# Patient Record
Sex: FEMALE | Race: BLACK OR AFRICAN AMERICAN | Employment: FULL TIME | URBAN - NONMETROPOLITAN AREA
[De-identification: names, ages, dates, MRNs, and addresses within clinical notes are randomized per-mention and may not be internally consistent; named-entity substitution may affect disease eponyms.]

---

## 2017-06-23 ENCOUNTER — OFFICE VISIT (OUTPATIENT)
Dept: OBGYN | Age: 22
End: 2017-06-23
Payer: MEDICAID

## 2017-06-23 VITALS
TEMPERATURE: 98.6 F | WEIGHT: 246 LBS | DIASTOLIC BLOOD PRESSURE: 84 MMHG | HEART RATE: 98 BPM | SYSTOLIC BLOOD PRESSURE: 132 MMHG | HEIGHT: 65 IN | BODY MASS INDEX: 40.98 KG/M2

## 2017-06-23 DIAGNOSIS — Z12.4 SCREENING FOR CERVICAL CANCER: ICD-10-CM

## 2017-06-23 DIAGNOSIS — Z01.419 WOMEN'S ANNUAL ROUTINE GYNECOLOGICAL EXAMINATION: Primary | ICD-10-CM

## 2017-06-23 DIAGNOSIS — Z76.89 ESTABLISHING CARE WITH NEW DOCTOR, ENCOUNTER FOR: ICD-10-CM

## 2017-06-23 DIAGNOSIS — E03.8 OTHER SPECIFIED HYPOTHYROIDISM: ICD-10-CM

## 2017-06-23 DIAGNOSIS — D35.2 PITUITARY MICROADENOMA (HCC): ICD-10-CM

## 2017-06-23 PROCEDURE — 99395 PREV VISIT EST AGE 18-39: CPT | Performed by: OBSTETRICS & GYNECOLOGY

## 2017-06-23 ASSESSMENT — ENCOUNTER SYMPTOMS
ALLERGIC/IMMUNOLOGIC NEGATIVE: 1
RESPIRATORY NEGATIVE: 1
EYES NEGATIVE: 1
GASTROINTESTINAL NEGATIVE: 1

## 2017-08-26 ENCOUNTER — HOSPITAL ENCOUNTER (EMERGENCY)
Age: 22
Discharge: HOME | End: 2017-08-27
Payer: MEDICAID

## 2017-08-26 DIAGNOSIS — M79.605 LEG PAIN, ANTERIOR, LEFT: ICD-10-CM

## 2017-08-26 DIAGNOSIS — N39.0 URINARY TRACT INFECTION WITHOUT HEMATURIA, SITE UNSPECIFIED: ICD-10-CM

## 2017-08-26 DIAGNOSIS — Z20.2 EXPOSURE TO STD: Primary | ICD-10-CM

## 2017-08-26 LAB
BACTERIA: ABNORMAL /HPF
BILIRUBIN URINE: NEGATIVE
BLOOD, URINE: ABNORMAL
CASTS: ABNORMAL /LPF
CLARITY: ABNORMAL
COLOR: YELLOW
EPITHELIAL CELLS, UA: ABNORMAL /HPF
GLUCOSE URINE: NEGATIVE MG/DL
HCG(URINE) PREGNANCY TEST: NEGATIVE
KETONES, URINE: ABNORMAL MG/DL
LEUKOCYTE ESTERASE, URINE: ABNORMAL
NITRITE, URINE: NEGATIVE
PH UA: 6
PROTEIN UA: ABNORMAL MG/DL
RBC UA: ABNORMAL /HPF (ref 0–2)
SPECIFIC GRAVITY UA: 1.03
UROBILINOGEN, URINE: 1 E.U./DL
WBC UA: ABNORMAL /HPF (ref 0–5)

## 2017-08-26 PROCEDURE — 99283 EMERGENCY DEPT VISIT LOW MDM: CPT

## 2017-08-26 PROCEDURE — 6360000002 HC RX W HCPCS: Performed by: NURSE PRACTITIONER

## 2017-08-26 PROCEDURE — 96372 THER/PROPH/DIAG INJ SC/IM: CPT

## 2017-08-26 PROCEDURE — 86403 PARTICLE AGGLUT ANTBDY SCRN: CPT

## 2017-08-26 PROCEDURE — 6370000000 HC RX 637 (ALT 250 FOR IP): Performed by: NURSE PRACTITIONER

## 2017-08-26 PROCEDURE — 87591 N.GONORRHOEAE DNA AMP PROB: CPT

## 2017-08-26 PROCEDURE — 87491 CHLMYD TRACH DNA AMP PROBE: CPT

## 2017-08-26 PROCEDURE — 81025 URINE PREGNANCY TEST: CPT

## 2017-08-26 PROCEDURE — 81001 URINALYSIS AUTO W/SCOPE: CPT

## 2017-08-26 PROCEDURE — 87086 URINE CULTURE/COLONY COUNT: CPT

## 2017-08-26 PROCEDURE — 87185 SC STD ENZYME DETCJ PER NZM: CPT

## 2017-08-26 RX ORDER — CEFTRIAXONE 1 G/1
500 INJECTION, POWDER, FOR SOLUTION INTRAMUSCULAR; INTRAVENOUS ONCE
Status: COMPLETED | OUTPATIENT
Start: 2017-08-26 | End: 2017-08-26

## 2017-08-26 RX ORDER — NAPROXEN 500 MG/1
500 TABLET ORAL 2 TIMES DAILY
Qty: 60 TABLET | Refills: 0 | Status: SHIPPED | OUTPATIENT
Start: 2017-08-26 | End: 2018-02-05

## 2017-08-26 RX ORDER — METRONIDAZOLE 500 MG/1
2000 TABLET ORAL ONCE
Status: COMPLETED | OUTPATIENT
Start: 2017-08-26 | End: 2017-08-26

## 2017-08-26 RX ORDER — AZITHROMYCIN 250 MG/1
1000 TABLET, FILM COATED ORAL ONCE
Status: COMPLETED | OUTPATIENT
Start: 2017-08-26 | End: 2017-08-26

## 2017-08-26 RX ADMIN — METRONIDAZOLE 2000 MG: 500 TABLET ORAL at 23:31

## 2017-08-26 RX ADMIN — AZITHROMYCIN 1000 MG: 250 TABLET, FILM COATED ORAL at 23:31

## 2017-08-26 RX ADMIN — CEFTRIAXONE 500 MG: 1 INJECTION, POWDER, FOR SOLUTION INTRAMUSCULAR; INTRAVENOUS at 23:31

## 2017-08-27 VITALS
TEMPERATURE: 98.5 F | SYSTOLIC BLOOD PRESSURE: 125 MMHG | HEIGHT: 64 IN | HEART RATE: 84 BPM | DIASTOLIC BLOOD PRESSURE: 77 MMHG | WEIGHT: 250 LBS | RESPIRATION RATE: 17 BRPM | OXYGEN SATURATION: 99 % | BODY MASS INDEX: 42.68 KG/M2

## 2017-08-27 PROCEDURE — 99283 EMERGENCY DEPT VISIT LOW MDM: CPT | Performed by: NURSE PRACTITIONER

## 2017-08-27 RX ORDER — SULFAMETHOXAZOLE AND TRIMETHOPRIM 800; 160 MG/1; MG/1
1 TABLET ORAL 2 TIMES DAILY
Qty: 10 TABLET | Refills: 0 | Status: SHIPPED | OUTPATIENT
Start: 2017-08-27 | End: 2017-09-01

## 2017-08-27 ASSESSMENT — ENCOUNTER SYMPTOMS
NAUSEA: 0
ABDOMINAL PAIN: 0
VOMITING: 0

## 2017-08-29 LAB
APTIMA MEDIA TYPE: NORMAL
CHLAMYDIA TRACHOMATIS AMPLIFIED DET: NEGATIVE
N GONORRHOEAE AMPLIFIED DET: NEGATIVE
ORGANISM: ABNORMAL
SPECIMEN SOURCE: NORMAL
URINE CULTURE, ROUTINE: ABNORMAL
URINE CULTURE, ROUTINE: ABNORMAL

## 2017-09-06 ENCOUNTER — TELEPHONE (OUTPATIENT)
Dept: OBGYN | Age: 22
End: 2017-09-06

## 2018-02-05 ENCOUNTER — OFFICE VISIT (OUTPATIENT)
Dept: OBGYN | Age: 23
End: 2018-02-05
Payer: MEDICAID

## 2018-02-05 VITALS
WEIGHT: 206 LBS | HEIGHT: 63 IN | BODY MASS INDEX: 36.5 KG/M2 | DIASTOLIC BLOOD PRESSURE: 84 MMHG | SYSTOLIC BLOOD PRESSURE: 130 MMHG

## 2018-02-05 DIAGNOSIS — N91.2 AMENORRHEA: ICD-10-CM

## 2018-02-05 DIAGNOSIS — Z32.01 POSITIVE URINE PREGNANCY TEST: Primary | ICD-10-CM

## 2018-02-05 LAB
CONTROL: ABNORMAL
PREGNANCY TEST URINE, POC: ABNORMAL

## 2018-02-05 PROCEDURE — 81025 URINE PREGNANCY TEST: CPT | Performed by: OBSTETRICS & GYNECOLOGY

## 2018-02-05 PROCEDURE — 99213 OFFICE O/P EST LOW 20 MIN: CPT | Performed by: OBSTETRICS & GYNECOLOGY

## 2018-02-05 ASSESSMENT — ENCOUNTER SYMPTOMS
ALLERGIC/IMMUNOLOGIC NEGATIVE: 1
GASTROINTESTINAL NEGATIVE: 1
EYES NEGATIVE: 1
RESPIRATORY NEGATIVE: 1

## 2018-02-05 NOTE — PROGRESS NOTES
Subjective:      Patient ID: Kimo Thorne is a 23 y.o. female. Kimo Thorne is here today for confirmation of pregnancy. She had a positive home pregnancy test.  She is A0. Her LMP was 18. According to her LMP, she is 5 W 0 D. HPI   Pt here for pregnancy confirmation. She has no other complaints. Previously on Bromocriptine for pituitary microadenoma. She self d/c'd after having menstrual regulation. Review of Systems   Constitutional: Negative. HENT: Negative. Eyes: Negative. Respiratory: Negative. Cardiovascular: Negative. Gastrointestinal: Negative. Endocrine: Negative. Genitourinary: Positive for menstrual problem (amenorrhea due to pregnancy). Musculoskeletal: Negative. Skin: Negative. Allergic/Immunologic: Negative. Neurological: Negative. Hematological: Negative. Psychiatric/Behavioral: Negative. Kimo Thorne is a 23 y.o. female with the following history as recorded in [facility]: There are no active problems to display for this patient. No current outpatient prescriptions on file. No current facility-administered medications for this visit. Allergies: Review of patient's allergies indicates no known allergies. Past Medical History:   Diagnosis Date    Pituitary microadenoma [facility])     Pituitary tumor      History reviewed. No pertinent surgical history. Family History   Problem Relation Age of Onset    Hypertension Mother     Cancer Maternal Grandmother      cervical     Social History   Substance Use Topics    Smoking status: Former Smoker    Smokeless tobacco: Never Used    Alcohol use No         /84   Ht 5' 3\" (1.6 m)   Wt 206 lb (93.4 kg)   LMP 2018 (Exact Date)   BMI 36.49 kg/m²   Objective:   Physical Exam   Constitutional: She is oriented to person, place, and time. She appears well-developed and well-nourished. No distress.    HENT:   Head: Normocephalic and

## 2018-02-10 ENCOUNTER — HOSPITAL ENCOUNTER (EMERGENCY)
Facility: HOSPITAL | Age: 23
Discharge: HOME | End: 2018-02-10
Attending: EMERGENCY MEDICINE | Admitting: EMERGENCY MEDICINE

## 2018-02-10 ENCOUNTER — APPOINTMENT (OUTPATIENT)
Dept: ULTRASOUND IMAGING | Facility: HOSPITAL | Age: 23
End: 2018-02-10

## 2018-02-10 VITALS
HEART RATE: 79 BPM | DIASTOLIC BLOOD PRESSURE: 74 MMHG | WEIGHT: 179 LBS | BODY MASS INDEX: 31.71 KG/M2 | OXYGEN SATURATION: 98 % | SYSTOLIC BLOOD PRESSURE: 122 MMHG | HEIGHT: 63 IN | RESPIRATION RATE: 14 BRPM | TEMPERATURE: 98.8 F

## 2018-02-10 DIAGNOSIS — R10.9 ABDOMINAL PAIN DURING PREGNANCY IN FIRST TRIMESTER: Primary | ICD-10-CM

## 2018-02-10 DIAGNOSIS — O26.891 ABDOMINAL PAIN DURING PREGNANCY IN FIRST TRIMESTER: Primary | ICD-10-CM

## 2018-02-10 LAB
ALBUMIN SERPL-MCNC: 4.1 G/DL (ref 3.5–5)
ALBUMIN/GLOB SERPL: 1.3 G/DL (ref 1.1–2.5)
ALP SERPL-CCNC: 69 U/L (ref 24–120)
ALT SERPL W P-5'-P-CCNC: 39 U/L (ref 0–54)
ANION GAP SERPL CALCULATED.3IONS-SCNC: 12 MMOL/L (ref 4–13)
AST SERPL-CCNC: 30 U/L (ref 7–45)
B-HCG UR QL: POSITIVE
BASOPHILS # BLD AUTO: 0.06 10*3/MM3 (ref 0–0.2)
BASOPHILS NFR BLD AUTO: 0.5 % (ref 0–2)
BILIRUB SERPL-MCNC: 0.4 MG/DL (ref 0.1–1)
BILIRUB UR QL STRIP: NEGATIVE
BUN BLD-MCNC: 15 MG/DL (ref 5–21)
BUN/CREAT SERPL: 16.5 (ref 7–25)
CALCIUM SPEC-SCNC: 9.1 MG/DL (ref 8.4–10.4)
CHLORIDE SERPL-SCNC: 105 MMOL/L (ref 98–110)
CLARITY UR: CLEAR
CO2 SERPL-SCNC: 24 MMOL/L (ref 24–31)
COLOR UR: YELLOW
CREAT BLD-MCNC: 0.91 MG/DL (ref 0.5–1.4)
DEPRECATED RDW RBC AUTO: 41.8 FL (ref 40–54)
EOSINOPHIL # BLD AUTO: 0.09 10*3/MM3 (ref 0–0.7)
EOSINOPHIL NFR BLD AUTO: 0.8 % (ref 0–4)
ERYTHROCYTE [DISTWIDTH] IN BLOOD BY AUTOMATED COUNT: 12.5 % (ref 12–15)
GFR SERPL CREATININE-BSD FRML MDRD: 92 ML/MIN/1.73
GLOBULIN UR ELPH-MCNC: 3.2 GM/DL
GLUCOSE BLD-MCNC: 74 MG/DL (ref 70–100)
GLUCOSE UR STRIP-MCNC: NEGATIVE MG/DL
HCG INTACT+B SERPL-ACNC: 8854.6 MIU/ML (ref 0–5)
HCT VFR BLD AUTO: 36.1 % (ref 37–47)
HGB BLD-MCNC: 12.3 G/DL (ref 12–16)
HGB UR QL STRIP.AUTO: NEGATIVE
HOLD SPECIMEN: NORMAL
HOLD SPECIMEN: NORMAL
IMM GRANULOCYTES # BLD: 0.09 10*3/MM3 (ref 0–0.03)
IMM GRANULOCYTES NFR BLD: 0.8 % (ref 0–5)
KETONES UR QL STRIP: NEGATIVE
LEUKOCYTE ESTERASE UR QL STRIP.AUTO: NEGATIVE
LIPASE SERPL-CCNC: 70 U/L (ref 23–203)
LYMPHOCYTES # BLD AUTO: 3.73 10*3/MM3 (ref 0.72–4.86)
LYMPHOCYTES NFR BLD AUTO: 31.7 % (ref 15–45)
MCH RBC QN AUTO: 30.7 PG (ref 28–32)
MCHC RBC AUTO-ENTMCNC: 34.1 G/DL (ref 33–36)
MCV RBC AUTO: 90 FL (ref 82–98)
MONOCYTES # BLD AUTO: 0.9 10*3/MM3 (ref 0.19–1.3)
MONOCYTES NFR BLD AUTO: 7.7 % (ref 4–12)
NEUTROPHILS # BLD AUTO: 6.89 10*3/MM3 (ref 1.87–8.4)
NEUTROPHILS NFR BLD AUTO: 58.5 % (ref 39–78)
NITRITE UR QL STRIP: NEGATIVE
NRBC BLD MANUAL-RTO: 0 /100 WBC (ref 0–0)
PH UR STRIP.AUTO: <=5 [PH] (ref 5–8)
PLATELET # BLD AUTO: 284 10*3/MM3 (ref 130–400)
PMV BLD AUTO: 10.3 FL (ref 6–12)
POTASSIUM BLD-SCNC: 3.6 MMOL/L (ref 3.5–5.3)
PROT SERPL-MCNC: 7.3 G/DL (ref 6.3–8.7)
PROT UR QL STRIP: NEGATIVE
RBC # BLD AUTO: 4.01 10*6/MM3 (ref 4.2–5.4)
SODIUM BLD-SCNC: 141 MMOL/L (ref 135–145)
SP GR UR STRIP: 1.03 (ref 1–1.03)
UROBILINOGEN UR QL STRIP: NORMAL
WBC NRBC COR # BLD: 11.76 10*3/MM3 (ref 4.8–10.8)
WHOLE BLOOD HOLD SPECIMEN: NORMAL
WHOLE BLOOD HOLD SPECIMEN: NORMAL

## 2018-02-10 PROCEDURE — 83690 ASSAY OF LIPASE: CPT | Performed by: EMERGENCY MEDICINE

## 2018-02-10 PROCEDURE — 76817 TRANSVAGINAL US OBSTETRIC: CPT

## 2018-02-10 PROCEDURE — 81003 URINALYSIS AUTO W/O SCOPE: CPT | Performed by: EMERGENCY MEDICINE

## 2018-02-10 PROCEDURE — 81025 URINE PREGNANCY TEST: CPT | Performed by: EMERGENCY MEDICINE

## 2018-02-10 PROCEDURE — 99283 EMERGENCY DEPT VISIT LOW MDM: CPT

## 2018-02-10 PROCEDURE — 84702 CHORIONIC GONADOTROPIN TEST: CPT | Performed by: EMERGENCY MEDICINE

## 2018-02-10 PROCEDURE — 80053 COMPREHEN METABOLIC PANEL: CPT | Performed by: EMERGENCY MEDICINE

## 2018-02-10 PROCEDURE — 85025 COMPLETE CBC W/AUTO DIFF WBC: CPT | Performed by: EMERGENCY MEDICINE

## 2018-02-10 RX ORDER — PRENATAL VIT NO.126/IRON/FOLIC 28MG-0.8MG
TABLET ORAL DAILY
COMMUNITY
End: 2021-11-16

## 2018-02-10 NOTE — ED PROVIDER NOTES
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 Denies vaginal bleeding, discharge, falls, trauma, urinary issues, fever, chills, flank or back pain, cp, sob, nausea, vomiting, diarrhea, medication changes or other issues. She arrives in NAD.     Patient is a 22 y.o. female presenting with abdominal pain.   Abdominal Pain   Pain location:  Suprapubic  Pain quality: cramping    Pain radiates to:  Does not radiate  Pain severity:  Mild  Timing:  Intermittent  Relieved by:  Nothing  Worsened by:  Nothing  Ineffective treatments:  None tried  Associated symptoms: no cough, no dysuria, no hematuria, no nausea, no shortness of breath, no vaginal bleeding, no vaginal discharge and no vomiting        Review of Systems   Respiratory: Negative for cough and shortness of breath.    Gastrointestinal: Positive for abdominal pain. Negative for nausea and vomiting.   Genitourinary: Positive for pelvic pain. Negative for dysuria, flank pain, hematuria, urgency, vaginal bleeding and vaginal discharge.   Musculoskeletal: Negative for back pain.   Skin: Negative for rash.   All other systems reviewed and are negative.      Past Medical History:   Diagnosis Date   • Amenorrhea    • Benign neoplasm of pituitary gland    • Central hypothyroidism    • Galactorrhea not associated with childbirth     Galactorrhea due to non-obstetric cause      • Hyperprolactinemia    • Prolactinoma        No Known Allergies    History reviewed. No pertinent surgical history.    History reviewed. No pertinent family history.    Social History     Social History   • Marital status: Single     Spouse name: N/A   • Number of children: N/A   • Years of education: N/A     Social History Main Topics   • Smoking status: Never Smoker   • Smokeless tobacco: None   • Alcohol use No    • Drug use: No   • Sexual activity: Defer     Other Topics Concern   • None     Social History Narrative   • None           Objective   Physical Exam   Constitutional: She is oriented to person, place, and time. She appears well-developed.   HENT:   Head: Normocephalic.   Nose: Nose normal.   Eyes: Conjunctivae are normal. Pupils are equal, round, and reactive to light.   Neck: Normal range of motion. Neck supple.   Cardiovascular: Normal rate, regular rhythm, normal heart sounds and intact distal pulses.    Pulmonary/Chest: Effort normal and breath sounds normal.   Abdominal: Soft. Bowel sounds are normal.   Soft, non tender   Musculoskeletal: Normal range of motion.   Neurological: She is alert and oriented to person, place, and time.   Skin: Skin is warm.   Psychiatric: She has a normal mood and affect.   Vitals reviewed.      Procedures         ED Course  ED Course        US Ob Transvaginal    (Results Pending)     Labs Reviewed   PREGNANCY, URINE - Abnormal; Notable for the following:        Result Value    HCG, Urine QL Positive (*)     All other components within normal limits   HCG, QUANTITATIVE, PREGNANCY - Abnormal; Notable for the following:     HCG Quantitative 8854.60 (*)     All other components within normal limits   CBC WITH AUTO DIFFERENTIAL - Abnormal; Notable for the following:     WBC 11.76 (*)     RBC 4.01 (*)     Hematocrit 36.1 (*)     Immature Grans, Absolute 0.09 (*)     All other components within normal limits   LIPASE - Normal   URINALYSIS W/ CULTURE IF INDICATED - Normal    Narrative:     Urine microscopic not indicated.   COMPREHENSIVE METABOLIC PANEL   RAINBOW DRAW    Narrative:     The following orders were created for panel order [place] Draw.  Procedure                               Abnormality         Status                     ---------                               -----------         ------                     Light Blue Top[[id number]]                                   Final  "result               Green Top (Gel)[[id number]]                                  Final result               Lavender Top[[id number]]                                     Final result               Red Top[[id number]]                                          Final result                 Please view results for these tests on the individual orders.   CBC AND DIFFERENTIAL    Narrative:     The following orders were created for panel order CBC & Differential.  Procedure                               Abnormality         Status                     ---------                               -----------         ------                     CBC Auto Differential[[id number]]        Abnormal            Final result                 Please view results for these tests on the individual orders.   LIGHT BLUE TOP   GREEN TOP   LAVENDER TOP   RED TOP         Patient refuses pelvic at this time. She shares with me that she has been working \"a lot\" and admits to decreased po intake secondary to working \"so much.\" I explained that on her US we did not yet see all the parts of her developing fetus and that it is important that she actually follow up to have further evaluation to assure her child was developing normally. She has no pain at this time stating \"it got better when I rested.\" At this time, labs unrevealing, patient refuses pelvic but denies bleeding, asking for dc.       US: shows Intrauterine gest. Sac, yolk sac but no fetal pole as of yet.     First bp is seen, will have repeat, if still high will call OB.         MDM    Final diagnoses:   Abdominal pain during pregnancy in first trimester            Ramses Haile MD  02/10/18 0510    "

## 2018-02-10 NOTE — DISCHARGE INSTRUCTIONS
Brenshea,    Your ultrasound, as we talked about, shows some of the normal aspects of your developing child, however, not all are seen as of yet. This can be because you are too early along in your pregnancy to see these structures. Given this, it is important that you 1) see your OB for further evaluation and repeat ultrasounds, 2) return for worsening symptoms, worsening pain, vaginal bleeding or any other issues and 3) increase your fluid hydration at home until your urine is clear to pale yellow and you are urinating every 3-4 hours.

## 2018-02-13 LAB
EXTERNAL ABO GROUPING: NORMAL
EXTERNAL ANTIBODY SCREEN: NEGATIVE
EXTERNAL GROUP B STREP ANTIGEN: POSITIVE
EXTERNAL HEPATITIS B SURFACE ANTIGEN: NEGATIVE
EXTERNAL HEPATITIS C AB: 0.2
EXTERNAL HERPES CULTURE: NORMAL
EXTERNAL RH FACTOR: POSITIVE
EXTERNAL RUBELLA QUALITATIVE: NORMAL
EXTERNAL SYPHILIS RPR SCREEN: NEGATIVE

## 2018-03-27 ENCOUNTER — HOSPITAL ENCOUNTER (EMERGENCY)
Facility: HOSPITAL | Age: 23
Discharge: HOME | End: 2018-03-27
Admitting: EMERGENCY MEDICINE

## 2018-03-27 VITALS
TEMPERATURE: 98.5 F | DIASTOLIC BLOOD PRESSURE: 76 MMHG | HEIGHT: 64 IN | HEART RATE: 94 BPM | SYSTOLIC BLOOD PRESSURE: 125 MMHG | WEIGHT: 215 LBS | BODY MASS INDEX: 36.7 KG/M2 | OXYGEN SATURATION: 100 % | RESPIRATION RATE: 16 BRPM

## 2018-03-27 DIAGNOSIS — R10.9 ABDOMINAL PAIN DURING PREGNANCY IN FIRST TRIMESTER: ICD-10-CM

## 2018-03-27 DIAGNOSIS — N30.90 CYSTITIS: Primary | ICD-10-CM

## 2018-03-27 DIAGNOSIS — O26.891 ABDOMINAL PAIN DURING PREGNANCY IN FIRST TRIMESTER: ICD-10-CM

## 2018-03-27 DIAGNOSIS — Z20.2 POTENTIAL EXPOSURE TO STD: ICD-10-CM

## 2018-03-27 LAB
ALBUMIN SERPL-MCNC: 4 G/DL (ref 3.5–5)
ALBUMIN/GLOB SERPL: 1.2 G/DL (ref 1.1–2.5)
ALP SERPL-CCNC: 43 U/L (ref 24–120)
ALT SERPL W P-5'-P-CCNC: 24 U/L (ref 0–54)
ANION GAP SERPL CALCULATED.3IONS-SCNC: 12 MMOL/L (ref 4–13)
AST SERPL-CCNC: 20 U/L (ref 7–45)
BACTERIA UR QL AUTO: ABNORMAL /HPF
BASOPHILS # BLD AUTO: 0.05 10*3/MM3 (ref 0–0.2)
BASOPHILS NFR BLD AUTO: 0.3 % (ref 0–2)
BILIRUB SERPL-MCNC: 0.4 MG/DL (ref 0.1–1)
BILIRUB UR QL STRIP: NEGATIVE
BUN BLD-MCNC: 11 MG/DL (ref 5–21)
BUN/CREAT SERPL: 16.2 (ref 7–25)
CALCIUM SPEC-SCNC: 9.8 MG/DL (ref 8.4–10.4)
CHLORIDE SERPL-SCNC: 105 MMOL/L (ref 98–110)
CLARITY UR: ABNORMAL
CLUE CELLS SPEC QL WET PREP: ABNORMAL
CO2 SERPL-SCNC: 24 MMOL/L (ref 24–31)
COLOR UR: YELLOW
CREAT BLD-MCNC: 0.68 MG/DL (ref 0.5–1.4)
DEPRECATED RDW RBC AUTO: 39.8 FL (ref 40–54)
EOSINOPHIL # BLD AUTO: 0.02 10*3/MM3 (ref 0–0.7)
EOSINOPHIL NFR BLD AUTO: 0.1 % (ref 0–4)
ERYTHROCYTE [DISTWIDTH] IN BLOOD BY AUTOMATED COUNT: 12 % (ref 12–15)
GFR SERPL CREATININE-BSD FRML MDRD: 129 ML/MIN/1.73
GLOBULIN UR ELPH-MCNC: 3.3 GM/DL
GLUCOSE BLD-MCNC: 89 MG/DL (ref 70–100)
GLUCOSE UR STRIP-MCNC: NEGATIVE MG/DL
HCT VFR BLD AUTO: 37.2 % (ref 37–47)
HGB BLD-MCNC: 12.5 G/DL (ref 12–16)
HGB UR QL STRIP.AUTO: NEGATIVE
HYALINE CASTS UR QL AUTO: ABNORMAL /LPF
HYDATID CYST SPEC WET PREP: ABNORMAL
IMM GRANULOCYTES # BLD: 0.08 10*3/MM3 (ref 0–0.03)
IMM GRANULOCYTES NFR BLD: 0.5 % (ref 0–5)
KETONES UR QL STRIP: ABNORMAL
LEUKOCYTE ESTERASE UR QL STRIP.AUTO: ABNORMAL
LIPASE SERPL-CCNC: 39 U/L (ref 23–203)
LYMPHOCYTES # BLD AUTO: 3.48 10*3/MM3 (ref 0.72–4.86)
LYMPHOCYTES NFR BLD AUTO: 23.5 % (ref 15–45)
MCH RBC QN AUTO: 30.5 PG (ref 28–32)
MCHC RBC AUTO-ENTMCNC: 33.6 G/DL (ref 33–36)
MCV RBC AUTO: 90.7 FL (ref 82–98)
MONOCYTES # BLD AUTO: 0.86 10*3/MM3 (ref 0.19–1.3)
MONOCYTES NFR BLD AUTO: 5.8 % (ref 4–12)
NEUTROPHILS # BLD AUTO: 10.34 10*3/MM3 (ref 1.87–8.4)
NEUTROPHILS NFR BLD AUTO: 69.8 % (ref 39–78)
NITRITE UR QL STRIP: NEGATIVE
NRBC BLD MANUAL-RTO: 0 /100 WBC (ref 0–0)
PH UR STRIP.AUTO: 5.5 [PH] (ref 5–8)
PLATELET # BLD AUTO: 277 10*3/MM3 (ref 130–400)
PMV BLD AUTO: 10 FL (ref 6–12)
POTASSIUM BLD-SCNC: 3.9 MMOL/L (ref 3.5–5.3)
PROT SERPL-MCNC: 7.3 G/DL (ref 6.3–8.7)
PROT UR QL STRIP: ABNORMAL
RBC # BLD AUTO: 4.1 10*6/MM3 (ref 4.2–5.4)
RBC # UR: ABNORMAL /HPF
REF LAB TEST METHOD: ABNORMAL
SODIUM BLD-SCNC: 141 MMOL/L (ref 135–145)
SP GR UR STRIP: >=1.03 (ref 1–1.03)
SQUAMOUS #/AREA URNS HPF: ABNORMAL /HPF
T VAGINALIS SPEC QL WET PREP: ABNORMAL
UROBILINOGEN UR QL STRIP: ABNORMAL
WBC NRBC COR # BLD: 14.83 10*3/MM3 (ref 4.8–10.8)
WBC SPEC QL WET PREP: ABNORMAL
WBC UR QL AUTO: ABNORMAL /HPF
YEAST GENITAL QL WET PREP: ABNORMAL

## 2018-03-27 PROCEDURE — 87081 CULTURE SCREEN ONLY: CPT | Performed by: NURSE PRACTITIONER

## 2018-03-27 PROCEDURE — 25010000002 CEFTRIAXONE PER 250 MG: Performed by: NURSE PRACTITIONER

## 2018-03-27 PROCEDURE — 87210 SMEAR WET MOUNT SALINE/INK: CPT | Performed by: NURSE PRACTITIONER

## 2018-03-27 PROCEDURE — 80053 COMPREHEN METABOLIC PANEL: CPT | Performed by: NURSE PRACTITIONER

## 2018-03-27 PROCEDURE — 87591 N.GONORRHOEAE DNA AMP PROB: CPT | Performed by: NURSE PRACTITIONER

## 2018-03-27 PROCEDURE — 85025 COMPLETE CBC W/AUTO DIFF WBC: CPT | Performed by: NURSE PRACTITIONER

## 2018-03-27 PROCEDURE — 87491 CHLMYD TRACH DNA AMP PROBE: CPT | Performed by: NURSE PRACTITIONER

## 2018-03-27 PROCEDURE — 87086 URINE CULTURE/COLONY COUNT: CPT | Performed by: NURSE PRACTITIONER

## 2018-03-27 PROCEDURE — 83690 ASSAY OF LIPASE: CPT | Performed by: NURSE PRACTITIONER

## 2018-03-27 PROCEDURE — 99284 EMERGENCY DEPT VISIT MOD MDM: CPT

## 2018-03-27 PROCEDURE — 81001 URINALYSIS AUTO W/SCOPE: CPT | Performed by: NURSE PRACTITIONER

## 2018-03-27 PROCEDURE — 96372 THER/PROPH/DIAG INJ SC/IM: CPT

## 2018-03-27 RX ORDER — AZITHROMYCIN 250 MG/1
1000 TABLET, FILM COATED ORAL ONCE
Status: COMPLETED | OUTPATIENT
Start: 2018-03-27 | End: 2018-03-27

## 2018-03-27 RX ORDER — ACETAMINOPHEN 500 MG
1000 TABLET ORAL ONCE
Status: COMPLETED | OUTPATIENT
Start: 2018-03-27 | End: 2018-03-27

## 2018-03-27 RX ORDER — NITROFURANTOIN 25; 75 MG/1; MG/1
100 CAPSULE ORAL 2 TIMES DAILY
Qty: 14 CAPSULE | Refills: 0 | Status: SHIPPED | OUTPATIENT
Start: 2018-03-27 | End: 2018-04-03

## 2018-03-27 RX ADMIN — CEFTRIAXONE SODIUM 250 MG: 500 INJECTION, POWDER, FOR SOLUTION INTRAMUSCULAR; INTRAVENOUS at 18:59

## 2018-03-27 RX ADMIN — AZITHROMYCIN 1000 MG: 250 TABLET, FILM COATED ORAL at 19:06

## 2018-03-27 RX ADMIN — ACETAMINOPHEN 1000 MG: 500 TABLET ORAL at 17:31

## 2018-03-27 NOTE — DISCHARGE INSTRUCTIONS
Increase fluid intake, continue Tylenol as needed for abdominal cramping.  May use heating pad/warm soaks.  You should see some improvement in the next 24-48 hrs.  If you have a worsening present to emergency room as soon as possible.  If there is no improvement after 48 hours he needs to be reevaluated as soon as possible.  You will be contacted by Hospital representative for results of your swabs.  If not you may contact [facility] in the next 2-3 days.  Follow up with your OB provider tomorrow.  Monitor for signs symptoms worsening including, worsening pain, increased discharge or bleeding high fevers, and inability to keep down food or liquid.  You may return to the emergency room for any worsening symptoms.

## 2018-03-27 NOTE — ED NOTES
C/o's 'I'm extremely stressed, I'm cramping, 12 weeks, no vaginal bleeding.' 'Been stressed for over a week, a break up, been together 6 months.' 'Cramping started last night.' ' is my OB, saw her last Thursday, had ultrasound.' Pt denies thoughts of hurting herself or others     Tigist Li RN  03/27/18 4019       Tigist Li RN  03/27/18 5111

## 2018-03-27 NOTE — ED PROVIDER NOTES
Subjective   Mrs Garcia is a 22-year-old female patient who presents today with complaints of an acute onset of left-sided lower abdominal cramping and right-sided lower back pain since last night.  Patient states that the lower left sided cramping is intermittent.  Patient states that the lower right back pain is along her belt line.  Denies any radiating pain down her legs.  There is point tenderness and pain with sitting upright.  Patient denies any accompanying diarrhea, vomiting, fevers/chills, vaginal bleeding, chest pain, shortness of breath.  Patient states that she has been under a lot of stress and is concerned that she has been exposed to STDs.  Patient states that she has had some thick white discharge and occasional odor.  Patient states that she was seen by her OB provider approximately 5 days ago where a single intrauterine pregnancy was confirmed via ultrasound.  Patient states that she has had decreased liquid intake due to stress.  Patient states that she has not had any dysuria, hesitancy, frequency.  Patient is unsure if her sexual partner has had any STD symptoms.  She has not had any medication or treatments for these symptoms prior to presentation.        History provided by:  Patient   used: No        Review of Systems   Constitutional: Negative for chills and fever.   HENT: Negative for congestion, rhinorrhea, sore throat and trouble swallowing.    Eyes: Negative.  Negative for visual disturbance.   Respiratory: Negative.  Negative for cough, chest tightness and shortness of breath.    Cardiovascular: Negative.  Negative for chest pain and palpitations.   Gastrointestinal: Negative for abdominal pain, diarrhea, nausea and vomiting.   Endocrine: Negative.    Genitourinary: Positive for dyspareunia, pelvic pain (lower left) and vaginal discharge. Negative for decreased urine volume, difficulty urinating, dysuria and vaginal bleeding.   Musculoskeletal: Positive for back  "pain (low right back). Negative for neck stiffness.   Skin: Negative.  Negative for wound.   Allergic/Immunologic: Negative.    Neurological: Negative.  Negative for dizziness, weakness and headaches.   Hematological: Negative.    Psychiatric/Behavioral: Negative.        Past Medical History:   Diagnosis Date   • Amenorrhea    • Benign neoplasm of pituitary gland    • Central hypothyroidism    • Galactorrhea not associated with childbirth     Galactorrhea due to non-obstetric cause      • Hyperprolactinemia    • Prolactinoma        No Known Allergies    History reviewed. No pertinent surgical history.    History reviewed. No pertinent family history.    Social History     Social History   • Marital status: Single     Social History Main Topics   • Smoking status: Never Smoker   • Smokeless tobacco: Never Used   • Alcohol use No   • Drug use: No   • Sexual activity: Defer     Other Topics Concern   • Not on file     /76 (BP Location: Right arm, Patient Position: Sitting)   Pulse 94   Temp 98.5 °F (36.9 °C) (Oral)   Resp 16   Ht 162.6 cm (64\")   Wt 97.5 kg (215 lb)   SpO2 100%   BMI 36.90 kg/m²         Objective   Physical Exam   Constitutional: She is oriented to person, place, and time. She appears well-developed and well-nourished. No distress.   HENT:   Head: Normocephalic and atraumatic.   Right Ear: External ear normal.   Left Ear: External ear normal.   Nose: Nose normal.   Mouth/Throat: Oropharynx is clear and moist.   Eyes: EOM are normal. Pupils are equal, round, and reactive to light.   Neck: Normal range of motion. Neck supple.   Cardiovascular: Normal rate and regular rhythm.    Pulmonary/Chest: Effort normal and breath sounds normal.   Abdominal: Soft. Bowel sounds are normal. There is no tenderness. There is no rebound, no guarding and no CVA tenderness.   Genitourinary: Vagina normal. Pelvic exam was performed with patient supine. There is no rash, tenderness, lesion or injury on the right " labia. There is no rash, tenderness, lesion or injury on the left labia. Cervix exhibits discharge (white). Cervix exhibits no motion tenderness. No erythema or tenderness in the vagina. No foreign body in the vagina.   Musculoskeletal: Normal range of motion.        Lumbar back: She exhibits pain and spasm. She exhibits normal range of motion, no bony tenderness, no swelling, no edema, no deformity and no laceration.   Lumbar back examined.  Skin intact no swelling or tenderness or bruising.  There is no spinal tenderness.  There is tenderness over left-sided SI joint.  There is muscle spasm over left SI joint.  There is no CVA tenderness   Neurological: She is alert and oriented to person, place, and time.   Skin: Skin is warm and dry.   Psychiatric: She has a normal mood and affect.   Nursing note and vitals reviewed.      Procedures  Labs Reviewed   WET PREP, GENITAL - Abnormal; Notable for the following:        Result Value    WBC'S 1+ WBC's seen (*)     All other components within normal limits   URINALYSIS W/ CULTURE IF INDICATED - Abnormal; Notable for the following:     Appearance, UA Turbid (*)     Ketones, UA Trace (*)     Protein, UA Trace (*)     Leuk Esterase, UA Small (1+) (*)     All other components within normal limits   URINALYSIS, MICROSCOPIC ONLY - Abnormal; Notable for the following:     RBC, UA 0-2 (*)     WBC, UA Too Numerous to Count (*)     Bacteria, UA 3+ (*)     Squamous Epithelial Cells, UA Too Numerous to Count (*)     All other components within normal limits   CBC WITH AUTO DIFFERENTIAL - Abnormal; Notable for the following:     WBC 14.83 (*)     RBC 4.10 (*)     RDW-SD 39.8 (*)     Neutrophils, Absolute 10.34 (*)     Immature Grans, Absolute 0.08 (*)     All other components within normal limits   LIPASE - Normal   GONOCOCCUS CULTURE   CHLAMYDIA TRACHOMATIS, NEISSERIA GONORRHOEAE, PCR   URINE CULTURE   COMPREHENSIVE METABOLIC PANEL   CBC AND DIFFERENTIAL    Narrative:     The  following orders were created for panel order CBC & Differential.  Procedure                               Abnormality         Status                     ---------                               -----------         ------                     CBC Auto Differential[[id number]]        Abnormal            Final result                 Please view results for these tests on the individual orders.     Medications   acetaminophen (TYLENOL) tablet 1,000 mg (1,000 mg Oral Given 3/27/18 1731)   cefTRIAXone (ROCEPHIN) 250 mg in lidocaine (XYLOCAINE) 1 % IM only syringe (250 mg Intramuscular Given 3/27/18 1859)   azithromycin (ZITHROMAX) tablet 1,000 mg (1,000 mg Oral Given 3/27/18 1906)            ED Course  ED Course   Comment By Time   Pelvic exam done with nurse chaperone Ruma Garcia, RYAN 03/27 1700   Discussed patient history and presentation lab workup with Dr. Rothman.  In agreement to prophylactically treat for STD infection, treated for cystitis and have patient follow up with primary care provider. Ruma Garcia, RYAN 03/27 1815                  [facility]    Final diagnoses:   Cystitis   Abdominal pain during pregnancy in first trimester   Potential exposure to STD   Patient states that her symptoms have somewhat improved with administration of Tylenol.  We have treated her prophylactic daily for STD exposure and we have given prescription for cystitis.  Patient is to follow up with primary care provider/OB provider tomorrow.  Patient to monitor for signs symptoms of worsening and return to emergency room if needed.     RYAN Small  03/27/18 2024

## 2018-03-29 LAB
BACTERIA SPEC AEROBE CULT: ABNORMAL
BACTERIA SPEC AEROBE CULT: ABNORMAL
C TRACH RRNA SPEC DONR QL NAA+PROBE: NEGATIVE
N GONORRHOEA DNA SPEC QL NAA+PROBE: NEGATIVE

## 2018-03-30 LAB — BACTERIA SPEC AEROBE CULT: NORMAL

## 2018-09-05 ENCOUNTER — NURSE TRIAGE (OUTPATIENT)
Dept: CALL CENTER | Facility: HOSPITAL | Age: 23
End: 2018-09-05

## 2018-09-06 ENCOUNTER — HOSPITAL ENCOUNTER (OUTPATIENT)
Facility: HOSPITAL | Age: 23
Discharge: HOME | End: 2018-09-06
Attending: OBSTETRICS & GYNECOLOGY | Admitting: OBSTETRICS & GYNECOLOGY

## 2018-09-06 VITALS
OXYGEN SATURATION: 99 % | HEART RATE: 86 BPM | SYSTOLIC BLOOD PRESSURE: 128 MMHG | TEMPERATURE: 97.7 F | WEIGHT: 224 LBS | DIASTOLIC BLOOD PRESSURE: 77 MMHG | RESPIRATION RATE: 18 BRPM | BODY MASS INDEX: 38.24 KG/M2 | HEIGHT: 64 IN

## 2018-09-06 PROBLEM — Z34.90 PREGNANCY: Status: ACTIVE | Noted: 2018-09-06

## 2018-09-06 PROCEDURE — G0378 HOSPITAL OBSERVATION PER HR: HCPCS

## 2018-09-06 PROCEDURE — G0463 HOSPITAL OUTPT CLINIC VISIT: HCPCS

## 2018-09-06 NOTE — TELEPHONE ENCOUNTER
"    Reason for Disposition  • Mild abdominal pain    Additional Information  • Negative: Passed out (i.e., lost consciousness, collapsed and was not responding)  • Negative: Shock suspected (e.g., cold/pale/clammy skin, too weak to stand, low BP, rapid pulse)  • Negative: Difficult to awaken or acting confused (e.g., disoriented, slurred speech)  • Negative: [1] SEVERE abdominal pain (e.g., excruciating) AND [2] constant AND [3] present > 1 hour  • Negative: SEVERE vaginal bleeding (e.g., continuous red blood from vagina, or large blood clots)  • Negative: Sounds like a life-threatening emergency to the triager  • Negative: Followed an abdomen (stomach) injury  • Negative: [1] Having contractions or other symptoms of labor AND [2] >= 37 weeks pregnant (i.e., term pregnancy)  • Negative: [1] Having contractions or other symptoms of labor AND [2] < 37 weeks pregnant (i.e., )  • Negative: [1] Abdominal pain AND [2] pregnant < 20 weeks  • Negative: [1] Vomiting AND [2] contains red blood or black (\"coffee ground\") material  (Exception: few red streaks in vomit that only happened once)  • Negative: MODERATE-SEVERE abdominal pain (e.g., interferes with normal activities, awakens from sleep)  • Negative: Vaginal bleeding or spotting  • Negative: [1] Baby moving less today (e.g., kick count < 5 in 1 hour or < 10 in 2 hours) AND [2] pregnant 23 or more weeks  • Negative: Leakage of fluid from vagina  • Negative: New hand or face swelling  • Negative: New blurred vision or vision changes  • Negative: [1] SEVERE headache AND [2] not relieved with acetaminophen (e.g., Tylenol)  • Negative: [1] MILD abdominal pain (e.g., doesn't interfere with normal activities) AND [2] constant AND [3] present > 2 hours  • Negative: [1] Intermittent lower abdominal pain AND [2] present > 24 hours  • Negative: Fever > 100.4 F (38.0 C)  • Negative: Blood in urine (red, pink, or tea-colored)  • Negative: White of the eyes have turned yellow " "(i.e., jaundice)  • Negative: Patient sounds very sick or weak to the triager  • Negative: Discomfort when passing urine (e.g., pain, burning or stinging)  • Negative: Unusual vaginal discharge (e.g., bad smelling, yellow, green, or foamy-white)  • Negative: [1] Upper abdominal pains AND [2] radiate into chest, with sour taste in mouth  • Negative: [1] Upper abdominal pains AND [2] occur regularly about 1 hour after meals  • Negative: Abdominal pain is a chronic symptom (recurrent or ongoing AND present > 4 weeks)    Answer Assessment - Initial Assessment Questions  1. LOCATION: \"Where does it hurt?\"       Lower pelvic area  2. RADIATION: \"Does the pain shoot anywhere else?\" (e.g., chest, back)      vagina  3. ONSET: \"When did the pain begin?\" (Minutes, hours or days ago)       Yesterday after cervical check  4. ONSET: \"Gradual or sudden onset?\"      gradual  5. PATTERN: \"Does the pain come and go, or has it been constant since it started?\"       constant  6. SEVERITY: \"How bad is the pain?\" \"What does it keep you from doing?\"  (e.g., Scale 1-10; mild, moderate, or severe)    - MILD (1-3): doesn't interfere with normal activities, abdomen soft and not tender to touch     - MODERATE (4-7): interferes with normal activities or awakens from sleep, tender to touch     - SEVERE (8-10): excruciating pain, doubled over, unable to do any normal activities      mild  7. RECURRENT SYMPTOM: \"Have you ever had this type of abdominal pain before?\" If so, ask: \"When was the last time?\" and \"What happened that time?\"       no  8. CAUSE: \"What do you think is causing the abdominal pain?      Vaginal/cervical check  9. RELIEVING/AGGRAVATING FACTORS: \"What makes it better or worse?\" (e.g., antacids, bowel movement, movement)      movement  10. OTHER SYMPTOMS: \"Has there been any vaginal bleeding, fever, vomiting, diarrhea, or urine problems?\"        no  11. CHELSEY: \"What date are you expecting to deliver?\"        5 weeks    Protocols " used: PREGNANCY - ABDOMINAL PAIN GREATER THAN 20 WEEKS EGA-ADULT-AH

## 2018-10-02 ENCOUNTER — HOSPITAL ENCOUNTER (INPATIENT)
Facility: HOSPITAL | Age: 23
LOS: 4 days | Discharge: HOME | End: 2018-10-06
Attending: OBSTETRICS & GYNECOLOGY | Admitting: OBSTETRICS & GYNECOLOGY

## 2018-10-02 LAB
ABO GROUP BLD: NORMAL
BLD GP AB SCN SERPL QL: NEGATIVE
DEPRECATED RDW RBC AUTO: 40.5 FL (ref 40–54)
ERYTHROCYTE [DISTWIDTH] IN BLOOD BY AUTOMATED COUNT: 12.2 % (ref 12–15)
GIANT PLATELETS: ABNORMAL
HCT VFR BLD AUTO: 39.4 % (ref 37–47)
HGB BLD-MCNC: 13.6 G/DL (ref 12–16)
LYMPHOCYTES # BLD MANUAL: 2.88 10*3/MM3 (ref 0.72–4.86)
LYMPHOCYTES NFR BLD MANUAL: 27.6 % (ref 15–45)
LYMPHOCYTES NFR BLD MANUAL: 8.2 % (ref 4–12)
MCH RBC QN AUTO: 31.4 PG (ref 28–32)
MCHC RBC AUTO-ENTMCNC: 34.5 G/DL (ref 33–36)
MCV RBC AUTO: 91 FL (ref 82–98)
MONOCYTES # BLD AUTO: 0.85 10*3/MM3 (ref 0.19–1.3)
NEUTROPHILS # BLD AUTO: 5.32 10*3/MM3 (ref 1.87–8.4)
NEUTROPHILS NFR BLD MANUAL: 50 % (ref 39–78)
NEUTS BAND NFR BLD MANUAL: 1 % (ref 0–10)
PLATELET # BLD AUTO: 188 10*3/MM3 (ref 130–400)
PMV BLD AUTO: 12.7 FL (ref 6–12)
RBC # BLD AUTO: 4.33 10*6/MM3 (ref 4.2–5.4)
RBC MORPH BLD: NORMAL
RH BLD: POSITIVE
T&S EXPIRATION DATE: NORMAL
VARIANT LYMPHS NFR BLD MANUAL: 13.3 % (ref 0–5)
WBC MORPH BLD: NORMAL
WBC NRBC COR # BLD: 10.42 10*3/MM3 (ref 4.8–10.8)

## 2018-10-02 PROCEDURE — 85007 BL SMEAR W/DIFF WBC COUNT: CPT | Performed by: OBSTETRICS & GYNECOLOGY

## 2018-10-02 PROCEDURE — 25010000002 PENICILLIN G POTASSIUM PER 600000 UNITS: Performed by: OBSTETRICS & GYNECOLOGY

## 2018-10-02 PROCEDURE — 86850 RBC ANTIBODY SCREEN: CPT | Performed by: OBSTETRICS & GYNECOLOGY

## 2018-10-02 PROCEDURE — 85027 COMPLETE CBC AUTOMATED: CPT | Performed by: OBSTETRICS & GYNECOLOGY

## 2018-10-02 PROCEDURE — 86901 BLOOD TYPING SEROLOGIC RH(D): CPT | Performed by: OBSTETRICS & GYNECOLOGY

## 2018-10-02 PROCEDURE — 86900 BLOOD TYPING SEROLOGIC ABO: CPT | Performed by: OBSTETRICS & GYNECOLOGY

## 2018-10-02 RX ORDER — OXYTOCIN/RINGER'S LACTATE 20/1000 ML
125 PLASTIC BAG, INJECTION (ML) INTRAVENOUS AS NEEDED
Status: ACTIVE | OUTPATIENT
Start: 2018-10-02 | End: 2018-10-03

## 2018-10-02 RX ORDER — SODIUM CHLORIDE 0.9 % (FLUSH) 0.9 %
3-10 SYRINGE (ML) INJECTION AS NEEDED
Status: DISCONTINUED | OUTPATIENT
Start: 2018-10-02 | End: 2018-10-04 | Stop reason: HOSPADM

## 2018-10-02 RX ORDER — BUTORPHANOL TARTRATE 1 MG/ML
1 INJECTION, SOLUTION INTRAMUSCULAR; INTRAVENOUS
Status: DISCONTINUED | OUTPATIENT
Start: 2018-10-02 | End: 2018-10-04 | Stop reason: HOSPADM

## 2018-10-02 RX ORDER — BUTORPHANOL TARTRATE 1 MG/ML
2 INJECTION, SOLUTION INTRAMUSCULAR; INTRAVENOUS
Status: DISCONTINUED | OUTPATIENT
Start: 2018-10-02 | End: 2018-10-04 | Stop reason: HOSPADM

## 2018-10-02 RX ORDER — LIDOCAINE HYDROCHLORIDE 10 MG/ML
5 INJECTION, SOLUTION EPIDURAL; INFILTRATION; INTRACAUDAL; PERINEURAL AS NEEDED
Status: DISCONTINUED | OUTPATIENT
Start: 2018-10-02 | End: 2018-10-04 | Stop reason: HOSPADM

## 2018-10-02 RX ORDER — ACETAMINOPHEN 500 MG
1000 TABLET ORAL EVERY 6 HOURS PRN
Status: DISCONTINUED | OUTPATIENT
Start: 2018-10-02 | End: 2018-10-05

## 2018-10-02 RX ORDER — OXYTOCIN/0.9 % SODIUM CHLORIDE 30/500 ML
2-30 PLASTIC BAG, INJECTION (ML) INTRAVENOUS
Status: DISCONTINUED | OUTPATIENT
Start: 2018-10-02 | End: 2018-10-04 | Stop reason: HOSPADM

## 2018-10-02 RX ORDER — ONDANSETRON 4 MG/1
4 TABLET, ORALLY DISINTEGRATING ORAL EVERY 6 HOURS PRN
Status: DISCONTINUED | OUTPATIENT
Start: 2018-10-02 | End: 2018-10-04 | Stop reason: HOSPADM

## 2018-10-02 RX ORDER — TRISODIUM CITRATE DIHYDRATE AND CITRIC ACID MONOHYDRATE 500; 334 MG/5ML; MG/5ML
30 SOLUTION ORAL ONCE AS NEEDED
Status: DISCONTINUED | OUTPATIENT
Start: 2018-10-02 | End: 2018-10-04 | Stop reason: HOSPADM

## 2018-10-02 RX ORDER — TERBUTALINE SULFATE 1 MG/ML
0.25 INJECTION, SOLUTION SUBCUTANEOUS AS NEEDED
Status: DISCONTINUED | OUTPATIENT
Start: 2018-10-02 | End: 2018-10-04 | Stop reason: HOSPADM

## 2018-10-02 RX ORDER — SODIUM CHLORIDE, SODIUM LACTATE, POTASSIUM CHLORIDE, CALCIUM CHLORIDE 600; 310; 30; 20 MG/100ML; MG/100ML; MG/100ML; MG/100ML
125 INJECTION, SOLUTION INTRAVENOUS CONTINUOUS
Status: DISCONTINUED | OUTPATIENT
Start: 2018-10-02 | End: 2018-10-06

## 2018-10-02 RX ORDER — OXYTOCIN/RINGER'S LACTATE 20/1000 ML
999 PLASTIC BAG, INJECTION (ML) INTRAVENOUS ONCE
Status: COMPLETED | OUTPATIENT
Start: 2018-10-02 | End: 2018-10-04

## 2018-10-02 RX ORDER — PENICILLIN G 3000000 [IU]/50ML
3 INJECTION, SOLUTION INTRAVENOUS EVERY 4 HOURS
Status: COMPLETED | OUTPATIENT
Start: 2018-10-02 | End: 2018-10-03

## 2018-10-02 RX ORDER — ONDANSETRON 4 MG/1
4 TABLET, FILM COATED ORAL EVERY 6 HOURS PRN
Status: DISCONTINUED | OUTPATIENT
Start: 2018-10-02 | End: 2018-10-04 | Stop reason: HOSPADM

## 2018-10-02 RX ORDER — SODIUM CHLORIDE 0.9 % (FLUSH) 0.9 %
3 SYRINGE (ML) INJECTION EVERY 12 HOURS SCHEDULED
Status: DISCONTINUED | OUTPATIENT
Start: 2018-10-02 | End: 2018-10-04 | Stop reason: HOSPADM

## 2018-10-02 RX ORDER — VALACYCLOVIR HYDROCHLORIDE 500 MG/1
500 TABLET, FILM COATED ORAL DAILY
COMMUNITY
End: 2018-10-06 | Stop reason: HOSPADM

## 2018-10-02 RX ORDER — ONDANSETRON 2 MG/ML
4 INJECTION INTRAMUSCULAR; INTRAVENOUS EVERY 6 HOURS PRN
Status: DISCONTINUED | OUTPATIENT
Start: 2018-10-02 | End: 2018-10-04 | Stop reason: HOSPADM

## 2018-10-02 RX ADMIN — SODIUM CHLORIDE, POTASSIUM CHLORIDE, SODIUM LACTATE AND CALCIUM CHLORIDE 125 ML/HR: 600; 310; 30; 20 INJECTION, SOLUTION INTRAVENOUS at 21:21

## 2018-10-02 RX ADMIN — PENICILLIN G 3 MILLION UNITS: 3000000 INJECTION, SOLUTION INTRAVENOUS at 18:03

## 2018-10-02 RX ADMIN — SODIUM CHLORIDE 5 MILLION UNITS: 900 INJECTION INTRAVENOUS at 13:47

## 2018-10-02 RX ADMIN — SODIUM CHLORIDE, POTASSIUM CHLORIDE, SODIUM LACTATE AND CALCIUM CHLORIDE 125 ML/HR: 600; 310; 30; 20 INJECTION, SOLUTION INTRAVENOUS at 12:50

## 2018-10-02 RX ADMIN — PENICILLIN G 3 MILLION UNITS: 3000000 INJECTION, SOLUTION INTRAVENOUS at 22:20

## 2018-10-02 RX ADMIN — ACETAMINOPHEN 1000 MG: 500 TABLET, FILM COATED ORAL at 15:14

## 2018-10-02 RX ADMIN — OXYTOCIN-SODIUM CHLORIDE 0.9% IV SOLN 30 UNIT/500ML 2 MILLI-UNITS/MIN: 30-0.9/5 SOLUTION at 13:53

## 2018-10-02 NOTE — LACTATION NOTE
Initial Breastfeeding Teaching reviewed with patient and family. No questions at this time. Baby girl named Rocío.     Maternal Hx: , Amenorrhea, Galactorrhea not associated with childbirth, hyperprolactinemia, Prolactinoma  Prenatal Meds:PNV  Pump: Rx requested

## 2018-10-02 NOTE — PLAN OF CARE
Problem: Patient Care Overview  Goal: Plan of Care Review  Outcome: Ongoing (interventions implemented as appropriate)  Pt has been on Pitocin since early afternoon and has not had any significant discomfort with contractions.  Pt had a mild headache that was relieved with Tylenol.  There is a hematoma that was visualized on U/S in the cord.  Fetal tracing has been reassuring since arrival.   10/02/18 4469   Coping/Psychosocial   Plan of Care Reviewed With patient     Goal: Individualization and Mutuality  Outcome: Ongoing (interventions implemented as appropriate)    Goal: Discharge Needs Assessment  Outcome: Ongoing (interventions implemented as appropriate)      Problem: Labor (Cervical Ripen, Induct, Augment) (Adult,Obstetrics,Pediatric)  Goal: Signs and Symptoms of Listed Potential Problems Will be Absent, Minimized or Managed (Labor)  Outcome: Ongoing (interventions implemented as appropriate)

## 2018-10-03 LAB
A1 MICROGLOB PLACENTAL VAG QL: POSITIVE
A1 MICROGLOB PLACENTAL VAG QL: POSITIVE
EXPIRATION DATE: ABNORMAL
Lab: ABNORMAL
PROT UR STRIP-MCNC: ABNORMAL MG/DL

## 2018-10-03 PROCEDURE — 81002 URINALYSIS NONAUTO W/O SCOPE: CPT | Performed by: OBSTETRICS & GYNECOLOGY

## 2018-10-03 PROCEDURE — 3E0P7VZ INTRODUCTION OF HORMONE INTO FEMALE REPRODUCTIVE, VIA NATURAL OR ARTIFICIAL OPENING: ICD-10-PCS | Performed by: OBSTETRICS & GYNECOLOGY

## 2018-10-03 PROCEDURE — 84112 EVAL AMNIOTIC FLUID PROTEIN: CPT | Performed by: OBSTETRICS & GYNECOLOGY

## 2018-10-03 PROCEDURE — 25010000002 PENICILLIN G POTASSIUM PER 600000 UNITS: Performed by: OBSTETRICS & GYNECOLOGY

## 2018-10-03 PROCEDURE — 25010000002 BUTORPHANOL PER 1 MG: Performed by: OBSTETRICS & GYNECOLOGY

## 2018-10-03 RX ORDER — PENICILLIN G POTASSIUM 5000000 [IU]/1
3 INJECTION, POWDER, FOR SOLUTION INTRAMUSCULAR; INTRAVENOUS EVERY 4 HOURS
Status: DISCONTINUED | OUTPATIENT
Start: 2018-10-03 | End: 2018-10-03 | Stop reason: SDUPTHER

## 2018-10-03 RX ORDER — PENICILLIN G 3000000 [IU]/50ML
3 INJECTION, SOLUTION INTRAVENOUS EVERY 4 HOURS
Status: DISCONTINUED | OUTPATIENT
Start: 2018-10-03 | End: 2018-10-05

## 2018-10-03 RX ORDER — METHYLERGONOVINE MALEATE 0.2 MG/ML
200 INJECTION INTRAVENOUS ONCE AS NEEDED
Status: DISCONTINUED | OUTPATIENT
Start: 2018-10-03 | End: 2018-10-04 | Stop reason: HOSPADM

## 2018-10-03 RX ORDER — MISOPROSTOL 200 UG/1
800 TABLET ORAL AS NEEDED
Status: DISCONTINUED | OUTPATIENT
Start: 2018-10-03 | End: 2018-10-04 | Stop reason: HOSPADM

## 2018-10-03 RX ORDER — CARBOPROST TROMETHAMINE 250 UG/ML
250 INJECTION, SOLUTION INTRAMUSCULAR AS NEEDED
Status: DISCONTINUED | OUTPATIENT
Start: 2018-10-03 | End: 2018-10-04 | Stop reason: HOSPADM

## 2018-10-03 RX ADMIN — PENICILLIN G 3 MILLION UNITS: 3000000 INJECTION, SOLUTION INTRAVENOUS at 10:33

## 2018-10-03 RX ADMIN — PENICILLIN G 3 MILLION UNITS: 3000000 INJECTION, SOLUTION INTRAVENOUS at 20:00

## 2018-10-03 RX ADMIN — PENICILLIN G 3 MILLION UNITS: 3000000 INJECTION, SOLUTION INTRAVENOUS at 06:30

## 2018-10-03 RX ADMIN — OXYTOCIN-SODIUM CHLORIDE 0.9% IV SOLN 30 UNIT/500ML 2 MILLI-UNITS/MIN: 30-0.9/5 SOLUTION at 16:01

## 2018-10-03 RX ADMIN — PENICILLIN G 3 MILLION UNITS: 3000000 INJECTION, SOLUTION INTRAVENOUS at 02:30

## 2018-10-03 RX ADMIN — SODIUM CHLORIDE, POTASSIUM CHLORIDE, SODIUM LACTATE AND CALCIUM CHLORIDE 125 ML/HR: 600; 310; 30; 20 INJECTION, SOLUTION INTRAVENOUS at 06:42

## 2018-10-03 RX ADMIN — PENICILLIN G 3 MILLION UNITS: 3000000 INJECTION, SOLUTION INTRAVENOUS at 13:46

## 2018-10-03 RX ADMIN — BUTORPHANOL TARTRATE 1 MG: 1 INJECTION, SOLUTION INTRAMUSCULAR; INTRAVENOUS at 06:37

## 2018-10-03 RX ADMIN — BUTORPHANOL TARTRATE 1 MG: 1 INJECTION, SOLUTION INTRAMUSCULAR; INTRAVENOUS at 13:46

## 2018-10-03 NOTE — PAYOR COMM NOTE
"[facility]  [facility]   [phone]  Or   Fax   [phone]    Shira Adler  (22 y.o. Female)     Date of Birth Social Security Number Address Home Phone MRN    1995  [address] [facility] [id number] [phone] [id number]    Gnosticism Marital Status          None Single       Admission Date Admission Type Admitting Provider Attending Provider Department, Room/Bed    10/2/18 Elective Xiomara Rees MD Mueller, Susan Kathleen, MD [facility] LABOR DELIVERY, L6/1    Discharge Date Discharge Disposition Discharge Destination                       Attending Provider:  Xiomara Rees MD    Allergies:  No Known Allergies    Isolation:  None   Infection:  None   Code Status:  CPR    Ht:  161.5 cm (63.6\")   Wt:  102 kg (224 lb)    Admission Cmt:  None   Principal Problem:  None                Active Insurance as of 10/2/2018     Primary Coverage     Payor Plan Insurance Group Employer/Plan Group    [place] MEDICAID [employer] MEDICAID [id number]     Payor Plan Address Payor Plan Phone Number Effective From Effective To    [address] [address] [phone] 6/1/2016     [facility] [address]       Subscriber Name Subscriber Birth Date Member ID       SHIRA ADLER 1995 [id number]                 Emergency Contacts      (Rel.) Home Phone Work Phone Mobile Phone    Michelle Zarco (Mother) -- -- [phone]    Ayo Ricks (Other) [phone] -- --        10/2/18    edc 10/8/18   39/2 weeks gestational age  g-1 p-0   Dilation 2-3   Irregular contractions 3-5 minutes.  Oxytocin ggt     10/3/18 remains in ldr  Dilation of 2-3 effacement 70%  Fetal station -2.  contrac tion 2-3 minutes mild by palpations  Contraction pattern is regular.   Fetal heart rate 130-135   piticon ggt continues       Contraction Frequency (Minutes)  2-6  2-5  2-4    2-4   Fetal HR Variability             Fetal HR Accelerations             Fetal HR " Decelerations             Fetal HR (Beats/Min)             Contraction Frequency (min)             Fetal Assessment   Fetal Movement  active  active    active  active   Fetal HR Assessment Method  external  external    external  external   Fetal HR (beats/min)  130  135    135  140   Fetal Heart Baseline Rate  normal range  normal range    normal range  normal range   Fetal HR Variability  moderate (ampl-  itude range 6  to 25 bpm)  moderate (ampl-  itude range 6  to 25 bpm)    moderate (ampl-  itude range 6  to 25 bpm)  moderate (ampl-  itude range 6  to 25 bpm)   Fetal HR Accelerations  greater than/  equal to 15  bpm;lasting at  least 15 secon-  ds  greater than/  equal to 15  bpm;lasting at  least 15 secon-  ds    greater than/  equal to 15  bpm;lasting at  least 15 secon-  ds  absent   Fetal HR Decelerations  absent  absent    variable  late   Fetal HR Tracing Category  Category I  Category I    Category I     Sinusoidal Pattern Present  absent  absent    absent  absent   Fetal Presentation                   Michaelle Portillo, RN Registered Nurse Signed Obstetrics  Plan of Care Date of Service: 10/2/2018  5:01 PM         Problem: Patient Care Overview  Goal: Plan of Care Review  Outcome: Ongoing (interventions implemented as appropriate)  Pt has been on Pitocin since early afternoon and has not had any significant discomfort with contractions.  Pt had a mild headache that was relieved with Tylenol.  There is a hematoma that was visualized on U/S in the cord.  Fetal tracing has been reassuring since arrival.    10/02/18 4243   Coping/Psychosocial   Plan of Care Reviewed With patient      Goal: Individualization and Mutuality  Outcome: Ongoing (interventions implemented as appropriate)     Goal: Discharge Needs Assessment  Outcome: Ongoing (interventions implemented as appropriate)        Problem: Labor (Cervical Ripen, Induct, Augment) (Adult,Obstetrics,Pediatric)  Goal: Signs and Symptoms of Listed Potential  Problems Will be Absent, Minimized or Managed (Labor)  Outcome: Ongoing (interventions implemented as appropriate)                       Labor & Delivery Timeline (9/6/2018 10:58 to 10/3/2018 11:58)     10/3/2018 Event Details User   11:32 Other Flowsheet Documentation Other flowsheet entries   BP: 152/87 (Device Time: 11:31:31) Heart Rate: 78 (Device Time: 11:31:31)    Cris Mcnally RN   11:30 Other Flowsheet Documentation Other flowsheet entries   Pain Rating (0-10): Rest: 6 Contraction Frequency (Minutes): 2-6    Cris Mcnally RN   11:15 Other Flowsheet Documentation Other flowsheet entries   Temp: 98.3 °F (36.8 °C) Temp src: Oral    Cris Mcnally RN   11:02 Other Flowsheet Documentation Other flowsheet entries   BP: 141/81 (Device Time: 11:01:35) Heart Rate: 71 (Device Time: 11:01:35)    Cris Mcnally RN   11:00 Other Flowsheet Documentation Other flowsheet entries   Pain Rating (0-10): Rest: 6 Contraction Frequency (Minutes): 2-5    Cris Mcnally RN   10:58 Other Flowsheet Documentation Other flowsheet entries   Response: No new orders Provider Name: Dr. Rees   Notification Route: In person, on unit     Cris Mcnally RN   10:33 Medication New Bag penicillin G in iso-osmotic dextrose IVPB 3 million units (premix) -  Dose: 3 Million Units ; Route: Intravenous ; Line: Peripheral IV 10/02/18 1249 Left Forearm ; Scheduled Time: 1000  Cris Mcnally RN   10:31 Other Flowsheet Documentation Other flowsheet entries   BP: 148/96 (Device Time: 10:31:28) Heart Rate: 79 (Device Time: 10:31:28)    Cris Mcnally RN   10:30 Other Flowsheet Documentation Other flowsheet entries   Contraction Frequency (Minutes): 2-4     Cris Mcnally RN   10:01 Other Flowsheet Documentation Other flowsheet entries   BP: 146/80 (Device Time: 10:01:21) Heart Rate: 79 (Device Time: 10:01:21)    Cris Mcnally RN   10:00 Other Flowsheet Documentation Other flowsheet entries   Pain Rating (0-10): Rest: 6 Contraction Frequency  (Minutes): 2-4    Cris Mcnally RN   09:40 Other Flowsheet Documentation Other flowsheet entries   BP: 130/63 (Device Time: 09:39:36) Heart Rate: 83 (Device Time: 09:39:36)   Pain Rating (0-10): Rest: 5     Cris Mcnally RN   09:30 Other Flowsheet Documentation Other flowsheet entries   Contraction Frequency (Minutes): 2-5     Cris Mcnally RN   09:13 Other Flowsheet Documentation Other flowsheet entries   BP: 133/95 (Device Time: 09:12:35) Temp: 97.5 °F (36.4 °C)   Temp src: Temporal Art Heart Rate: 92 (Device Time: 09:12:35)    Cris Mcnally RN   09:00 Other Flowsheet Documentation Other flowsheet entries   Pain Rating (0-10): Rest: 4 Contraction Frequency (Minutes): 2-4    Cris Mcnally RN   08:43 Other Flowsheet Documentation Other flowsheet entries   BP: 146/86 (Device Time: 08:42:45) Heart Rate: 75 (Device Time: 08:42:45)    Cris Mcnally RN   08:31 Other Flowsheet Documentation Other flowsheet entries   BP: 160/96 (Device Time: 08:31:19) Heart Rate: 76 (Device Time: 08:31:19)    Cris Mcnally RN   08:30 Other Flowsheet Documentation Other flowsheet entries   Pain Rating (0-10): Rest: 4 Contraction Frequency (Minutes): 1-3    Cris Mcnally RN   08:01 Other Flowsheet Documentation Other flowsheet entries   BP: 145/100 (Device Time: 08:01:17) Heart Rate: 68 (Device Time: 08:01:17)    Cris Mcnally RN   08:00 Other Flowsheet Documentation Other flowsheet entries   Pain Rating (0-10): Rest: 4 Contraction Frequency (Minutes): 1-3    Cris Mcnally RN   07:31 Other Flowsheet Documentation Other flowsheet entries   BP: 128/81 (Device Time: 07:31:24) Temp: 97.2 °F (36.2 °C)   Temp src: Temporal Art Heart Rate: 74 (Device Time: 07:31:24)   Resp: 18     Mcnally, Cris N, RN   07:30 Other Flowsheet Documentation Other flowsheet entries   Pain Rating (0-10): Rest: 4 Contraction Frequency (Minutes): 2-4    Crsi Mcnally, RN   07:20 Other Flowsheet Documentation Other flowsheet entries   Pain Rating  (0-10): Rest: 4 Coping (labor pain only): States she is coping    Cris Mcnally RN   07:20 Peripheral IV 10/02/18 1249 Left Forearm Assessment Phlebitis: 0-->no symptoms Site Assessment: Clean; Dry; Intact   Dressing Status: Clean; Dry; Intact Line Status: Infusing   Dressing Type: Transparent     Cris Mcnally RN   07:00 Other Flowsheet Documentation Other flowsheet entries   BP: 141/77 (Device Time: 07:01:22) Heart Rate: 84 (Device Time: 07:01:22)   Resp: 18 Contraction Frequency (Minutes): 2-4    Linsey Null RN   06:42 Medication New Bag lactated ringers infusion -  Dose: 125 mL/hr ; Rate: 125 mL/hr ; Route: Intravenous ; Line: Peripheral IV 10/02/18 1249 Left Forearm ; Scheduled Time: 0642  Linsey Null RN   06:41 Medication Stopped lactated ringers infusion -  Route: Intravenous ; Line: Peripheral IV 10/02/18 1249 Left Forearm ; Scheduled Time: 0641  Linsey Null RN   06:37 Other Flowsheet Documentation Other flowsheet entries   Pain Rating (0-10): Rest: 6     Linsey Null RN   06:37 Medication Given butorphanol (STADOL) injection 1 mg -  Dose: 1 mg ; Route: Intravenous ; Line: Peripheral IV 10/02/18 1249 Left Forearm  Linsey Null RN   06:30 Other Flowsheet Documentation Other flowsheet entries   Contraction Frequency (Minutes): 2-3     Linsey Null RN   06:30 Medication New Bag penicillin G in iso-osmotic dextrose IVPB 3 million units (premix) -  Dose: 3 Million Units ; Rate: 100 mL/hr ; Route: Intravenous ; Line: Peripheral IV 10/02/18 1249 Left Forearm ; Scheduled Time: 0600  Linsey Null RN   06:01 Other Flowsheet Documentation Other flowsheet entries   Pain Rating (0-10): Activity: 5 Pain Rating (0-10): Rest: 5    Linsey Null RN   06:00 Other Flowsheet Documentation Other flowsheet entries   BP: 151/91 (Device Time: 06:01:23) Temp: 98.8 °F (37.1 °C)   Temp src: Oral Heart Rate: 81 (Device Time: 06:01:23)   Resp: 18 Contraction Frequency (Minutes): 1.5-2    Linsey Null RN    05:30 Other Flowsheet Documentation Other flowsheet entries   BP: 151/76 (Device Time: 05:31:33) Heart Rate: 63 (Device Time: 05:31:33)   Contraction Frequency (Minutes): 2-3     [place], Linsey A, RN   05:05 Other Flowsheet Documentation Other flowsheet entries   Pain Rating (0-10): Activity: 5 Pain Rating (0-10): Rest: 5    [place], Linsey A, RN   05:00 Other Flowsheet Documentation Other flowsheet entries   BP: 151/87 (Device Time: 05:01:33) Heart Rate: 63 (Device Time: 05:01:33)   Contraction Frequency (Minutes): 1.5-2     [place], Linsey A, RN   04:30 Other Flowsheet Documentation Other flowsheet entries   BP: 152/86 (Device Time: 04:31:27) Heart Rate: 62 (Device Time: 04:31:27)   Contraction Frequency (Minutes): 2-3     [place], Linsey A, RN   04:03 Other Flowsheet Documentation Other flowsheet entries   Pain Rating (0-10): Activity: 4 Pain Rating (0-10): Rest: 4    [place], Linsey A, RN   04:00 Other Flowsheet Documentation Other flowsheet entries   BP: 140/92 (Device Time: 04:01:29) Heart Rate: 56 (Device Time: 04:01:29)   Contraction Frequency (Minutes): 2-3     [place], Linsey A, RN   03:56 Other Flowsheet Documentation Other flowsheet entries   Temp: 98.9 °F (37.2 °C) Temp src: Oral   Resp: 16     [place], Linsey A, RN   03:32 Other Flowsheet Documentation Other flowsheet entries   BP: 129/76 (Device Time: 03:31:49) Heart Rate: 56 (Device Time: 03:31:49)    [place], Linsey A, RN   03:30 Other Flowsheet Documentation Other flowsheet entries   BP: 129/76 (Device Time: 03:31:49) Heart Rate: 56 (Device Time: 03:31:49)   Contraction Frequency (Minutes): 2-6     [place], Linsey A, RN   03:00 Other Flowsheet Documentation Other flowsheet entries   BP: 136/83 (Device Time: 03:01:29) Heart Rate: 61 (Device Time: 03:01:29)   Resp: 16 Pain Rating (0-10): Activity: 4   Pain Rating (0-10): Rest: 4 Contraction Frequency (Minutes): 2-3    Linsey Null RN   02:56 Specimens Collected PAMG-1, Rapid Assay For ROM - Amniotic Fluid, Amniotic Sac   - ID: [id number] Type: Amniotic Fluid  Linsey Null RN   02:50:17 Orders Placed PAMG-1, Rapid Assay For ROM - Amniotic Fluid, Amniotic Sac  Xiomara Rese MD   02:37 Rupture of Membranes Type: Intact, spontaneous rupture of membranes; Baby: Garcia, Pending  Linsey Null RN   02:31:46 Orders Placed POC Protein, Urine, Qualitative, Dipstick  Xiomara Rees MD   02:30 Other Flowsheet Documentation Other flowsheet entries   Resp: 18 Contraction Frequency (Minutes): 2-3    Linsey Null RN   02:30 Medication New Bag penicillin G in iso-osmotic dextrose IVPB 3 million units (premix) -  Dose: 3 Million Units ; Rate: 100 mL/hr ; Route: Intravenous ; Line: Peripheral IV 10/02/18 1249 Left Forearm ; Scheduled Time: 0200  Linsey Null RN   02:01 Other Flowsheet Documentation Other flowsheet entries   BP: 139/72 (Device Time: 02:01:25) Heart Rate: 52 (Device Time: 02:01:25)    Linsey Null RN   02:00 Other Flowsheet Documentation Other flowsheet entries   Pain Rating (0-10): Activity: 4 Pain Rating (0-10): Rest: 4   Contraction Frequency (Minutes): 2-3     Linsey Null RN   01:55:58 Orders Placed methylergonovine (METHERGINE) injection 200 mcg ; carboprost (HEMABATE) injection 250 mcg ; misoprostol (CYTOTEC) tablet 800 mcg ; Tissue Pathology Exam  Xiomara Rees MD   01:45 Medication Hold Oxytocin-Lactated Ringers (PITOCIN) 20 UNIT/L in lactated Ringer's 1000 mL IVPB -  Dose: 0 mL/hr ; Rate: 0 mL/hr ; Route: Intravenous ; Line: Peripheral IV 10/02/18 1249 Left Forearm ; Reason: Order parameters not met ; Scheduled Date: 10/2/18;  ; Scheduled Time: 1515  iLnsey Null RN   01:42 Other Flowsheet Documentation Other flowsheet entries   Pain Rating (0-10): Activity: 4 Pain Rating (0-10): Rest: 4    Linsey Null RN   01:35 Other Flowsheet Documentation Other flowsheet entries   Temp: 99.1 °F (37.3 °C) Temp src: Oral   Resp: 18     Linsey Null RN   01:31 Other Flowsheet  Documentation Other flowsheet entries   BP: 142/72 (Device Time: 01:31:28) Heart Rate: 52 (Device Time: 01:31:28)    [place] MARIO, RN   01:30 Other Flowsheet Documentation Other flowsheet entries   Contraction Frequency (Minutes): 1.5-5     [place], Linsey A, RN   01:05 Other Flowsheet Documentation Other flowsheet entries   Resp: 18     [place], Linsey A, RN   01:01 Other Flowsheet Documentation Other flowsheet entries   BP: 147/88 (Device Time: 01:01:23) Heart Rate: 78 (Device Time: 01:01:23)    [place] A, RN   01:00 Other Flowsheet Documentation Other flowsheet entries   Pain Rating (0-10): Activity: 3 Pain Rating (0-10): Rest: 3   Contraction Frequency (Minutes): 2-3     [place] Linsey A, RN   00:35 Other Flowsheet Documentation Other flowsheet entries   Resp: 18     [place] Linsey A, RN   00:31 Other Flowsheet Documentation Other flowsheet entries   BP: 145/86 (Device Time: 00:31:21) Heart Rate: 91 (Device Time: 00:31:21)    [place] ve[place] A, RN   00:30 Other Flowsheet Documentation Other flowsheet entries   Contraction Frequency (Minutes): 1.5-3     [place] Linsey A, RN   00:00 Other Flowsheet Documentation Other flowsheet entries   BP: 154/87 (Device Time: 00:01:35) Temp: 98.7 °F (37.1 °C)   Temp src: Oral Heart Rate: 61 (Device Time: 00:01:35)   Resp: 18 Pain Rating (0-10): Activity: 0   Pain Rating (0-10): Rest: 0 Contraction Frequency (Minutes): 2-3    [place] Linsey A, RN       10/2/2018 Event Details User   23:35 Other Flowsheet Documentation Other flowsheet entries   Resp: 18     [place] Linsey A, RN   23:31 Other Flowsheet Documentation Other flowsheet entries   BP: 150/87 (Device Time: 23:31:21) Heart Rate: 71 (Device Time: 23:31:21)    [place] A, RN   23:30 Other Flowsheet Documentation Other flowsheet entries   Contraction Frequency (Minutes): 2     [place] Linsey A, RN   23:20 Other Flowsheet Documentation Other flowsheet entries   BP: 140/74 (Device Time: 23:20:00) Heart Rate: 88 (Device Time: 23:20:00)    Resp: 18     Linsey Null RN   23:00 Other Flowsheet Documentation Other flowsheet entries   Pain Rating (0-10): Activity: 0 Pain Rating (0-10): Rest: 0   Contraction Frequency (Minutes): 2-4     Linsey Null RN   22:35 Other Flowsheet Documentation Other flowsheet entries   Resp: 18     Linsey Null, RN   22:31 Other Flowsheet Documentation Other flowsheet entries   BP: 153/89 (Device Time: 22:31:24) Heart Rate: 66 (Device Time: 22:31:24)    Linsey Null RN   22:30 Other Flowsheet Documentation Other flowsheet entries   Contraction Frequency (Minutes): 2-3.5     Linsey Null RN   22:20 Medication New Bag penicillin G in iso-osmotic dextrose IVPB 3 million units (premix) -  Dose: 3 Million Units ; Rate: 100 mL/hr ; Route: Intravenous ; Line: Peripheral IV 10/02/18 1249 Left Forearm ; Scheduled Time: 2200  Linsey Null RN   22:05 Other Flowsheet Documentation Other flowsheet entries   Resp: 18     Linsey Null RN   22:01 Other Flowsheet Documentation Other flowsheet entries   BP: 159/86 (Device Time: 22:01:22) Heart Rate: 63 (Device Time: 22:01:22)    Linsey Null RN   22:00 Other Flowsheet Documentation Other flowsheet entries   Pain Rating (0-10): Activity: 0 Pain Rating (0-10): Rest: 0   Contraction Frequency (Minutes): 2-4     Linsey Null RN   21:35 Other Flowsheet Documentation Other flowsheet entries   Resp: 18     Linsey Null RN   21:31 Other Flowsheet Documentation Other flowsheet entries   BP: 155/81 (Device Time: 21:31:22) Heart Rate: 70 (Device Time: 21:31:22)    Linsey Null RN   21:30 Other Flowsheet Documentation Other flowsheet entries   Contraction Frequency (Minutes): 2-3     Linsey Null RN   21:21 Medication New Bag lactated ringers infusion -  Dose: 125 mL/hr ; Rate: 125 mL/hr ; Route: Intravenous ; Line: Peripheral IV 10/02/18 1249 Left Forearm ; Scheduled Time: 2121  Linsey Null RN   21:20 Other Flowsheet Documentation Other flowsheet entries   BP:  142/86 (Device Time: 21:17:38) Heart Rate: 83 (Device Time: 21:17:38)   Resp: 18     Linsey Null RN   21:19 Medication Stopped lactated ringers infusion -  Route: Intravenous ; Line: Peripheral IV 10/02/18 1249 Left Forearm ; Scheduled Time: 2119  Linsey Null RN   21:05 Other Flowsheet Documentation Other flowsheet entries   Resp: 18     Linsey Null RN   21:01 Other Flowsheet Documentation Other flowsheet entries   BP: 142/74 (Device Time: 21:01:25) Heart Rate: 71 (Device Time: 21:01:25)    Linsey Null RN   21:00 Other Flowsheet Documentation Other flowsheet entries   Contraction Frequency (Minutes): 2-3     Linsey Null RN   20:35 Other Flowsheet Documentation Other flowsheet entries   Resp: 18     Linsey Null RN   20:31 Other Flowsheet Documentation Other flowsheet entries   BP: 141/82 (Device Time: 20:31:23) Heart Rate: 67 (Device Time: 20:31:23)    Linsey Null RN   20:30 Other Flowsheet Documentation Other flowsheet entries   Contraction Frequency (Minutes): 2-3.5     Linsey Null RN   20:05 Other Flowsheet Documentation Other flowsheet entries   Resp: 18     Linsey Null RN   20:01 Other Flowsheet Documentation Other flowsheet entries   BP: 146/90 (Device Time: 20:01:20) Heart Rate: 74 (Device Time: 20:01:20)    Linsey Null RN   20:00 Other Flowsheet Documentation Other flowsheet entries   Pain Rating (0-10): Activity: 0 Pain Rating (0-10): Rest: 0   Contraction Frequency (Minutes): 2-3.5 Coping (labor pain only): States she is coping    Linsey Null RN   20:00 Peripheral IV 10/02/18 1249 Left Forearm Assessment Phlebitis: 0-->no symptoms Site Assessment: Clean; Dry; Intact   Dressing Status: Clean; Dry; Intact Line Status: Infusing   Reason Not Rotated: Not due Dressing Type: Transparent    Linsey Null RN   19:35 Other Flowsheet Documentation Other flowsheet entries   Temp: 98.8 °F (37.1 °C) Temp src: Temporal Art   Resp: 18     Linsey Null RN   19:31 Other  Flowsheet Documentation Other flowsheet entries   BP: 142/77 (Device Time: 19:31:21) Heart Rate: 67 (Device Time: 19:31:21)    Linsey Null RN   19:30 Other Flowsheet Documentation Other flowsheet entries   Contraction Frequency (Minutes): 2-3     Linsey Null RN   19:05 Other Flowsheet Documentation Other flowsheet entries   Resp: 18     Linsey Null RN   19:01 Other Flowsheet Documentation Other flowsheet entries   BP: 143/82 (Device Time: 19:01:18) Heart Rate: 74 (Device Time: 19:01:18)    Linsey Null RN   18:59 Medication Rate/Dose Change Oxytocin-Sodium Chloride (PITOCIN) 30-0.9 UT/500ML-% infusion solution -  Dose: 20 cali-units/min ; Rate: 20 mL/hr ; Route: Intravenous ; Line: Peripheral IV 10/02/18 1249 Left Forearm ; Scheduled Time: 1859  Michaelle Portillo RN   18:58 Other Flowsheet Documentation Other flowsheet entries   Contraction Frequency (Minutes): 2-3     Michaelle Portillo RN   18:37 Medication Rate/Dose Change Oxytocin-Sodium Chloride (PITOCIN) 30-0.9 UT/500ML-% infusion solution -  Dose: 18 cali-units/min ; Rate: 18 mL/hr ; Route: Intravenous ; Line: Peripheral IV 10/02/18 1249 Left Forearm ; Scheduled Time: 1837  Michaelle Portillo RN   18:31 Other Flowsheet Documentation Other flowsheet entries   BP: 138/79 (Device Time: 18:31:15) Heart Rate: 87 (Device Time: 18:31:15)   Resp: 18     Michaelle Portillo RN   18:04 Other Flowsheet Documentation Other flowsheet entries   BP: 155/95 (Device Time: 18:03:47) Heart Rate: 81 (Device Time: 18:03:47)    Michaelle Portillo RN   18:03 Medication New Bag penicillin G in iso-osmotic dextrose IVPB 3 million units (premix) -  Dose: 3 Million Units ; Route: Intravenous ; Line: Peripheral IV 10/02/18 1249 Left Forearm ; Scheduled Time: 1800  Michaelle Portillo RN   18:01 Other Flowsheet Documentation Other flowsheet entries   BP:  157/103 (Pt had arm bent for this BP) Heart Rate: 72 (Device Time: 18:01:11)   Resp: 18     Michaelle Portillo RN    18:00 Other Flowsheet Documentation Other flowsheet entries   Contraction Frequency (Minutes): 2-3     Michaelle Portillo RN   18:00 Medication Rate/Dose Change Oxytocin-Sodium Chloride (PITOCIN) 30-0.9 UT/500ML-% infusion solution -  Dose: 16 cali-units/min ; Rate: 16 mL/hr ; Route: Intravenous ; Line: Peripheral IV 10/02/18 1249 Left Forearm ; Scheduled Time: 1800  Michaelle Portillo RN   17:31 Other Flowsheet Documentation Other flowsheet entries   BP: 146/82 (Device Time: 17:31:12) Heart Rate: 73 (Device Time: 17:31:12)   Contraction Frequency (Minutes): 2-5     Michaelle Portillo RN   17:07 Other Flowsheet Documentation Other flowsheet entries   BP: 129/73 (Device Time: 17:06:45) Heart Rate: 79 (Device Time: 17:06:45)   Resp: 18     Michaelle Portillo RN   17:00 Other Flowsheet Documentation Other flowsheet entries   Temp: 98.1 °F (36.7 °C) Temp src: Temporal Art   Contraction Frequency (Minutes): 3-4     Michaelle Portillo RN   17:00 Medication Rate/Dose Change Oxytocin-Sodium Chloride (PITOCIN) 30-0.9 UT/500ML-% infusion solution -  Dose: 14 cali-units/min ; Rate: 14 mL/hr ; Route: Intravenous ; Line: Peripheral IV 10/02/18 1249 Left Forearm ; Scheduled Time: 1700  Michaelle Portillo RN   16:33 Other Flowsheet Documentation Other flowsheet entries   Contraction Frequency (Minutes): 3-4     Michaelle Portillo RN   16:33 Medication Rate/Dose Change Oxytocin-Sodium Chloride (PITOCIN) 30-0.9 UT/500ML-% infusion solution -  Dose: 12 cali-units/min ; Rate: 12 mL/hr ; Route: Intravenous ; Line: Peripheral IV 10/02/18 1249 Left Forearm ; Scheduled Time: 1633  Michaelle Portillo RN   16:00 Other Flowsheet Documentation Other flowsheet entries   Pain Rating (0-10): Rest: 2 Contraction Frequency (Minutes): 2-4    Michaelle Portillo RN   16:00 Medication Rate/Dose Change Oxytocin-Sodium Chloride (PITOCIN) 30-0.9 UT/500ML-% infusion solution -  Dose: 10 cali-units/min ; Rate: 10 mL/hr ; Route: Intravenous ; Line:  Peripheral IV 10/02/18 1249 Left Forearm ; Scheduled Time: 1600  Michaelle Portillo RN   15:28 Other Flowsheet Documentation Other flowsheet entries   Contraction Frequency (Minutes): Occasional     Michaelle Portillo RN   15:28 Medication Rate/Dose Change Oxytocin-Sodium Chloride (PITOCIN) 30-0.9 UT/500ML-% infusion solution -  Dose: 8 cali-units/min ; Rate: 8 mL/hr ; Route: Intravenous ; Line: Peripheral IV 10/02/18 1249 Left Forearm ; Scheduled Time: 1528  Michaelle Portillo RN   15:14 Other Flowsheet Documentation Other flowsheet entries   Pain Rating (0-10): Rest: 3     Michaelle Portillo RN   15:14 Medication Given acetaminophen (TYLENOL) tablet 1,000 mg -  Dose: 1,000 mg ; Route: Oral  Michaelle Portillo RN   15:08:39 Orders Placed acetaminophen (TYLENOL) tablet 1,000 mg  Xiomara Rees MD   15:00 Other Flowsheet Documentation Other flowsheet entries   Contraction Frequency (Minutes): 3-5     Michaelle Portillo RN   15:00 Medication Rate/Dose Change Oxytocin-Sodium Chloride (PITOCIN) 30-0.9 UT/500ML-% infusion solution -  Dose: 6 cali-units/min ; Rate: 6 mL/hr ; Route: Intravenous ; Line: Peripheral IV 10/02/18 1249 Left Forearm ; Scheduled Time: 1500  Michaelle Portillo RN   14:36 Other Flowsheet Documentation Other flowsheet entries   BP: 150/86 (Device Time: 14:36:18) Heart Rate: 79 (Device Time: 14:36:18)   Resp: 18     Michaelle Portillo RN   14:30 Other Flowsheet Documentation Other flowsheet entries   Contraction Frequency (Minutes): 3-4     Michaelle Portillo RN   14:30 Medication Rate/Dose Change Oxytocin-Sodium Chloride (PITOCIN) 30-0.9 UT/500ML-% infusion solution -  Dose: 4 cali-units/min ; Rate: 4 mL/hr ; Route: Intravenous ; Line: Peripheral IV 10/02/18 1249 Left Forearm ; Scheduled Time: 1430  Michaelle Portillo RN   14:28:25 Orders Placed Oxytocin-Lactated Ringers (PITOCIN) 20 UNIT/L in lactated Ringer's 1000 mL IVPB  Xiomara Rees MD   14:28:24 Orders Placed  Oxytocin-Lactated Ringers (PITOCIN) 20 UNIT/L in lactated Ringer's 1000 mL IVPB  Xiomara Rees MD   14:00 Other Flowsheet Documentation Other flowsheet entries   Contraction Frequency (Minutes): 3-4     Michaelle Portillo RN   13:53 Medication New Bag Oxytocin-Sodium Chloride (PITOCIN) 30-0.9 UT/500ML-% infusion solution -  Dose: 2 cali-units/min ; Rate: 2 mL/hr ; Route: Intravenous ; Line: Peripheral IV 10/02/18 1249 Left Forearm ; Scheduled Time: 1345  Michaelle Portillo RN   13:47 Medication New Bag penicillin g 5 mu/100 mL 0.9% NS IVPB (mbp) -  Dose: 5 Million Units ; Route: Intravenous ; Line: Peripheral IV 10/02/18 1249 Left Forearm ; Scheduled Time: 1345  Michaelle Portillo RN   13:46 Other Flowsheet Documentation Other flowsheet entries   BP: 132/65 (Device Time: 13:45:48) Heart Rate: 77 (Device Time: 13:45:48)   Resp: 18     Michaelle Portillo RN   13:45:27 Orders Discontinued POC Glucose Q2H ; POC Glucose Q2H ; POC Glucose Q2H ; POC Glucose Q2H ; POC Glucose Q2H ; POC Glucose Q2H ; POC Glucose Q2H ; POC Glucose Q2H ; POC Glucose Q2H ; POC Glucose Q2H ; POC Glucose Q2H ; POC Glucose Q2H ; POC Glucose Q2H  Michaelle Portillo RN   13:30 Other Flowsheet Documentation Other flowsheet entries   Contraction Frequency (Minutes): 3-5     Michaelle Portillo RN   13:04:50 Orders Placed Manual Differential  Xiomara Rees MD   13:01 Other Flowsheet Documentation Other flowsheet entries   Contraction Frequency (Minutes): 4-5     Michaelle Portillo RN   13:00 Other Flowsheet Documentation Other flowsheet entries   Temp: 98.1 °F (36.7 °C) Temp src: Temporal Art   SpO2: 100 %     Michaelle Portillo RN   12:59 Specimens Collected Type & Screen  - ID: [id number] Type: Blood   CBC Auto Differential  - ID: [id number] Type: Blood   Manual Differential  - ID: [id number] Type: Blood  Vianey Rothman RN   12:58:13 Orders Placed CBC Auto Differential  Xiomraa Rees MD   12:58:05 Orders  Placed ondansetron (ZOFRAN) injection 4 mg ; Oxytocin-Sodium Chloride (PITOCIN) 30-0.9 UT/500ML-% infusion solution ; terbutaline (BRETHINE) injection 0.25 mg ; Sod Citrate-Citric Acid (BICITRA) solution 30 mL  Xiomara Rees MD   12:58:04 Orders Placed VTE Prophylaxis Not Indicated: No Risk Factors (0); </= 3 (Low Risk) ; penicillin g 5 mu/100 mL 0.9% NS IVPB (mbp) ; penicillin G in iso-osmotic dextrose IVPB 3 million units (premix) ; butorphanol (STADOL) injection 1 mg ; butorphanol (STADOL) injection 2 mg ; ondansetron (ZOFRAN) tablet 4 mg ; ondansetron ODT (ZOFRAN-ODT) disintegrating tablet 4 mg  Xiomara Rees MD   12:58:03 Orders Placed Mini- prep prior to delivery ; Monitor fetal heart tones unless patient requests intermittent ; External uterine contraction monitoring ; Notify Physician (specified) ; Notify physician for tachysystole (per hospital algorithm) ; Notify physician if membranes ruptured, bleeding greater than 1 pad an hour, fetal heart tone abnormality, and severe pain ; Oxygen Therapy- ; NPO Diet NPO Except: Ice chips ; CBC & Differential ; Type & Screen ; lidocaine PF 1% (XYLOCAINE) injection 5 mL ; Insert Peripheral IV ; Saline Lock & Maintain IV Access ; sodium chloride 0.9 % flush 3-10 mL ; sodium chloride 0.9 % flush 3 mL ; lactated ringers bolus 1,000 mL ; lactated ringers infusion  Xiomara Rees MD   12:58:02 Orders Placed Admit To Obstetrics Inpatient ; Code Status and Medical Interventions: ; Obtain informed consent ; Vital Signs Per hospital policy ; Bed Rest with Bathroom Privileges ; Weigh patient daily ; POC Glucose Q2H ; Intake and Output ; Cervical Exam ; Initiate Group Beta Strep (GBS) Prophylaxis Protocol, If Criteria Met ; Position change ; Confirm presence of amniotic fluid ; Keep exams to a minimum on patients with SROM  Xiomara Rees MD   12:50 Medication New Bag lactated ringers infusion -  Dose: 125 mL/hr ; Rate: 125 mL/hr ; Route:  Intravenous ; Line: Peripheral IV 10/02/18 1249 Left Forearm ; Scheduled Time: 1345  Michaelle Portillo RN   12:49 Peripheral IV 10/02/18 1249 Left Forearm Placed Placement Date/Time: 10/02/18 124   Hand Hygiene Completed: Yes  Size (Gauge): 18 G  Orientation: Left  Location: Forearm  Site Prep: Chlorhexidine  Local Anesthetic: None  Technique: Anatomical landmarks  Inserted by: KIMMIE Orosco  Total insertion ...  Vianey Rothman RN   12:09 Patient Admitted to L&D  Beckie Parker   12:07 Patient Arrived in L&D  Cassi Matt RN             Orders (last 72 hrs)     Start     Ordered    10/03/18 0251  PAMG-1, Rapid Assay For ROM - Amniotic Fluid, Amniotic Sac  Once      10/03/18 0250    10/03/18 0156  Tissue Pathology Exam  Once     Comments:  Gestational Age: 39w1d G1P0APGAR:1 Minute: 5 Minute:Maternal Complications: noneDelivery Complications:Maternal Medical History:Past Medical History:No date: AmenorrheaNo date: Benign neoplasm of pituitary gland (CMS/Formerly [facility] [place])No date: Galactorrhea not associated with childbirth    Comment:  Galactorrhea due to non-obstetric cause   No date: Hyperprolactinemia (CMS/Formerly [facility] [place])No date: HyperthyroidismNo date: Prolactinoma (CMS/Formerly [facility] [place])      10/03/18 0155    10/03/18 0155  methylergonovine (METHERGINE) injection 200 mcg  Once As Needed      10/03/18 0155    10/03/18 0155  carboprost (HEMABATE) injection 250 mcg  As Needed      10/03/18 0155    10/03/18 0155  misoprostol (CYTOTEC) tablet 800 mcg  As Needed      10/03/18 0155    10/03/18 0015  POC Protein, Urine, Qualitative, Dipstick  Once      10/03/18 0231    10/02/18 1800  penicillin G in iso-osmotic dextrose IVPB 3 million units (premix)  Every 4 Hours      10/02/18 1258    10/02/18 1515  Oxytocin-Lactated Ringers (PITOCIN) 20 UNIT/L in lactated Ringer's 1000 mL IVPB  As Needed      10/02/18 1428    10/02/18 1508  acetaminophen (TYLENOL) tablet 1,000 mg  Every 6 Hours PRN      10/02/18 1508    10/02/18 1428  Oxytocin-Lactated Ringers  (PITOCIN) 20 UNIT/L in lactated Ringer's 1000 mL IVPB  Once      10/02/18 1428    10/02/18 1400  POC Glucose Q2H  Every 2 Hours,   Status:  Canceled     Comments:  ONLY for diabetic patients      10/02/18 1258    10/02/18 1345  sodium chloride 0.9 % flush 3 mL  Every 12 Hours Scheduled      10/02/18 1258    10/02/18 1345  lactated ringers bolus 1,000 mL  Once      10/02/18 1258    10/02/18 1345  lactated ringers infusion  Continuous      10/02/18 1258    10/02/18 1345  penicillin g 5 mu/100 mL 0.9% NS IVPB (mbp)  Once      10/02/18 1258    10/02/18 1345  Oxytocin-Sodium Chloride (PITOCIN) 30-0.9 UT/500ML-% infusion solution  Titrated      10/02/18 1258    10/02/18 1305  Manual Differential  Once      10/02/18 1304    10/02/18 1300  Vital Signs Per hospital policy  Every Hour      10/02/18 1258    10/02/18 1256  Sod Citrate-Citric Acid (BICITRA) solution 30 mL  Once As Needed      10/02/18 1258    10/02/18 1256  terbutaline (BRETHINE) injection 0.25 mg  As Needed      10/02/18 1258    10/02/18 1255  ondansetron (ZOFRAN) tablet 4 mg  Every 6 Hours PRN      10/02/18 1258    10/02/18 1255  ondansetron ODT (ZOFRAN-ODT) disintegrating tablet 4 mg  Every 6 Hours PRN      10/02/18 1258    10/02/18 1255  ondansetron (ZOFRAN) injection 4 mg  Every 6 Hours PRN      10/02/18 1258    10/02/18 1255  butorphanol (STADOL) injection 2 mg  Every 3 Hours PRN      10/02/18 1258    10/02/18 1255  butorphanol (STADOL) injection 1 mg  Every 3 Hours PRN      10/02/18 1258    10/02/18 1255  Admit To Obstetrics Inpatient  Once      10/02/18 1258    10/02/18 1255  Code Status and Medical Interventions:  Continuous      10/02/18 1258    10/02/18 1255  Obtain informed consent  Once      10/02/18 1258    10/02/18 1255  Bed Rest with Bathroom Privileges  Once      10/02/18 1258    10/02/18 1255  Weigh patient daily  Until Discontinued      10/02/18 1258    10/02/18 1255  Intake and Output  Every Shift     Comments:  Every shift if on IV  Tocolytics.    10/02/18 1258    10/02/18 1255  Cervical Exam  Once     Comments:  Unless contraindicated, every 1-2 hours in active labor, or at nurse's discretion.    10/02/18 1258    10/02/18 1255  Initiate Group Beta Strep (GBS) Prophylaxis Protocol, If Criteria Met  Continuous     Comments:  NO TREATMENT RECOMMENDED IF: 1)  Maternal GBS status known negative 2)  Scheduled  birth with intact membranes, not in labor.  3 ) Maternal GBS unknown, no risk factors.   TREAT WITH ANTIBIOTICS IF:  1)  Maternal GBS status is known postive.  2)  Maternal GBS status unknown with these risk factors:  a)  Previous infant affected by GBS infection.  b)  GBS urinary tract infection (UTI) or bacteruria during pregnancy  c)  Unexplained maternal fever in labor (greater than or equal to 100.4F or 38.0C)  d)  Prolonged rupture of the membranes greater than or equal to 18 hours.  e)  Gestational age less than 37 weeks.    10/02/18 1258    10/02/18 1255  Confirm presence of amniotic fluid  Once     Comments:  If patient present with SROM    10/02/18 1258    10/02/18 1255  Keep exams to a minimum on patients with SROM  Until Discontinued      10/02/18 1258    10/02/18 1255  Mini- prep prior to delivery  Once      10/02/18 1258    10/02/18 1255  Monitor fetal heart tones unless patient requests intermittent  Until Discontinued      10/02/18 1258    10/02/18 1255  External uterine contraction monitoring  Per Hospital Policy      10/02/18 1258    10/02/18 1255  Notify Physician (specified)  Until Discontinued      10/02/18 1258    10/02/18 1255  Notify physician for tachysystole (per hospital algorithm)  Until Discontinued      10/02/18 1258    10/02/18 1255  Notify physician if membranes ruptured, bleeding greater than 1 pad an hour, fetal heart tone abnormality, and severe pain  Until Discontinued      10/02/18 1258    10/02/18 1255  Oxygen Therapy-  Continuous     Comments:  For intra-uterine resuscitation for hypertonus,  hypertstimulation, or non-reassuring fetal status    10/02/18 1258    10/02/18 1255  NPO Diet NPO Except: Ice chips  Diet Effective Now      10/02/18 1258    10/02/18 1255  CBC & Differential  Once      10/02/18 1258    10/02/18 1255  Type & Screen  Once      10/02/18 1258    10/02/18 1255  Insert Peripheral IV  Once      10/02/18 1258    10/02/18 1255  Saline Lock & Maintain IV Access  Continuous      10/02/18 1258    10/02/18 1255  VTE Prophylaxis Not Indicated: No Risk Factors (0); </= 3 (Low Risk)  Once      10/02/18 1258    10/02/18 1255  CBC Auto Differential  PROCEDURE ONCE      10/02/18 1258    10/02/18 1254  lidocaine PF 1% (XYLOCAINE) injection 5 mL  As Needed      10/02/18 1258    10/02/18 1254  sodium chloride 0.9 % flush 3-10 mL  As Needed      10/02/18 1258    10/02/18 0000  Group B Streptococcus Culture - Swab, Vaginal/Rectum     Comments:  This is an external result entered through the Results Console.      10/02/18 1332    10/02/18 0000  Antibody Screen     Comments:  This is an external result entered through the Results Console.      10/02/18 1332    10/02/18 0000  ABO / Rh     Comments:  This is an external result entered through the Results Console.      10/02/18 1332    10/02/18 0000  Hepatitis B Surface Antigen     Comments:  This is an external result entered through the Results Console.      10/02/18 1332    10/02/18 0000  RPR     Comments:  This is an external result entered through the Results Console.      10/02/18 1332    10/02/18 0000  Rubella Antibody, IgG     Comments:  This is an external result entered through the Results Console.      10/02/18 1332    10/02/18 0000  Hepatitis C Antibody     Comments:  This is an external result entered through the Results Console.      10/02/18 2154    Unscheduled  Position change  As Needed     Comments:  For intra-uterine resuscitation for hypertonus, hypertstimulation, or non-reassuring fetal status    10/02/18 1258    Signed and Held  Vital  Signs Per hospital policy  Per Hospital Policy      Signed and Held    Signed and Held  Up Ad Mary  Until Discontinued      Signed and Held    Signed and Held  Strict Intake and Output  Every Shift      Signed and Held    Signed and Held  Fundal and Lochia Check  Per Hospital Policy     Comments:  Q 15 min x 4, Q 30 min x 2, then Q Shift    Signed and Held    Signed and Held  Apply Ice to Perineum  As Needed      Signed and Held    Signed and Held  Bladder Assessment  As Needed      Signed and Held    Signed and Held  Indwelling Urinary Catheter  Once      Signed and Held    Signed and Held  Notify Physician (specified)  Until Discontinued      Signed and Held    Signed and Held  Diet Regular  Diet Effective Now      Signed and Held    Signed and Held  ibuprofen (ADVIL,MOTRIN) tablet 800 mg  Every 8 Hours PRN      Signed and Held    Signed and Held  ondansetron (ZOFRAN) tablet 4 mg  Every 6 Hours PRN      Signed and Held    Signed and Held  ondansetron ODT (ZOFRAN-ODT) disintegrating tablet 4 mg  Every 6 Hours PRN      Signed and Held    Signed and Held  ondansetron (ZOFRAN) injection 4 mg  Every 6 Hours PRN      Signed and Held    Signed and Held  promethazine (PHENERGAN) injection 12.5 mg  Every 6 Hours PRN      Signed and Held    Signed and Held  promethazine (PHENERGAN) injection 12.5 mg  Every 6 Hours PRN      Signed and Held    Signed and Held  promethazine (PHENERGAN) suppository 12.5 mg  Every 6 Hours PRN      Signed and Held    Signed and Held  promethazine (PHENERGAN) tablet 12.5 mg  Every 6 Hours PRN      Signed and Held    Signed and Held  oxyCODONE-acetaminophen (PERCOCET) 5-325 MG per tablet 1 tablet  Every 4 Hours PRN      Signed and Held    --  valACYclovir (VALTREX) 500 MG tablet  Daily      10/02/18 7579

## 2018-10-04 ENCOUNTER — ANESTHESIA EVENT (OUTPATIENT)
Dept: LABOR AND DELIVERY | Facility: HOSPITAL | Age: 23
End: 2018-10-04

## 2018-10-04 ENCOUNTER — ANESTHESIA (OUTPATIENT)
Dept: LABOR AND DELIVERY | Facility: HOSPITAL | Age: 23
End: 2018-10-04

## 2018-10-04 PROBLEM — Z34.90 PREGNANCY: Status: RESOLVED | Noted: 2018-09-06 | Resolved: 2018-10-04

## 2018-10-04 PROCEDURE — C1755 CATHETER, INTRASPINAL: HCPCS | Performed by: NURSE ANESTHETIST, CERTIFIED REGISTERED

## 2018-10-04 PROCEDURE — 25010000002 PENICILLIN G POTASSIUM PER 600000 UNITS: Performed by: OBSTETRICS & GYNECOLOGY

## 2018-10-04 PROCEDURE — 51703 INSERT BLADDER CATH COMPLEX: CPT

## 2018-10-04 PROCEDURE — 25010000002 BUTORPHANOL PER 1 MG: Performed by: OBSTETRICS & GYNECOLOGY

## 2018-10-04 PROCEDURE — 88307 TISSUE EXAM BY PATHOLOGIST: CPT | Performed by: OBSTETRICS & GYNECOLOGY

## 2018-10-04 PROCEDURE — 25010000002 ROPIVACAINE PER 1 MG: Performed by: NURSE ANESTHETIST, CERTIFIED REGISTERED

## 2018-10-04 RX ORDER — PROMETHAZINE HYDROCHLORIDE 12.5 MG/1
12.5 SUPPOSITORY RECTAL EVERY 6 HOURS PRN
Status: DISCONTINUED | OUTPATIENT
Start: 2018-10-04 | End: 2018-10-04 | Stop reason: HOSPADM

## 2018-10-04 RX ORDER — IBUPROFEN 800 MG/1
800 TABLET ORAL EVERY 8 HOURS PRN
Status: DISCONTINUED | OUTPATIENT
Start: 2018-10-04 | End: 2018-10-06 | Stop reason: HOSPADM

## 2018-10-04 RX ORDER — OXYCODONE HYDROCHLORIDE AND ACETAMINOPHEN 5; 325 MG/1; MG/1
1 TABLET ORAL EVERY 4 HOURS PRN
Status: DISCONTINUED | OUTPATIENT
Start: 2018-10-04 | End: 2018-10-04 | Stop reason: HOSPADM

## 2018-10-04 RX ORDER — PROMETHAZINE HYDROCHLORIDE 25 MG/1
12.5 TABLET ORAL EVERY 6 HOURS PRN
Status: DISCONTINUED | OUTPATIENT
Start: 2018-10-04 | End: 2018-10-04 | Stop reason: HOSPADM

## 2018-10-04 RX ORDER — EPHEDRINE SULFATE 50 MG/ML
5 INJECTION, SOLUTION INTRAVENOUS
Status: DISCONTINUED | OUTPATIENT
Start: 2018-10-04 | End: 2018-10-04 | Stop reason: HOSPADM

## 2018-10-04 RX ORDER — LIDOCAINE HYDROCHLORIDE AND EPINEPHRINE 15; 5 MG/ML; UG/ML
INJECTION, SOLUTION EPIDURAL AS NEEDED
Status: DISCONTINUED | OUTPATIENT
Start: 2018-10-04 | End: 2018-10-05 | Stop reason: SURG

## 2018-10-04 RX ORDER — ONDANSETRON 2 MG/ML
4 INJECTION INTRAMUSCULAR; INTRAVENOUS EVERY 6 HOURS PRN
Status: DISCONTINUED | OUTPATIENT
Start: 2018-10-04 | End: 2018-10-04 | Stop reason: HOSPADM

## 2018-10-04 RX ORDER — PROMETHAZINE HYDROCHLORIDE 25 MG/ML
12.5 INJECTION, SOLUTION INTRAMUSCULAR; INTRAVENOUS EVERY 6 HOURS PRN
Status: DISCONTINUED | OUTPATIENT
Start: 2018-10-04 | End: 2018-10-04 | Stop reason: HOSPADM

## 2018-10-04 RX ORDER — ROPIVACAINE HYDROCHLORIDE 5 MG/ML
INJECTION, SOLUTION EPIDURAL; INFILTRATION; PERINEURAL AS NEEDED
Status: DISCONTINUED | OUTPATIENT
Start: 2018-10-04 | End: 2018-10-05 | Stop reason: SURG

## 2018-10-04 RX ORDER — ONDANSETRON 4 MG/1
4 TABLET, FILM COATED ORAL EVERY 6 HOURS PRN
Status: DISCONTINUED | OUTPATIENT
Start: 2018-10-04 | End: 2018-10-04 | Stop reason: HOSPADM

## 2018-10-04 RX ORDER — ONDANSETRON 4 MG/1
4 TABLET, ORALLY DISINTEGRATING ORAL EVERY 6 HOURS PRN
Status: DISCONTINUED | OUTPATIENT
Start: 2018-10-04 | End: 2018-10-04 | Stop reason: HOSPADM

## 2018-10-04 RX ORDER — IBUPROFEN 800 MG/1
800 TABLET ORAL EVERY 8 HOURS PRN
Status: DISCONTINUED | OUTPATIENT
Start: 2018-10-04 | End: 2018-10-04 | Stop reason: HOSPADM

## 2018-10-04 RX ADMIN — OXYTOCIN 999 ML/HR: 10 INJECTION INTRAVENOUS at 16:33

## 2018-10-04 RX ADMIN — SODIUM CHLORIDE, POTASSIUM CHLORIDE, SODIUM LACTATE AND CALCIUM CHLORIDE 125 ML/HR: 600; 310; 30; 20 INJECTION, SOLUTION INTRAVENOUS at 04:41

## 2018-10-04 RX ADMIN — SODIUM CHLORIDE, POTASSIUM CHLORIDE, SODIUM LACTATE AND CALCIUM CHLORIDE 125 ML/HR: 600; 310; 30; 20 INJECTION, SOLUTION INTRAVENOUS at 12:44

## 2018-10-04 RX ADMIN — ROPIVACAINE HYDROCHLORIDE 8 ML/HR: 2 INJECTION, SOLUTION EPIDURAL; INFILTRATION at 10:37

## 2018-10-04 RX ADMIN — BUTORPHANOL TARTRATE 1 MG: 1 INJECTION, SOLUTION INTRAMUSCULAR; INTRAVENOUS at 02:15

## 2018-10-04 RX ADMIN — PENICILLIN G 3 MILLION UNITS: 3000000 INJECTION, SOLUTION INTRAVENOUS at 00:30

## 2018-10-04 RX ADMIN — LIDOCAINE HYDROCHLORIDE AND EPINEPHRINE 3 ML: 15; 5 INJECTION, SOLUTION EPIDURAL at 09:08

## 2018-10-04 RX ADMIN — PENICILLIN G 3 MILLION UNITS: 3000000 INJECTION, SOLUTION INTRAVENOUS at 12:46

## 2018-10-04 RX ADMIN — PENICILLIN G 3 MILLION UNITS: 3000000 INJECTION, SOLUTION INTRAVENOUS at 04:45

## 2018-10-04 RX ADMIN — PENICILLIN G 3 MILLION UNITS: 3000000 INJECTION, SOLUTION INTRAVENOUS at 08:48

## 2018-10-04 RX ADMIN — ROPIVACAINE HYDROCHLORIDE 8 ML: 5 INJECTION, SOLUTION EPIDURAL; INFILTRATION; PERINEURAL at 09:13

## 2018-10-04 RX ADMIN — IBUPROFEN 800 MG: 800 TABLET ORAL at 17:04

## 2018-10-04 NOTE — PROGRESS NOTES
Eran Villarealport  : 1995  MRN: [id number]  CSN: [id number]    Labor progress note    Subjective   She reports no problems.  However, on ultrasound today, pt was noted to have a hematoma in the umbilical cord.  Pregnancy additionally complicated by fetal gallstones and IUGR.     Objective   Min/max vitals past 24 hours:  Temp  Min: 97.5 °F (36.4 °C)  Max: 99.3 °F (37.4 °C)   BP  Min: 117/97  Max: 180/90   Pulse  Min: 54  Max: 87   Resp  Min: 16  Max: 20        FHT's: reactive and category 1.  external monitors used   Cervix: was not checked.   Contractions: irregular      Assessment   1. IUP at 39w3d  2. Cord hematoma  3. IUGR -resolved  4. Fetal gallstones     Plan   1. Continue with induction    Xiomara Rees MD  10/4/2018  4:44 PM

## 2018-10-04 NOTE — PAYOR COMM NOTE
"[facility]  [place],   [phone]  OR  FAX  [phone]    REF:[id number]  UNDELIVERED AT THIS TIME     Shira Adler  (22 y.o. Female)     Date of Birth Social Security Number Address Home Phone MRN    1995  [address] [facility] [address] [phone] [id number]    [employer] Marital Status          None Single       Admission Date Admission Type Admitting Provider Attending Provider Department, Room/Bed    10/2/18 Elective Xiomara Rees MD Mueller, Susan Kathleen, MD [facility] LABOR DELIVERY, L6/1    Discharge Date Discharge Disposition Discharge Destination                       Attending Provider:  Xiomara Rees MD    Allergies:  No Known Allergies    Isolation:  None   Infection:  None   Code Status:  CPR    Ht:  161.5 cm (63.6\")   Wt:  102 kg (224 lb)    Admission Cmt:  None   Principal Problem:  None                Active Insurance as of 10/2/2018     Primary Coverage     Payor Plan Insurance Group Employer/Plan Group    [employer]HE[employer] MEDICAID ANTH MEDICAID [id number]     Payor Plan Address Payor Plan Phone Number Effective From Effective To    [address] [address] [phone] 6/1/2016     [facility] [id number]       Subscriber Name Subscriber Birth Date Member ID       SHIRA ADLER 1995 [id number]                 Emergency Contacts      (Rel.) Home Phone Work Phone Mobile Phone    Michelle Zarco (Mother) -- -- [phone]    Ayo Ricks (Other) [phone] -- --        10/4/2018 Event Details User   07:30 Other Flowsheet Documentation Other flowsheet entries   Temp: 98.2 °F (36.8 °C) Temp src: Temporal Art   Resp: 20     Brenda Cordova RN   07:00 Other Flowsheet Documentation Other flowsheet entries   BP: 180/90 (Device Time: 07:01:48) Heart Rate: 84 (Device Time: 07:01:48)    Brenda Cordova RN   05:00 Other Flowsheet Documentation Other flowsheet entries   BP: 141/69 (Device Time: 05:01:17) Heart " Rate: 63 (Device Time: 05:01:17)    Brenda Cordova RN   04:45 Medication New Bag penicillin G in iso-osmotic dextrose IVPB 3 million units (premix) -  Dose: 3 Million Units ; Rate: 100 mL/hr ; Route: Intravenous ; Line: Peripheral IV 10/02/18 1249 Left Forearm ; Scheduled Time: 0430  Zayda Arizmendi RN   04:41 Medication New Bag lactated ringers infusion -  Dose: 125 mL/hr ; Rate: 125 mL/hr ; Route: Intravenous ; Line: Peripheral IV 10/02/18 1249 Left Forearm ; Scheduled Time: 0441  Zayda Arizmendi RN   04:40 Medication Stopped lactated ringers infusion -  Route: Intravenous ; Line: Peripheral IV 10/02/18 1249 Left Forearm ; Scheduled Time: 0440  Zayda Arizmendi RN   03:00 Other Flowsheet Documentation Other flowsheet entries   BP: 125/71 (Device Time: 03:01:19) Heart Rate: 71 (Device Time: 03:01:19)    Brenda Cordova RN   02:30 Other Flowsheet Documentation Other flowsheet entries   BP: 127/64 (Device Time: 02:31:11) Heart Rate: 62 (Device Time: 02:31:11)   Resp: 18     Linsey Null RN   02:15 Other Flowsheet Documentation Other flowsheet entries   Pain Rating (0-10): Rest: 8     Linsey Null RN   02:15 Medication Given butorphanol (STADOL) injection 1 mg -  Dose: 1 mg ; Route: Intravenous ; Line: Peripheral IV 10/02/18 1249 Left Forearm  Linsey Null RN   02:00 Other Flowsheet Documentation Uterine Activity Assessment   Contraction Frequency (Minutes): 2-4    Other flowsheet entries   Pain Rating (0-10): Activity: 8 Pain Rating (0-10): Rest: 8    Linsey Null RN   01:30 Other Flowsheet Documentation Uterine Activity Assessment   Contraction Frequency (Minutes): 2-7     Linsey Null RN   01:00 Other Flowsheet Documentation Uterine Activity Assessment   Contraction Frequency (Minutes): 2-4    Other flowsheet entries   BP: 164/85 (Device Time: 01:01:46) Heart Rate: 64 (Device Time: 01:01:46)   Pain Rating (0-10): Activity: 7 Pain Rating (0-10): Rest: 7    Linsey Null RN   00:30 Other  Flowsheet Documentation Uterine Activity Assessment   Contraction Frequency (Minutes): 3-5    Other flowsheet entries   BP: 164/80 (Device Time: 00:31:41) Temp: 98.2 °F (36.8 °C)   Temp src: Oral Heart Rate: 54 (Device Time: 00:31:41)   Resp: 18     Linsey Null RN   00:30 Medication New Bag penicillin G in iso-osmotic dextrose IVPB 3 million units (premix) -  Dose: 3 Million Units ; Route: Intravenous ; Line: Peripheral IV 10/02/18 1249 Left Forearm ; Scheduled Time: 0030  Linsey Null RN   00:20 Medication Not Given sodium chloride 0.9 % flush 3 mL -  Dose: 3 mL ; Route: Intravenous ; Reason: Order parameters not met ; Scheduled Date: 10/3/18;  ; Scheduled Time: 2100 ; Comment: IV INFUSING  Linsey Null RN   00:00 Other Flowsheet Documentation Uterine Activity Assessment   Contraction Frequency (Minutes): 3-5    Other flowsheet entries   BP: 170/96 (Device Time: 00:01:20) Heart Rate: 72 (Device Time: 00:01:20)   Pain Rating (0-10): Activity: 7 Pain Rating (0-10): Rest: 7    Linsey Null RN   00:00 Medication Rate/Dose Change Oxytocin-Sodium Chloride (PITOCIN) 30-0.9 UT/500ML-% infusion solution -  Dose: 20 cali-units/min ; Rate: 20 mL/hr ; Route: Intravenous ; Line: Peripheral IV 10/02/18 1249 Left Forearm ; Scheduled Time: 0000  Linsey Null RN              Today 0000 - Today 0459              24hr 8hr 4hr 1hr  [facility] LABOR DELIVERY    10/03 0700 - 10/04 0659    1hr:  00-01 01-02 02-03 03-04 04-05        Maternal Vitals    BP  164/80 164/85 127/64 125/71 117/97  BP     Temp  98.2 (36.8)      Temp     Heart Rate  54 64 62 71 85  Heart Rate     Resp  18  18    Resp     FHR    Fetal Movement  active active active    Fetal Movement     Fetal HR Assessment Method  external external external    Fetal HR Assessment Method     Fetal HR (beats/min)  135 135 140    Fetal HR (beats/min)     Fetal Heart Baseline Rate  normal range normal range normal range    Fetal Heart Baseline Rate      Fetal HR Variability  moderate (amplitude r... moderate (amplitude r... moderate (amplitude r...    Fetal HR Variability     Fetal HR Accelerations  greater than/equal to... greater than/equal to... greater than/equal to...    Fetal HR Accelerations     Fetal HR Decelerations  absent absent absent    Fetal HR Decelerations     Fetal HR Tracing Category  Category I Category I Category I    Fetal HR Tracing Category     Sinusoidal Pattern Present  absent absent absent    Sinusoidal Pattern Present     Uterine Activity    Method  per patient report;ex... per patient report;ex... per patient report;ex...    Method     Contraction Frequency (Minutes)  3-5 2-7 2-4    Contraction Frequency (Minutes)     Contraction Intensity  mild by palpation mild by palpation mild by palpation    Contraction Intensity     Uterine Resting Tone  soft by palpation soft by palpation soft by palpation    Uterine Resting Tone     Oxytocin Drip    Dose  20 cali-units/min ?20 cali-units/min ?20 cali-units/min ?20 cali-units/min ?20 cali-units/min  Dose     Rate  20 mL/hr ?20 mL/hr ?20 mL/hr ?20 mL/hr ?20 mL/hr  Rate     Cervical Exam    Cervical Dilation (cm)    4    Cervical Dilation (cm)     Cervical Effacement    70%    Cervical Effacement     Fetal Station    -2    Fetal Station                   ICU Vital Signs     Row Name 10/04/18 0730 10/04/18 0700 10/04/18 0500 10/04/18 0300 10/04/18 0230       Vitals    Temp 98.2 °F (36.8 °C)  --  --  --  --    Temp src Temporal Artery   --  --  --  --    Pulse  -- 84 63 71 62    Heart Rate Source Monitor  --  --  -- Monitor    Resp 20  --  --  -- 18    Resp Rate Source  --  --  --  -- Visual    BP  -- 180/90 141/69 125/71 127/64    BP Location Right arm  --  --  -- Right arm    BP Method Automatic  --  --  -- Automatic    Patient Position Lying  --  --  -- Sitting       Oxygen Therapy    Device (Oxygen Therapy)  --  --  --  -- room air    Row Name 10/04/18 0100 10/04/18 0030 10/04/18 0000  10/03/18 2330 10/03/18 2300       Vitals    Temp  -- 98.2 °F (36.8 °C)  --  -- 98.6 °F (37 °C)    Temp src  -- Oral  --  -- Oral    Pulse 64 54 72 62 67    Heart Rate Source  -- Monitor  --  -- Monitor    Resp  -- 18  --  -- 18    Resp Rate Source  -- Visual  --  -- Visual    /85 164/80 170/96 146/72 (!)  162/101   PT ARM WAS BENT    BP Location  -- Right arm  --  -- Right arm    BP Method  -- Automatic  --  -- Automatic    Patient Position  -- Sitting  --  -- Sitting       Oxygen Therapy    Device (Oxygen Therapy)  -- room air  --  -- room air    Row Name 10/03/18 2230 10/03/18 2200 10/03/18 2130 10/03/18 2100 10/03/18 2000       Vitals    Temp  --  -- 98.8 °F (37.1 °C)  --  --    Temp src  --  -- Oral  --  --    Pulse 60 78 84 72 80    Heart Rate Source  --  -- Monitor  --  --    Resp  --  -- 18  --  --    Resp Rate Source  --  -- Visual  --  --    /89 143/79 157/84 156/88 167/97    BP Location  --  -- Right arm  --  --    BP Method  --  -- Automatic  --  --    Patient Position  --  -- Lying  --  --       Oxygen Therapy    Device (Oxygen Therapy)  --  -- room air  --  --    Row Name 10/03/18 1930 10/03/18 1900 10/03/18 1800 10/03/18 1747 10/03/18 1700       Vitals    Temp 98.6 °F (37 °C)  --  -- 98.5 °F (36.9 °C)  --    Temp src Oral  --  -- Temporal Artery   --    Pulse 64 76 82  -- 85    Heart Rate Source Monitor  --  --  --  --    Resp 18  --  --  --  --    Resp Rate Source Visual  --  --  --  --    /84 149/82 148/79  -- 130/78    BP Location Right arm  --  --  --  --    BP Method Automatic  --  --  --  --    Patient Position Lying  --  --  --  --       Oxygen Therapy    Device (Oxygen Therapy) room air  --  --  --  --    Row Name 10/03/18 1600 10/03/18 1500 10/03/18 1402 10/03/18 1332 10/03/18 1313       Vitals    Temp  --  --  --  -- 97.8 °F (36.6 °C)    Pulse 80 63 72 92  --    /69 143/77 134/71 161/98  --    Row Name 10/03/18 1301 10/03/18 1246 10/03/18 1202 10/03/18 1132 10/03/18  1115       Vitals    Temp  --  --  --  -- 98.3 °F (36.8 °C)    Temp src  --  --  --  -- Oral    Pulse 77 86 75 78  --    /85 150/74 157/74 152/87  --    Row Name 10/03/18 1102 10/03/18 1031 10/03/18 1001 10/03/18 0940 10/03/18 0913       Vitals    Temp  --  --  --  -- 97.5 °F (36.4 °C)    Temp src  --  --  --  -- Temporal Artery     Pulse 71 79 79 83 92    /81 148/96 146/80 130/63 133/95    Row Name 10/03/18 0843 10/03/18 0831                Vitals    Pulse 75 76       /86 160/96

## 2018-10-04 NOTE — PAYOR COMM NOTE
"[facility]  [facility]   [phone]  Or   Fax  [phone]    Ref:[id number]. Pt remains undelivered     Shira Adler  (22 y.o. Female)     Date of Birth Social Security Number Address Home Phone MRN    1995  [address] [facility] [facility] [address] [phone] [id number]    Jain Marital Status          None Single       Admission Date Admission Type Admitting Provider Attending Provider Department, Room/Bed    10/2/18 Elective Xiomara Rees MD Mueller, Susan Kathleen, MD [facility] LABOR DELIVERY, L6/1    Discharge Date Discharge Disposition Discharge Destination                       Attending Provider:  Xiomara Rees MD    Allergies:  No Known Allergies    Isolation:  None   Infection:  None   Code Status:  CPR    Ht:  161.5 cm (63.6\")   Wt:  102 kg (224 lb)    Admission Cmt:  None   Principal Problem:  None                Active Insurance as of 10/2/2018     Primary Coverage     Payor Plan Insurance Group Employer/Plan Group    [place] MED[place] [facility] MEDICAID [id number]     Payor Plan Address Payor Plan Phone Number Effective From Effective To    [address] [address] [phone] 6/1/2016     [facility] [id number]       Subscriber Name Subscriber Birth Date Member ID       SHIRA ADLER 1995 [id number]                 Emergency Contacts      (Rel.) Home Phone Work Phone Mobile Phone    Michelle Zarco (Mother) -- -- [phone]    Ayo Ricks (Other) [phone] -- --        Michaelle Portillo RN Registered Nurse Signed Obstetrics  Plan of Care Date of Service: 10/2/2018  5:01 PM         Problem: Patient Care Overview  Goal: Plan of Care Review  Outcome: Ongoing (interventions implemented as appropriate)  Pt has been on Pitocin since early afternoon and has not had any significant discomfort with contractions.  Pt had a mild headache that was relieved with Tylenol.  There is a hematoma that was visualized on U/S " in the cord.  Fetal tracing has been reassuring since arrival.    10/02/18 1647   Coping/Psychosocial   Plan of Care Reviewed With patient          EDC 10/8/18  G-1 P-0  39/3 GESTATIONAL AGE    10/2/18   PITOCIN GGT STARTED  HINA 1.5-5 MINUTES  DILATION 2-3     10/3/18 DILATION 2-3  EFFACEMENT 70% FETAL STATION -2 PITOCIN GGT     10/3/18 AT 0237 SROM   DILATION 2-3  70% EFFACEMENT -2 STATION  PITOCIN GGT  AT 1300  DILATION 2-3  70% EFFACEMENT  -2 STATION .  PITOCIN GGT  HINA 1-3 MINUTES     10/4/18 DILATION AT 4  70% EFFACEMENT -2 STATION   AT 0900  DILATION 5  100% CERVICAL EFFACEMENT -1 STATION. AT 1500  DILATION 6  100% EFFACEMENT -1 STATION     HINA 1-3 MINUTES     PITOCIN GGT CONTINUES  Labor & Delivery Timeline (9/6/2018 10:58 to 10/4/2018 15:06:11)     10/4/2018 Event Details User   14:20 Other Flowsheet Documentation Other flowsheet entries   Response: No new orders (Call MD in one hour with progress) Provider Name: Dr Rees   Notification Route: Phone call     Vianey Rothman RN   14:17 Other Flowsheet Documentation Other flowsheet entries   BP: 146/72 (Device Time: 14:16:32) Heart Rate: 63 (Device Time: 14:16:32)   Resp: 18     Vianey Rothman RN   14:02 Other Flowsheet Documentation Other flowsheet entries   BP: 172/99 (Device Time: 14:01:31) Heart Rate: 74 (Device Time: 14:01:31)    Vianey Rothman RN   14:00 Medication Rate/Dose Change Oxytocin-Sodium Chloride (PITOCIN) 30-0.9 UT/500ML-% infusion solution -  Dose: 22 cali-units/min ; Rate: 22 mL/hr ; Route: Intravenous ; Line: Peripheral IV 10/02/18 1249 Left Forearm ; Scheduled Time: 1400  Vianey Rothman RN   13:47 Other Flowsheet Documentation Other flowsheet entries   BP: 161/83 (Device Time: 13:46:52) Heart Rate: 66 (Device Time: 13:46:52)    Vianey Rothman RN   13:17 Other Flowsheet Documentation Other flowsheet entries   BP: 175/95 (Device Time: 13:16:53) Heart Rate: 59 (Device Time: 13:16:53)    Vianey Rothman  KIMMIE LOCKHART   13:02 Other Flowsheet Documentation Other flowsheet entries   BP: 167/75 (Device Time: 13:01:41) Heart Rate: 63 (Device Time: 13:01:41)   Resp: 18     Vianey Rothman RN   13:00 Other Flowsheet Documentation Other flowsheet entries   Response: See orders Provider Name: Dr Rees   Notification Route: Phone call     Vianey Rothman RN   12:47 Other Flowsheet Documentation Other flowsheet entries   BP: 158/74 (Device Time: 12:46:45) Heart Rate: 56 (Device Time: 12:46:45)    Vianey Rothman RN   12:46 Medication New Bag penicillin G in iso-osmotic dextrose IVPB 3 million units (premix) -  Dose: 3 Million Units ; Route: Intravenous ; Line: Peripheral IV 10/02/18 1249 Left Forearm ; Scheduled Time: 1230  Vianey Rothman RN   12:44 Other Flowsheet Documentation Other flowsheet entries   Labor Interventions: TOCO adjusted     Stephan, Vianey R, RN   12:44 Medication New Bag lactated ringers infusion -  Dose: 125 mL/hr ; Rate: 125 mL/hr ; Route: Intravenous ; Line: Peripheral IV 10/02/18 1249 Left Forearm ; Scheduled Time: 1244  Vianey Rothman RN   12:43 Medication Stopped lactated ringers infusion -  Route: Intravenous ; Line: Peripheral IV 10/02/18 1249 Left Forearm ; Scheduled Time: 1243  Vianey Rothman RN   12:32 Other Flowsheet Documentation Other flowsheet entries   BP: 158/74 (Device Time: 12:31:33) Heart Rate: 65 (Device Time: 12:31:33)    Vianey Rothman RN   12:30 Other Flowsheet Documentation Uterine Activity Assessment   Contraction Frequency (Minutes): 1-3     Vianey Rothman RN   12:23 Medication Hold sodium chloride 0.9 % flush 3 mL -  Dose: 0 mL ; Route: Intravenous ; Line: Peripheral IV 10/02/18 1249 Left Forearm ; Reason: Order parameters not met ; Scheduled Time: 0900  Vianey Rothman RN   12:17 Other Flowsheet Documentation Other flowsheet entries   BP: 161/77 (Device Time: 12:16:30) Heart Rate: 61 (Device Time: 12:16:30)   Resp: 18     Vianey Rothman RN   12:02 Other  Flowsheet Documentation Other flowsheet entries   BP: 163/93 (Device Time: 12:01:42) Heart Rate: 73 (Device Time: 12:01:42)   Resp: 16     Vianey Rothman RN   11:47 Other Flowsheet Documentation Other flowsheet entries   BP: 146/79 (Device Time: 11:46:39) Heart Rate: 75 (Device Time: 11:46:39)   Resp: 16     Vianey Rothman RN   11:37 Other Flowsheet Documentation Other flowsheet entries   BP: 161/78 (Device Time: 11:37:23) Temp: 99.3 °F (37.4 °C)   Heart Rate: 72 (Device Time: 11:37:23) Resp: 16    Vianey Rothman RN   11:36 Other Flowsheet Documentation Other flowsheet entries   Heart Rate: 79 (Device Time: 11:35:33) SpO2: 99 % (Device Time: 11:35:33)    Vianey Rothman RN   11:31 Other Flowsheet Documentation Other flowsheet entries   BP:  152/102 (Device Time: 11:31:26) Heart Rate: 73 (Device Time: 11:30:34)   SpO2: 99 % (Device Time: 11:30:34)     Vianey Rothman RN   11:26 Other Flowsheet Documentation Other flowsheet entries   Heart Rate: 82 (Device Time: 11:25:34) SpO2: 97 % (Device Time: 11:25:34)    Vianey Rothman RN   11:21 Other Flowsheet Documentation Other flowsheet entries   Heart Rate: 79 (Device Time: 11:20:33) SpO2: 98 % (Device Time: 11:20:33)    Vianey Rothman RN   11:20:10 Orders Placed Vital Signs Per Anesthesia Guidelines ; Start IV with #16 or #18 gauge angiocath. ; Cardiac Monitoring ; Fetal Heart Rate Monitor ; Nurse or anesthesiologist to remain with patient for 15 minutes following dosing. ; Facilitate maternal postion on side and maintain uterine displacement. ; Consult anesthesia services prior to changing epidural infusion/rate. ; Notify physician for the following conditions: ; ePHEDrine injection 5 mg  GERARD Obrien CRNA   11:17 Other Flowsheet Documentation Other flowsheet entries   BP: 168/81 (Device Time: 11:16:31) Heart Rate: 81 (Device Time: 11:16:31)   Resp: 18     Vianey Rothman RN   11:16 Other Flowsheet Documentation Other flowsheet entries   Heart  Rate: 82 (Device Time: 11:15:33) SpO2: 97 % (Device Time: 11:15:33)    Vianey Rothman RN   11:11 Other Flowsheet Documentation Other flowsheet entries   Heart Rate: 77 (Device Time: 11:10:34) SpO2: 98 % (Device Time: 11:10:34)    Vianey Rothman RN   11:06 Other Flowsheet Documentation Other flowsheet entries   Heart Rate: 75 (Device Time: 11:05:34) SpO2: 98 % (Device Time: 11:05:34)    Vianey Rothman RN   11:02 Other Flowsheet Documentation Other flowsheet entries   BP: 178/83 (Device Time: 11:01:32) Heart Rate: 71 (Device Time: 11:01:32)   Resp: 18     Vianey Rothman RN   11:01 Other Flowsheet Documentation Uterine Activity Assessment   Contraction Frequency (Minutes): 1-3    Other flowsheet entries   Heart Rate: 80 (Device Time: 11:00:33) SpO2: 98 % (Device Time: 11:00:33)    Vianey Rothman RN   10:56 Other Flowsheet Documentation Other flowsheet entries   Heart Rate: 73 (Device Time: 10:55:33) SpO2: 99 % (Device Time: 10:55:33)    Vianey Rothman RN   10:55 Other Flowsheet Documentation Other flowsheet entries   Response to Labor Interventions: fetal HR return to baseline Labor Interventions: EFM adjusted    Shannon Werner RN   10:51 Other Flowsheet Documentation Other flowsheet entries   Heart Rate: 75 (Device Time: 10:50:34) SpO2: 99 % (Device Time: 10:50:34)    Vianey Rothman RN   10:47 Other Flowsheet Documentation Other flowsheet entries   BP: 169/94 (Device Time: 10:46:31) Heart Rate: 68 (Device Time: 10:46:31)   Resp: 18     Vianey Rothman RN   10:46 Other Flowsheet Documentation Other flowsheet entries   Heart Rate: 70 (Device Time: 10:45:33) SpO2: 99 % (Device Time: 10:45:33)    Vianey Rothman RN   10:41 Other Flowsheet Documentation Other flowsheet entries   Heart Rate: 72 (Device Time: 10:40:33) SpO2: 99 % (Device Time: 10:40:33)    Vianey Rothman RN   10:36 Other Flowsheet Documentation Other flowsheet entries   Heart Rate: 82 (Device Time: 10:35:33) SpO2: 99 % (Device  Time: 10:35:33)    Vianey Rothman RN   10:32 Other Flowsheet Documentation Other flowsheet entries   BP: 157/80 (Device Time: 10:31:38) Heart Rate: 61 (Device Time: 10:31:38)   Resp: 18     Vianey Rothman RN   10:31 Other Flowsheet Documentation Uterine Activity Assessment   Contraction Frequency (Minutes): 1-3    Other flowsheet entries   Heart Rate: 66 (Device Time: 10:30:34) SpO2: 99 % (Device Time: 10:30:34)    Vianey Rothman RN   10:26 Other Flowsheet Documentation Other flowsheet entries   Heart Rate: 77 (Device Time: 10:25:33) SpO2: 100 % (Device Time: 10:25:33)    Vianey Rothman RN   10:21 Other Flowsheet Documentation Other flowsheet entries   Heart Rate: 77 (Device Time: 10:20:33) SpO2: 100 % (Device Time: 10:20:33)    Vianey Rothman RN   10:16 Other Flowsheet Documentation Other flowsheet entries   BP:  158/102 (Device Time: 10:16:21) Heart Rate: 69 (Device Time: 10:15:33)   SpO2: 99 % (Device Time: 10:15:33)     Vianey Rothman RN   10:11 Other Flowsheet Documentation Other flowsheet entries   Heart Rate: 71 (Device Time: 10:10:33) SpO2: 99 % (Device Time: 10:10:33)    Vianey Rothman RN   10:06 Other Flowsheet Documentation Other flowsheet entries   Heart Rate: 70 (Device Time: 10:05:33) SpO2: 99 % (Device Time: 10:05:33)    Vianey Rothman RN   10:02 Other Flowsheet Documentation Other flowsheet entries   BP: 149/81 (Device Time: 10:01:33) Heart Rate: 74 (Device Time: 10:01:33)   Resp: 18     Vianey Rothman RN   10:01 Other Flowsheet Documentation Uterine Activity Assessment   Contraction Frequency (Minutes): 1-3    Other flowsheet entries   Heart Rate: 78 (Device Time: 10:00:33) SpO2: 100 % (Device Time: 10:00:33)    Vianey Rothman RN   09:56 Other Flowsheet Documentation Other flowsheet entries   Heart Rate: 77 (Device Time: 09:55:33) SpO2: 100 % (Device Time: 09:55:33)    Stephan, Vianey R, RN   09:51 Other Flowsheet Documentation Other flowsheet entries   Heart Rate: 71  (Device Time: 09:50:33) SpO2: 100 % (Device Time: 09:50:33)    Vianey Rothman RN   09:46 Other Flowsheet Documentation Other flowsheet entries   BP: 167/83 (Device Time: 09:46:23) Heart Rate: 82 (Device Time: 09:45:33)   SpO2: 100 % (Device Time: 09:45:33)     Vianey Rothman RN   09:44 Urethral Catheter Non-latex 16 Fr. Placed Placement Date/Time: 10/04/18 0944   Indications: Acute retention  Inserted by: WILMA Rothman RN  Hand Hygiene Completed: Yes  Catheter Type: Non-latex  Tube Size (Fr.): 16 Fr.  Catheter Balloon Size: 10 mL  Cleansed with: Perineal hygiene wipe  Urine Re...  Vianey Rothman RN   09:41 Other Flowsheet Documentation Other flowsheet entries   BP: 143/75 (Device Time: 09:41:00) Heart Rate: 68 (Device Time: 09:40:33)   SpO2: 99 % (Device Time: 09:40:33)     Vianey Rothman RN   09:40 Other Flowsheet Documentation Other flowsheet entries   BP: 147/64 (Device Time: 09:39:30) Heart Rate: 70 (Device Time: 09:39:30)    Vianey Rothman RN   09:37 Other Flowsheet Documentation Other flowsheet entries   BP: 152/75 (Device Time: 09:37:22) Heart Rate: 73 (Device Time: 09:37:22)    Vianey Rothman RN   09:36 Other Flowsheet Documentation Other flowsheet entries   Heart Rate: 65 (Device Time: 09:35:33) SpO2: 100 % (Device Time: 09:35:33)    Vianey Rothman RN   09:35 Other Flowsheet Documentation Other flowsheet entries   BP: 140/62 (Device Time: 09:35:27) Heart Rate: 64 (Device Time: 09:35:27)   Resp: 18     Vianey Rothman RN   09:33 Other Flowsheet Documentation Other flowsheet entries   BP: 146/68 (Device Time: 09:33:22) Heart Rate: 70 (Device Time: 09:33:22)    Vianey Rothman RN   09:32 Other Flowsheet Documentation Other flowsheet entries   BP: 142/62 (Device Time: 09:31:34) Heart Rate: 72 (Device Time: 09:31:34)    Vianey Rothman, RN   09:31 Other Flowsheet Documentation Uterine Activity Assessment   Contraction Frequency (Minutes): 2-3    Other flowsheet entries   Heart Rate: 82  (Device Time: 09:30:34) SpO2: 100 % (Device Time: 09:30:34)    Vianey Rothman RN   09:29 Other Flowsheet Documentation Other flowsheet entries   BP: 146/66 (Device Time: 09:29:21) Temp: 97.5 °F (36.4 °C)   Temp src: Oral Heart Rate: 79 (Device Time: 09:29:21)   Resp: 16     Vianey Rothman RN   09:28 Other Flowsheet Documentation Other flowsheet entries   BP: 151/63 (Device Time: 09:27:33) Heart Rate: 79 (Device Time: 09:27:33)    Vianey Rothman RN   09:26 Other Flowsheet Documentation Other flowsheet entries   Heart Rate: 86 (Device Time: 09:25:33) SpO2: 100 % (Device Time: 09:25:33)    Vianey Rothman RN   09:25 Other Flowsheet Documentation Other flowsheet entries   BP: 135/80 (Device Time: 09:25:15) Heart Rate: 86 (Device Time: 09:25:15)   Pain Rating (0-10): Activity: 0 Pain Rating (0-10): Rest: 0    Vianey Rothman RN   09:23 Other Flowsheet Documentation Other flowsheet entries   BP: 138/77 (Device Time: 09:23:18) Heart Rate: 87 (Device Time: 09:23:18)    Vianey Rothman RN   09:21 Other Flowsheet Documentation Other flowsheet entries   BP: 139/75 (Device Time: 09:21:13) Heart Rate: 84 (Device Time: 09:20:33)   SpO2: 100 % (Device Time: 09:20:33)     Vianey Rothman RN   09:19 Other Flowsheet Documentation Other flowsheet entries   BP: 137/75 (Device Time: 09:19:13) Heart Rate: 84 (Device Time: 09:19:13)    Vianey Rothman RN   09:17 Other Flowsheet Documentation Other flowsheet entries   BP: 130/75 (Device Time: 09:17:14) Heart Rate: 87 (Device Time: 09:17:14)   Resp: 18     Vianey Rothman RN   09:16 Other Flowsheet Documentation Other flowsheet entries   Heart Rate: 79 (Device Time: 09:15:34) SpO2: 100 % (Device Time: 09:15:34)    Stephan, Vianey R, RN   09:15 Other Flowsheet Documentation Other flowsheet entries   BP: 135/78 (Device Time: 09:15:17) Heart Rate: 80 (Device Time: 09:15:17)    Vianey Rothman RN   09:13 Other Flowsheet Documentation Other flowsheet entries   BP:  127/108  (Device Time: 09:13:26) Heart Rate: 80 (Device Time: 09:13:26)    Vianey Rothman RN   09:11 Other Flowsheet Documentation Other flowsheet entries   BP: 147/68 (Device Time: 09:11:17) Heart Rate: 79 (Device Time: 09:10:33)   Resp: 18 SpO2: 100 % (Device Time: 09:10:33)    Vianey Rothman RN   09:00 Other Flowsheet Documentation Uterine Activity Assessment   Contraction Frequency (Minutes): 1-3     Vianey Rothman RN   08:48 Medication New Bag penicillin G in iso-osmotic dextrose IVPB 3 million units (premix) -  Dose: 3 Million Units ; Route: Intravenous ; Line: Peripheral IV 10/02/18 1249 Left Forearm ; Scheduled Time: 0830  Vianey Rothman RN   08:35:22 Orders Placed Pt may have epidural  Misc Nursing Order (Specify)  Xiomara Rees MD   08:34 Other Flowsheet Documentation Other flowsheet entries   Response: See orders Provider Name: Dr. Rees   Notification Route: Phone call     Brenda Cordova RN   08:31 Other Flowsheet Documentation Uterine Activity Assessment   Contraction Frequency (Minutes): 2-5    Other flowsheet entries   BP: 156/80 (Device Time: 08:31:24) Heart Rate: 86 (Device Time: 08:31:24)    Vianey Rothman RN   08:20 Labor Onset Baby: Jose, Caleb  Vianey Rothman RN   08:01 Other Flowsheet Documentation Other flowsheet entries   BP: 150/84 (Device Time: 08:01:20) Heart Rate: 65 (Device Time: 08:01:20)    Brenda Cordova RN   08:00 Other Flowsheet Documentation Uterine Activity Assessment   Contraction Frequency (Minutes): 1-3    Other flowsheet entries   Pain Rating (0-10): Rest: 4 Coping (labor pain only): Able to relax between contractions    Cordova, Brenda L, RN   08:00 Peripheral IV 10/02/18 1249 Left Forearm Assessment Phlebitis: 0-->no symptoms Site Assessment: Clean; Dry; Intact   Dressing Status: Clean; Dry; Intact Line Status: Infusing   Dressing Type: Transparent     Brenda Cordova RN   07:31 Other Flowsheet Documentation Other flowsheet entries   BP:  140/71 (Device Time: 07:31:23) Heart Rate: 77 (Device Time: 07:31:23)    Brenda Cordova RN   07:30 Other Flowsheet Documentation Uterine Activity Assessment   Contraction Frequency (Minutes): 2-4    Other flowsheet entries   BP: 148/66 (Device Time: 07:29:30) Temp: 98.2 °F (36.8 °C)   Temp src: Temporal Art Heart Rate: 80 (Device Time: 07:29:30)   Resp: 20     Brenda Cordova RN   07:00 Other Flowsheet Documentation Uterine Activity Assessment   Contraction Frequency (Minutes): 2-4     Linsey Null RN   07:00 Other Flowsheet Documentation Other flowsheet entries   BP: 180/90 (Device Time: 07:01:48) Heart Rate: 84 (Device Time: 07:01:48)    Brenda Cordova RN   06:30 Other Flowsheet Documentation Uterine Activity Assessment   Contraction Frequency (Minutes): 2-4    Other flowsheet entries   Temp: 98.4 °F (36.9 °C) Temp src: Oral   Heart Rate: 73 (Device Time: 06:31:34)     Linsey Null RN   06:00 Other Flowsheet Documentation Uterine Activity Assessment   Contraction Frequency (Minutes): 2-3    Other flowsheet entries   BP: 158/79 (Device Time: 06:01:42) Heart Rate: 66 (Device Time: 06:01:42)   Pain Rating (0-10): Rest: 8     Linsey Null RN   05:30 Other Flowsheet Documentation Uterine Activity Assessment   Contraction Frequency (Minutes): 1-3     Linsey Null RN   05:00 Other Flowsheet Documentation Uterine Activity Assessment   Contraction Frequency (Minutes): IRREGULAR     Linsey Null RN   05:00 Other Flowsheet Documentation Other flowsheet entries   BP: 141/69 (Device Time: 05:01:17) Heart Rate: 63 (Device Time: 05:01:17)    Brenda Cordova RN   04:45 Medication New Bag penicillin G in iso-osmotic dextrose IVPB 3 million units (premix) -  Dose: 3 Million Units ; Rate: 100 mL/hr ; Route: Intravenous ; Line: Peripheral IV 10/02/18 1249 Left Forearm ; Scheduled Time: 0430  Zayda Arizmendi RN   04:41 Medication New Bag lactated ringers infusion -  Dose: 125 mL/hr ; Rate: 125 mL/hr ; Route:  Intravenous ; Line: Peripheral IV 10/02/18 1249 Left Forearm ; Scheduled Time: 0441  Zayda Arizmendi RN   04:40 Medication Stopped lactated ringers infusion -  Route: Intravenous ; Line: Peripheral IV 10/02/18 1249 Left Forearm ; Scheduled Time: 0440  Zayda Arizmendi RN   04:30 Other Flowsheet Documentation Uterine Activity Assessment   Contraction Frequency (Minutes): 2-6    Other flowsheet entries   BP: 117/97 (Device Time: 04:31:22) Temp: 98.1 °F (36.7 °C)   Temp src: Oral Heart Rate: 85 (Device Time: 04:31:22)    Linsey Null RN   04:00 Other Flowsheet Documentation Uterine Activity Assessment   Contraction Frequency (Minutes): 1-4    Other flowsheet entries   BP: 137/82 (Device Time: 04:01:30) Heart Rate: 67 (Device Time: 04:01:30)   Pain Rating (0-10): Rest: 6     Linsey Null RN   03:30 Other Flowsheet Documentation Uterine Activity Assessment   Contraction Frequency (Minutes): 2-4    Other flowsheet entries   BP: 133/61 (Device Time: 03:31:25) Heart Rate: 60 (Device Time: 03:31:25)    Linsey Null RN   03:00 Other Flowsheet Documentation Uterine Activity Assessment   Contraction Frequency (Minutes): 2-4     Linsey Null RN   03:00 Other Flowsheet Documentation Other flowsheet entries   BP: 125/71 (Device Time: 03:01:19) Heart Rate: 71 (Device Time: 03:01:19)    Brenda Cordova RN   02:30 Other Flowsheet Documentation Uterine Activity Assessment   Contraction Frequency (Minutes): 2-4    Other flowsheet entries   BP: 127/64 (Device Time: 02:31:11) Temp: 98.3 °F (36.8 °C)   Temp src: Oral Heart Rate: 62 (Device Time: 02:31:11)   Resp: 18     Linsey Null RN   02:15 Other Flowsheet Documentation Other flowsheet entries   Pain Rating (0-10): Rest: 8     Linsey Null RN   02:15 Medication Given butorphanol (STADOL) injection 1 mg -  Dose: 1 mg ; Route: Intravenous ; Line: Peripheral IV 10/02/18 1249 Left Forearm  Linsey Null RN   02:00 Other Flowsheet Documentation Uterine Activity  Assessment   Contraction Frequency (Minutes): 2-4    Other flowsheet entries   Pain Rating (0-10): Activity: 8 Pain Rating (0-10): Rest: 8    Linsey Null RN   01:30 Other Flowsheet Documentation Uterine Activity Assessment   Contraction Frequency (Minutes): 2-7     Linsey Null RN   01:00 Other Flowsheet Documentation Uterine Activity Assessment   Contraction Frequency (Minutes): 2-4    Other flowsheet entries   BP: 164/85 (Device Time: 01:01:46) Heart Rate: 64 (Device Time: 01:01:46)   Pain Rating (0-10): Activity: 7 Pain Rating (0-10): Rest: 7    Linsey Null RN   00:30 Other Flowsheet Documentation Uterine Activity Assessment   Contraction Frequency (Minutes): 3-5    Other flowsheet entries   BP: 164/80 (Device Time: 00:31:41) Temp: 98.2 °F (36.8 °C)   Temp src: Oral Heart Rate: 54 (Device Time: 00:31:41)   Resp: 18     Linsey Null RN   00:30 Medication New Bag penicillin G in iso-osmotic dextrose IVPB 3 million units (premix) -  Dose: 3 Million Units ; Route: Intravenous ; Line: Peripheral IV 10/02/18 1249 Left Forearm ; Scheduled Time: 0030  Linsey Null RN   00:20 Medication Not Given sodium chloride 0.9 % flush 3 mL -  Dose: 3 mL ; Route: Intravenous ; Reason: Order parameters not met ; Scheduled Date: 10/3/18;  ; Scheduled Time: 2100 ; Comment: IV INFUSING  Linsey Null RN   00:00 Other Flowsheet Documentation Uterine Activity Assessment   Contraction Frequency (Minutes): 3-5    Other flowsheet entries   BP: 170/96 (Device Time: 00:01:20) Heart Rate: 72 (Device Time: 00:01:20)   Pain Rating (0-10): Activity: 7 Pain Rating (0-10): Rest: 7    Linsey Nlul RN   00:00 Medication Rate/Dose Change Oxytocin-Sodium Chloride (PITOCIN) 30-0.9 UT/500ML-% infusion solution -  Dose: 20 cali-units/min ; Rate: 20 mL/hr ; Route: Intravenous ; Line: Peripheral IV 10/02/18 1249 Left Forearm ; Scheduled Time: 0000  Linsey Null RN       10/3/2018 Event Details User   23:30 Other Flowsheet  Documentation Uterine Activity Assessment   Contraction Frequency (Minutes): 3-4    Other flowsheet entries   BP: 146/72 (Device Time: 23:31:43) Heart Rate: 62 (Device Time: 23:31:43)    Linsey Null RN   23:13 Medication Rate/Dose Change Oxytocin-Sodium Chloride (PITOCIN) 30-0.9 UT/500ML-% infusion solution -  Dose: 18 cali-units/min ; Rate: 18 mL/hr ; Route: Intravenous ; Line: Peripheral IV 10/02/18 1249 Left Forearm ; Scheduled Time: 2313  Linsey Null RN   23:00 Other Flowsheet Documentation Uterine Activity Assessment   Contraction Frequency (Minutes): 3-5    Other flowsheet entries   BP:  162/101 (PT ARM WAS BENT) Temp: 98.6 °F (37 °C)   Temp src: Oral Heart Rate: 67 (Device Time: 23:01:32)   Resp: 18 Pain Rating (0-10): Activity: 7   Pain Rating (0-10): Rest: 7 Coping (labor pain only): States she is coping    Linsey Null RN   22:30 Other Flowsheet Documentation Uterine Activity Assessment   Contraction Frequency (Minutes): OCCASIONAL    Other flowsheet entries   BP: 146/89 (Device Time: 22:31:34) Heart Rate: 60 (Device Time: 22:31:34)    Linsey Null RN   22:00 Other Flowsheet Documentation Uterine Activity Assessment   Contraction Frequency (Minutes): 2-7    Other flowsheet entries   BP: 143/79 (Device Time: 22:01:48) Heart Rate: 78 (Device Time: 22:01:48)   Pain Rating (0-10): Activity: 6 Pain Rating (0-10): Rest: 6   Coping (labor pain only): States she is coping     [place], Linsey A, RN   21:53 Medication Rate/Dose Change Oxytocin-Sodium Chloride (PITOCIN) 30-0.9 UT/500ML-% infusion solution -  Dose: 16 cali-units/min ; Rate: 16 mL/hr ; Route: Intravenous ; Line: Peripheral IV 10/02/18 1249 Left Forearm ; Scheduled Time: 2153  Linsey Null RN   21:30 Other Flowsheet Documentation Uterine Activity Assessment   Contraction Frequency (Minutes): 2-5    Other flowsheet entries   BP: 157/84 (Device Time: 21:31:28) Temp: 98.8 °F (37.1 °C)   Temp src: Oral Heart Rate: 84 (Device Time: 21:31:28)    Resp: 18     Linsey Null RN   21:11 Medication Rate/Dose Change Oxytocin-Sodium Chloride (PITOCIN) 30-0.9 UT/500ML-% infusion solution -  Dose: 14 cali-units/min ; Rate: 14 mL/hr ; Route: Intravenous ; Line: Peripheral IV 10/02/18 1249 Left Forearm ; Scheduled Time: 2111  Linsey Null RN   21:00 Other Flowsheet Documentation Uterine Activity Assessment   Contraction Frequency (Minutes): OCCASIONAL    Other flowsheet entries   BP: 156/88 (Device Time: 21:01:29) Heart Rate: 72 (Device Time: 21:01:29)   Pain Rating (0-10): Activity: 6 Pain Rating (0-10): Rest: 6   Coping (labor pain only): States she is coping     Linsey Null RN   20:30 Other Flowsheet Documentation Uterine Activity Assessment   Contraction Frequency (Minutes): OCCASIONAL     Linsey Null RN   20:30 Medication Rate/Dose Change Oxytocin-Sodium Chloride (PITOCIN) 30-0.9 UT/500ML-% infusion solution -  Dose: 12 cali-units/min ; Rate: 12 mL/hr ; Route: Intravenous ; Line: Peripheral IV 10/02/18 1249 Left Forearm ; Scheduled Time: 2030  Linsey Null RN   20:00 Other Flowsheet Documentation Uterine Activity Assessment   Contraction Frequency (Minutes): OCCASIONAL    Other flowsheet entries   BP: 167/97 (Device Time: 20:01:10) Heart Rate: 80 (Device Time: 20:01:10)   Pain Rating (0-10): Activity: 6 Pain Rating (0-10): Rest: 6    Linsey Null RN   20:00 Medication New Bag penicillin G in iso-osmotic dextrose IVPB 3 million units (premix) -  Dose: 3 Million Units ; Route: Intravenous ; Line: Peripheral IV 10/02/18 1249 Left Forearm ; Scheduled Time: 2030  Linsey Null RN   20:00 Medication Rate/Dose Change Oxytocin-Sodium Chloride (PITOCIN) 30-0.9 UT/500ML-% infusion solution -  Dose: 10 cali-units/min ; Rate: 10 mL/hr ; Route: Intravenous ; Line: Peripheral IV 10/02/18 1249 Left Forearm ; Scheduled Time: 2000  Linsey Null RN   19:42:06 Orders Placed penicillin G in iso-osmotic dextrose IVPB 3 million units (premix)  Cabrera  Xiomara Cruz MD   19:39:26 Orders Discontinued penicillin G potassium injection 3 Million Units  [facility]   19:36:51 Orders Placed penicillin G potassium injection 3 Million Units  Xiomara Rees MD   19:30 Other Flowsheet Documentation Uterine Activity Assessment   Contraction Frequency (Minutes): 2-5    Other flowsheet entries   BP: 148/84 (Device Time: 19:31:24) Temp: 98.6 °F (37 °C)   Temp src: Oral Heart Rate: 64 (Device Time: 19:31:24)   Resp: 18 Response: See orders   Provider Name: DR REES Coping (labor pain only): States she is coping   Notification Route: Phone call     Lisney Null RN   19:30 Peripheral IV 10/02/18 1249 Left Forearm Assessment Site Assessment: Clean; Dry; Intact Dressing Status: Clean; Dry; Intact   Line Status: Infusing Reason Not Rotated: Not due   Dressing Type: Transparent     Linsey Null RN   19:00 Other Flowsheet Documentation Other flowsheet entries   BP: 149/82 (Device Time: 19:01:22) Heart Rate: 76 (Device Time: 19:01:22)    Ana Bragg RN   18:38 Medication Rate/Dose Change Oxytocin-Sodium Chloride (PITOCIN) 30-0.9 UT/500ML-% infusion solution -  Dose: 8 cali-units/min ; Rate: 8 mL/hr ; Route: Intravenous ; Line: Peripheral IV 10/02/18 1249 Left Forearm ; Scheduled Time: 1838  Ana Bragg RN   18:30 Other Flowsheet Documentation Uterine Activity Assessment   Contraction Frequency (Minutes): 9-10     Ana Bragg RN   18:00 Other Flowsheet Documentation Uterine Activity Assessment   Contraction Frequency (Minutes): 2    Other flowsheet entries   BP: 148/79 (Device Time: 18:01:12) Heart Rate: 82 (Device Time: 18:01:12)    Ana Bragg RN   17:47 Other Flowsheet Documentation Other flowsheet entries   Temp: 98.5 °F (36.9 °C) Temp src: Temporal Art    Ana Bragg RN   17:47 Medication Rate/Dose Change Oxytocin-Sodium Chloride (PITOCIN) 30-0.9 UT/500ML-% infusion solution -  Dose: 6 cali-units/min ;  Rate: 6 mL/hr ; Route: Intravenous ; Line: Peripheral IV 10/02/18 1249 Left Forearm ; Scheduled Time: 1747  Ana Bragg RN   17:44 Other Flowsheet Documentation Uterine Activity Assessment   Contraction Frequency (Minutes): 5     Ana Bragg RN   17:30 Other Flowsheet Documentation Uterine Activity Assessment   Contraction Frequency (Minutes): 2     Ana Bragg RN   17:00 Other Flowsheet Documentation Other flowsheet entries   BP: 130/78 (Device Time: 17:01:14) Heart Rate: 85 (Device Time: 17:01:14)    Ana Bragg RN   16:44 Medication Rate/Dose Change Oxytocin-Sodium Chloride (PITOCIN) 30-0.9 UT/500ML-% infusion solution -  Dose: 4 cali-units/min ; Rate: 4 mL/hr ; Route: Intravenous ; Line: Peripheral IV 10/02/18 1249 Left Forearm ; Scheduled Time: 1644  Ana Bragg RN   16:29 Other Flowsheet Documentation Uterine Activity Assessment   Contraction Frequency (Minutes): none     Ana Bragg RN   16:02 Medication Not Given sodium chloride 0.9 % flush 3 mL -  Dose: 3 mL ; Route: Intravenous ; Reason: Other ; Scheduled Time: 0900  Ana Bragg RN   16:01 Medication New Bag Oxytocin-Sodium Chloride (PITOCIN) 30-0.9 UT/500ML-% infusion solution -  Dose: 2 cali-units/min ; Rate: 2 mL/hr ; Route: Intravenous ; Line: Peripheral IV 10/02/18 1249 Left Forearm ; Scheduled Time: 1558  Ana Bragg RN   16:00 Other Flowsheet Documentation Uterine Activity Assessment   Contraction Frequency (Minutes): none    Other flowsheet entries   BP: 152/69 (Device Time: 16:01:35) Heart Rate: 80 (Device Time: 16:01:35)    Ana Bragg RN   16:00 Medication Stopped Oxytocin-Sodium Chloride (PITOCIN) 30-0.9 UT/500ML-% infusion solution -  Route: Intravenous ; Line: Peripheral IV 10/02/18 1249 Left Forearm ; Scheduled Time: 1600  Ana Bragg RN   15:00 Other Flowsheet Documentation Other flowsheet entries   BP: 143/77 (Device Time: 15:01:32) Heart Rate: 63  (Device Time: 15:01:32)    Ana Bragg RN   14:30 Other Flowsheet Documentation Uterine Activity Assessment   Contraction Frequency (Minutes): 3-7     Ana Bragg RN   14:02 Other Flowsheet Documentation Other flowsheet entries   BP: 134/71 (Device Time: 14:01:31) Heart Rate: 72 (Device Time: 14:01:31)    Cris Mcnally RN   14:00:31 Orders Placed Turn pit off for 2 hours, patient may have Coke but no food  Nursing Communication  Xiomara Owens MD   14:00 Other Flowsheet Documentation Uterine Activity Assessment   Contraction Frequency (Minutes): 2-4    Other flowsheet entries   Pain Rating (0-10): Rest: 8     Cris Mcnally RN   13:59 Other Flowsheet Documentation Other flowsheet entries   Response: See orders Provider Name: dr owens   Notification Route: Phone call     Ana Bragg RN   13:46 Medication Given butorphanol (STADOL) injection 1 mg -  Dose: 1 mg ; Route: Intravenous ; Line: Peripheral IV 10/02/18 1249 Left Forearm  Cris Mcnally RN   13:46 Medication New Bag penicillin G in iso-osmotic dextrose IVPB 3 million units (premix) -  Dose: 3 Million Units ; Route: Intravenous ; Line: Peripheral IV 10/02/18 1249 Left Forearm ; Scheduled Time: 1400  Cris Mcnally RN   13:32 Other Flowsheet Documentation Other flowsheet entries   BP: 161/98 (Device Time: 13:31:34) Heart Rate: 92 (Device Time: 13:31:34)    Cris Mcnally RN   13:30 Other Flowsheet Documentation Uterine Activity Assessment   Contraction Frequency (Minutes): 2-4    Other flowsheet entries   Pain Rating (0-10): Rest: 7     Cris Mcnally RN   13:13 Other Flowsheet Documentation Other flowsheet entries   Temp: 97.8 °F (36.6 °C)     Cris Mcnally RN   13:01 Other Flowsheet Documentation Other flowsheet entries   BP: 147/85 (Device Time: 13:01:26) Heart Rate: 77 (Device Time: 13:01:26)    Cris Mcnally RN   13:00 Other Flowsheet Documentation Uterine Activity Assessment   Contraction Frequency  (Minutes): 2-4    Other flowsheet entries   Pain Rating (0-10): Rest: 7     Cris Mcnally RN   12:46 Other Flowsheet Documentation Other flowsheet entries   BP: 150/74 (Device Time: 12:45:59) Heart Rate: 86 (Device Time: 12:45:59)    Cris Mcnally RN   12:30 Other Flowsheet Documentation Uterine Activity Assessment   Contraction Frequency (Minutes): 2-4    Other flowsheet entries   Pain Rating (0-10): Rest: 6     Cris Mcnally RN   12:18 Specimens Collected PAMG-1, Rapid Assay For ROM - Amniotic Fluid, Amniotic Sac  - ID: [id number] Type: Amniotic Fluid  Cassi Matt RN   12:15:43 Orders Placed PAMG-1, Rapid Assay For ROM - Amniotic Fluid, Amniotic Sac  Xiomara Rees MD   12:02 Other Flowsheet Documentation Other flowsheet entries   BP: 157/74 (Device Time: 12:01:41) Heart Rate: 75 (Device Time: 12:01:41)    Cris Mcnally RN   12:00 Other Flowsheet Documentation Uterine Activity Assessment   Contraction Frequency (Minutes): 2-4    Other flowsheet entries   Pain Rating (0-10): Rest: 6     Cris Mcnally RN   11:55 Other Flowsheet Documentation Other flowsheet entries   Labor Interventions: TOCO adjusted     Cris Mcnally RN   11:45 Other Flowsheet Documentation Other flowsheet entries   Labor Interventions: TOCO adjusted     Cris Mcnally RN   11:32 Other Flowsheet Documentation Other flowsheet entries   BP: 152/87 (Device Time: 11:31:31) Heart Rate: 78 (Device Time: 11:31:31)    Cris Mcnally RN   11:30 Other Flowsheet Documentation Uterine Activity Assessment   Contraction Frequency (Minutes): 2-6    Other flowsheet entries   Pain Rating (0-10): Rest: 6     Cris Mcnalyl RN   11:15 Other Flowsheet Documentation Other flowsheet entries   Temp: 98.3 °F (36.8 °C) Temp src: Oral    Cris Mcnally RN   11:11 Other Flowsheet Documentation Other flowsheet entries   Labor Interventions: TOCO adjusted     Cris Mcnally RN   11:02 Other Flowsheet Documentation Other flowsheet entries    BP: 141/81 (Device Time: 11:01:35) Heart Rate: 71 (Device Time: 11:01:35)    Cris Mcnally RN   11:00 Other Flowsheet Documentation Uterine Activity Assessment   Contraction Frequency (Minutes): 2-5    Other flowsheet entries   Pain Rating (0-10): Rest: 6     Cris Mcnally RN   10:58 Other Flowsheet Documentation Other flowsheet entries   Response: No new orders Provider Name: Dr. Rees   Notification Route: In person, on unit     Cris Mcnally RN   10:47 Other Flowsheet Documentation Other flowsheet entries   Labor Interventions: TOCO adjusted     Cris Mcnally RN   10:39 Other Flowsheet Documentation Other flowsheet entries   Labor Interventions: TOCO adjusted     Mcnally, Cris N, RN   10:33 Medication New Bag penicillin G in iso-osmotic dextrose IVPB 3 million units (premix) -  Dose: 3 Million Units ; Route: Intravenous ; Line: Peripheral IV 10/02/18 1249 Left Forearm ; Scheduled Time: 1000  Cris Mcnally RN   10:31 Other Flowsheet Documentation Other flowsheet entries   BP: 148/96 (Device Time: 10:31:28) Heart Rate: 79 (Device Time: 10:31:28)    Cris Mcnally RN   10:30 Other Flowsheet Documentation Uterine Activity Assessment   Contraction Frequency (Minutes): 2-4     Cris Mcnally RN   10:01 Other Flowsheet Documentation Other flowsheet entries   BP: 146/80 (Device Time: 10:01:21) Heart Rate: 79 (Device Time: 10:01:21)    Cris Mcnally RN   10:00 Other Flowsheet Documentation Uterine Activity Assessment   Contraction Frequency (Minutes): 2-4    Other flowsheet entries   Pain Rating (0-10): Rest: 6     Cris Mcnally RN   09:40 Other Flowsheet Documentation Other flowsheet entries   BP: 130/63 (Device Time: 09:39:36) Heart Rate: 83 (Device Time: 09:39:36)   Pain Rating (0-10): Rest: 5     Cris Mcnally RN   09:39 Other Flowsheet Documentation Other flowsheet entries   Labor Interventions: TOCO adjusted     Cris Mcnally RN   09:30 Other Flowsheet Documentation Uterine Activity Assessment    Contraction Frequency (Minutes): 2-5     Cris Mcnally RN   09:26 Other Flowsheet Documentation Other flowsheet entries   Labor Interventions: TOCO adjusted     Cris Mcnally RN   09:13 Other Flowsheet Documentation Other flowsheet entries   BP: 133/95 (Device Time: 09:12:35) Temp: 97.5 °F (36.4 °C)   Temp src: Temporal Art Heart Rate: 92 (Device Time: 09:12:35)    Cris Mcnally RN   09:00 Other Flowsheet Documentation Uterine Activity Assessment   Contraction Frequency (Minutes): 2-4    Other flowsheet entries   Pain Rating (0-10): Rest: 4     Cris Mcnally RN   08:43 Other Flowsheet Documentation Other flowsheet entries   BP: 146/86 (Device Time: 08:42:45) Heart Rate: 75 (Device Time: 08:42:45)    Cris Mcnally RN   08:39 Other Flowsheet Documentation Other flowsheet entries   Labor Interventions: TOCO adjusted     Cris Mcnally RN   08:31 Other Flowsheet Documentation Other flowsheet entries   BP: 160/96 (Device Time: 08:31:19) Heart Rate: 76 (Device Time: 08:31:19)    Cris Mcnally RN   08:30 Other Flowsheet Documentation Uterine Activity Assessment   Contraction Frequency (Minutes): 1-3    Other flowsheet entries   Pain Rating (0-10): Rest: 4     Cris Mcnally RN   08:01 Other Flowsheet Documentation Other flowsheet entries   BP: 145/100 (Device Time: 08:01:17) Heart Rate: 68 (Device Time: 08:01:17)    Cirs Mcnally RN   08:00 Other Flowsheet Documentation Uterine Activity Assessment   Contraction Frequency (Minutes): 1-3    Other flowsheet entries   Pain Rating (0-10): Rest: 4     Cris Mcnally RN   07:35 Other Flowsheet Documentation Other flowsheet entries   Labor Interventions: TOCO adjusted     Cris Mcnally RN   07:31 Other Flowsheet Documentation Other flowsheet entries   BP: 128/81 (Device Time: 07:31:24) Temp: 97.2 °F (36.2 °C)   Temp src: Temporal Art Heart Rate: 74 (Device Time: 07:31:24)   Resp: 18     Cris Mcnally RN   07:30 Other Flowsheet Documentation Uterine Activity  Assessment   Contraction Frequency (Minutes): 2-4    Other flowsheet entries   Pain Rating (0-10): Rest: 4     Cris Mcnally RN   07:20 Other Flowsheet Documentation Other flowsheet entries   Pain Rating (0-10): Rest: 4 Coping (labor pain only): States she is coping    Cris Mcnally RN   07:20 Peripheral IV 10/02/18 1249 Left Forearm Assessment Phlebitis: 0-->no symptoms Site Assessment: Clean; Dry; Intact   Dressing Status: Clean; Dry; Intact Line Status: Infusing   Dressing Type: Transparent     Cris Mcnally RN   07:00 Other Flowsheet Documentation Uterine Activity Assessment   Contraction Frequency (Minutes): 2-4    Other flowsheet entries   BP: 141/77 (Device Time: 07:01:22) Heart Rate: 84 (Device Time: 07:01:22)   Resp: 18     Linsey Null RN   06:42 Medication New Bag lactated ringers infusion -  Dose: 125 mL/hr ; Rate: 125 mL/hr ; Route: Intravenous ; Line: Peripheral IV 10/02/18 1249 Left Forearm ; Scheduled Time: 0642  Linsey Null RN   06:41 Medication Stopped lactated ringers infusion -  Route: Intravenous ; Line: Peripheral IV 10/02/18 1249 Left Forearm ; Scheduled Time: 0641  Linsey Null RN   06:37 Other Flowsheet Documentation Other flowsheet entries   Pain Rating (0-10): Rest: 6     Linsey Null RN   06:37 Medication Given butorphanol (STADOL) injection 1 mg -  Dose: 1 mg ; Route: Intravenous ; Line: Peripheral IV 10/02/18 1249 Left Forearm  Linsey Null RN   06:30 Other Flowsheet Documentation Uterine Activity Assessment   Contraction Frequency (Minutes): 2-3     Linsey Null RN   06:30 Medication New Bag penicillin G in iso-osmotic dextrose IVPB 3 million units (premix) -  Dose: 3 Million Units ; Rate: 100 mL/hr ; Route: Intravenous ; Line: Peripheral IV 10/02/18 1249 Left Forearm ; Scheduled Time: 0600  Linsey Null RN   06:01 Other Flowsheet Documentation Other flowsheet entries   Pain Rating (0-10): Activity: 5 Pain Rating (0-10): Rest: 5    Linsey Null RN   06:00  Other Flowsheet Documentation Uterine Activity Assessment   Contraction Frequency (Minutes): 1.5-2    Other flowsheet entries   BP: 151/91 (Device Time: 06:01:23) Temp: 98.8 °F (37.1 °C)   Temp src: Oral Heart Rate: 81 (Device Time: 06:01:23)   Resp: 18     CarrollLinsey, RN   05:30 Other Flowsheet Documentation Uterine Activity Assessment   Contraction Frequency (Minutes): 2-3    Other flowsheet entries   BP: 151/76 (Device Time: 05:31:33) Heart Rate: 63 (Device Time: 05:31:33)    TennilleLinsey vee, RN   05:05 Other Flowsheet Documentation Other flowsheet entries   Pain Rating (0-10): Activity: 5 Pain Rating (0-10): Rest: 5    Linsey Null, RN   05:00 Other Flowsheet Documentation Uterine Activity Assessment   Contraction Frequency (Minutes): 1.5-2    Other flowsheet entries   BP: 151/87 (Device Time: 05:01:33) Heart Rate: 63 (Device Time: 05:01:33)    Linsey Null, RN   04:30 Other Flowsheet Documentation Uterine Activity Assessment   Contraction Frequency (Minutes): 2-3    Other flowsheet entries   BP: 152/86 (Device Time: 04:31:27) Heart Rate: 62 (Device Time: 04:31:27)    TennilleLinsey vee, RN   04:03 Other Flowsheet Documentation Other flowsheet entries   Pain Rating (0-10): Activity: 4 Pain Rating (0-10): Rest: 4    TennilleLinsey vee, RN   04:00 Other Flowsheet Documentation Uterine Activity Assessment   Contraction Frequency (Minutes): 2-3    Other flowsheet entries   BP: 140/92 (Device Time: 04:01:29) Heart Rate: 56 (Device Time: 04:01:29)    CarrollIzabelyl MARIO, RN   03:56 Other Flowsheet Documentation Other flowsheet entries   Temp: 98.9 °F (37.2 °C) Temp src: Oral   Resp: 16     TennilleIzabelyl A, RN   03:32 Other Flowsheet Documentation Other flowsheet entries   BP: 129/76 (Device Time: 03:31:49) Heart Rate: 56 (Device Time: 03:31:49)    TennilleLinsey vee, RN   03:30 Other Flowsheet Documentation Uterine Activity Assessment   Contraction Frequency (Minutes): 2-6    Other flowsheet entries   BP: 129/76 (Device Time:  03:31:49) Heart Rate: 56 (Device Time: 03:31:49)    Linsey Null RN   03:00 Other Flowsheet Documentation Uterine Activity Assessment   Contraction Frequency (Minutes): 2-3    Other flowsheet entries   BP: 136/83 (Device Time: 03:01:29) Heart Rate: 61 (Device Time: 03:01:29)   Resp: 16 Pain Rating (0-10): Activity: 4   Pain Rating (0-10): Rest: 4     Linsey Null RN   02:56 Specimens Collected PAMG-1, Rapid Assay For ROM - Amniotic Fluid, Amniotic Sac  - ID: [id number] Type: Amniotic Fluid  Linsey Null RN   02:50:17 Orders Placed PAMG-1, Rapid Assay For ROM - Amniotic Fluid, Amniotic Sac  Xiomara Rees MD   02:37 Rupture of Membranes Type: spontaneous rupture of membranes; Baby: Garcia, Pending  Linsey Null RN   02:31:46 Orders Placed POC Protein, Urine, Qualitative, Dipstick  Xiomara Rees MD   02:30 Other Flowsheet Documentation Uterine Activity Assessment   Contraction Frequency (Minutes): 2-3    Other flowsheet entries   Resp: 18     Linsey Null RN   02:30 Medication New Bag penicillin G in iso-osmotic dextrose IVPB 3 million units (premix) -  Dose: 3 Million Units ; Rate: 100 mL/hr ; Route: Intravenous ; Line: Peripheral IV 10/02/18 1249 Left Forearm ; Scheduled Time: 0200  Linsey Null RN   02:01 Other Flowsheet Documentation Other flowsheet entries   BP: 139/72 (Device Time: 02:01:25) Heart Rate: 52 (Device Time: 02:01:25)    Linsey Null RN   02:00 Other Flowsheet Documentation Uterine Activity Assessment   Contraction Frequency (Minutes): 2-3    Other flowsheet entries   Pain Rating (0-10): Activity: 4 Pain Rating (0-10): Rest: 4    Linsey Null RN   01:55:58 Orders Placed methylergonovine (METHERGINE) injection 200 mcg ; carboprost (HEMABATE) injection 250 mcg ; misoprostol (CYTOTEC) tablet 800 mcg ; Tissue Pathology Exam  Xiomara Rees MD   01:45 Medication Hold Oxytocin-Lactated Ringers (PITOCIN) 20 UNIT/L in lactated Ringer's 1000 mL IVPB  -  Dose: 0 mL/hr ; Rate: 0 mL/hr ; Route: Intravenous ; Line: Peripheral IV 10/02/18 1249 Left Forearm ; Reason: Order parameters not met ; Scheduled Date: 10/2/18;  ; Scheduled Time: 1515  Linsey Null RN   01:42 Other Flowsheet Documentation Other flowsheet entries   Pain Rating (0-10): Activity: 4 Pain Rating (0-10): Rest: 4    Linsey Null RN   01:35 Other Flowsheet Documentation Other flowsheet entries   Temp: 99.1 °F (37.3 °C) Temp src: Oral   Resp: 18     Linsey Null, RN   01:31 Other Flowsheet Documentation Other flowsheet entries   BP: 142/72 (Device Time: 01:31:28) Heart Rate: 52 (Device Time: 01:31:28)    Linsey Null RN   01:30 Other Flowsheet Documentation Uterine Activity Assessment   Contraction Frequency (Minutes): 1.5-5     Linsey Null RN   01:05 Other Flowsheet Documentation Other flowsheet entries   Resp: 18     Linsey Null RN   01:01 Other Flowsheet Documentation Other flowsheet entries   BP: 147/88 (Device Time: 01:01:23) Heart Rate: 78 (Device Time: 01:01:23)    Linsey Null RN   01:00 Other Flowsheet Documentation Uterine Activity Assessment   Contraction Frequency (Minutes): 2-3    Other flowsheet entries   Pain Rating (0-10): Activity: 3 Pain Rating (0-10): Rest: 3    Linsey Null RN   00:35 Other Flowsheet Documentation Other flowsheet entries   Resp: 18     Linsey Null, RN   00:31 Other Flowsheet Documentation Other flowsheet entries   BP: 145/86 (Device Time: 00:31:21) Heart Rate: 91 (Device Time: 00:31:21)    Linsey Null, RN   00:30 Other Flowsheet Documentation Uterine Activity Assessment   Contraction Frequency (Minutes): 1.5-3     NaguaboLinsey vee, RN   00:00 Other Flowsheet Documentation Uterine Activity Assessment   Contraction Frequency (Minutes): 2-3    Other flowsheet entries   BP: 154/87 (Device Time: 00:01:35) Temp: 98.7 °F (37.1 °C)   Temp src: Oral Heart Rate: 61 (Device Time: 00:01:35)   Resp: 18 Pain Rating (0-10): Activity: 0   Pain Rating  (0-10): Rest: 0     Linsey Null RN       10/2/2018 Event Details User   23:35 Other Flowsheet Documentation Other flowsheet entries   Resp: 18     Linsey Null RN   23:31 Other Flowsheet Documentation Other flowsheet entries   BP: 150/87 (Device Time: 23:31:21) Heart Rate: 71 (Device Time: 23:31:21)    Linsey Null RN   23:30 Other Flowsheet Documentation Uterine Activity Assessment   Contraction Frequency (Minutes): 2     Linsey Null RN   23:20 Other Flowsheet Documentation Other flowsheet entries   BP: 140/74 (Device Time: 23:20:00) Heart Rate: 88 (Device Time: 23:20:00)   Resp: 18     Linsey Null RN   23:00 Other Flowsheet Documentation Uterine Activity Assessment   Contraction Frequency (Minutes): 2-4    Other flowsheet entries   Pain Rating (0-10): Activity: 0 Pain Rating (0-10): Rest: 0    Linsey Null RN   22:35 Other Flowsheet Documentation Other flowsheet entries   Resp: 18     Linsey Null RN   22:31 Other Flowsheet Documentation Other flowsheet entries   BP: 153/89 (Device Time: 22:31:24) Heart Rate: 66 (Device Time: 22:31:24)    Linsey Null RN   22:30 Other Flowsheet Documentation Uterine Activity Assessment   Contraction Frequency (Minutes): 2-3.5     Linsey Null RN   22:20 Medication New Bag penicillin G in iso-osmotic dextrose IVPB 3 million units (premix) -  Dose: 3 Million Units ; Rate: 100 mL/hr ; Route: Intravenous ; Line: Peripheral IV 10/02/18 1249 Left Forearm ; Scheduled Time: 2200  Linsey Null RN   22:05 Other Flowsheet Documentation Other flowsheet entries   Resp: 18     Linsey uNll RN   22:01 Other Flowsheet Documentation Other flowsheet entries   BP: 159/86 (Device Time: 22:01:22) Heart Rate: 63 (Device Time: 22:01:22)    Linsey Null RN   22:00 Other Flowsheet Documentation Uterine Activity Assessment   Contraction Frequency (Minutes): 2-4    Other flowsheet entries   Pain Rating (0-10): Activity: 0 Pain Rating (0-10): Rest: 0    Linsey Null  RN   21:35 Other Flowsheet Documentation Other flowsheet entries   Resp: 18     Linsey Null, RN   21:31 Other Flowsheet Documentation Other flowsheet entries   BP: 155/81 (Device Time: 21:31:22) Heart Rate: 70 (Device Time: 21:31:22)    Linsey Null, RN   21:30 Other Flowsheet Documentation Uterine Activity Assessment   Contraction Frequency (Minutes): 2-3     Linsey Null, RN   21:21 Medication New Bag lactated ringers infusion -  Dose: 125 mL/hr ; Rate: 125 mL/hr ; Route: Intravenous ; Line: Peripheral IV 10/02/18 1249 Left Forearm ; Scheduled Time: 2121  Linsey Null, RN   21:20 Other Flowsheet Documentation Other flowsheet entries   BP: 142/86 (Device Time: 21:17:38) Heart Rate: 83 (Device Time: 21:17:38)   Resp: 18     Linsey Null RN   21:19 Medication Stopped lactated ringers infusion -  Route: Intravenous ; Line: Peripheral IV 10/02/18 1249 Left Forearm ; Scheduled Time: 2119  Linsey Null RN   21:05 Other Flowsheet Documentation Other flowsheet entries   Resp: 18     Linsey Null, RN   21:01 Other Flowsheet Documentation Other flowsheet entries   BP: 142/74 (Device Time: 21:01:25) Heart Rate: 71 (Device Time: 21:01:25)    Linsey Null, RN   21:00 Other Flowsheet Documentation Uterine Activity Assessment   Contraction Frequency (Minutes): 2-3     Linsey Null RN   20:35 Other Flowsheet Documentation Other flowsheet entries   Resp: 18     Linsey Null, RN   20:31 Other Flowsheet Documentation Other flowsheet entries   BP: 141/82 (Device Time: 20:31:23) Heart Rate: 67 (Device Time: 20:31:23)    Linsey Null, RN   20:30 Other Flowsheet Documentation Uterine Activity Assessment   Contraction Frequency (Minutes): 2-3.5     TennilleLinsey, RN   20:05 Other Flowsheet Documentation Other flowsheet entries   Resp: 18     DentonIzabelyl MARIO, RN   20:01 Other Flowsheet Documentation Other flowsheet entries   BP: 146/90 (Device Time: 20:01:20) Heart Rate: 74 (Device Time: 20:01:20)    Linsey Null  A, RN   20:00 Other Flowsheet Documentation Uterine Activity Assessment   Contraction Frequency (Minutes): 2-3.5    Other flowsheet entries   Pain Rating (0-10): Activity: 0 Pain Rating (0-10): Rest: 0   Coping (labor pain only): States she is coping     Derry, Linsey A, RN   20:00 Peripheral IV 10/02/18 1249 Left Forearm Assessment Phlebitis: 0-->no symptoms Site Assessment: Clean; Dry; Intact   Dressing Status: Clean; Dry; Intact Line Status: Infusing   Reason Not Rotated: Not due Dressing Type: Transparent    Linsey Null RN   19:35 Other Flowsheet Documentation Other flowsheet entries   Temp: 98.8 °F (37.1 °C) Temp src: Temporal Art   Resp: 18     Linsey Null RN   19:31 Other Flowsheet Documentation Other flowsheet entries   BP: 142/77 (Device Time: 19:31:21) Heart Rate: 67 (Device Time: 19:31:21)    Linsey Null RN   19:30 Other Flowsheet Documentation Uterine Activity Assessment   Contraction Frequency (Minutes): 2-3     Linsey Null RN   19:05 Other Flowsheet Documentation Other flowsheet entries   Resp: 18     Linsey Null RN   19:01 Other Flowsheet Documentation Other flowsheet entries   BP: 143/82 (Device Time: 19:01:18) Heart Rate: 74 (Device Time: 19:01:18)    Linsey Null RN   18:59 Medication Rate/Dose Change Oxytocin-Sodium Chloride (PITOCIN) 30-0.9 UT/500ML-% infusion solution -  Dose: 20 cali-units/min ; Rate: 20 mL/hr ; Route: Intravenous ; Line: Peripheral IV 10/02/18 1249 Left Forearm ; Scheduled Time: 1859  Michaelle Portillo RN   18:58 Other Flowsheet Documentation Uterine Activity Assessment   Contraction Frequency (Minutes): 2-3     Michaelle Portillo RN   18:37 Medication Rate/Dose Change Oxytocin-Sodium Chloride (PITOCIN) 30-0.9 UT/500ML-% infusion solution -  Dose: 18 cali-units/min ; Rate: 18 mL/hr ; Route: Intravenous ; Line: Peripheral IV 10/02/18 1249 Left Forearm ; Scheduled Time: 1837  Walker, Michaelle A, RN   18:31 Other Flowsheet Documentation Other flowsheet  entries   BP: 138/79 (Device Time: 18:31:15) Heart Rate: 87 (Device Time: 18:31:15)   Resp: 18     Michaelle Portillo RN   18:04 Other Flowsheet Documentation Other flowsheet entries   BP: 155/95 (Device Time: 18:03:47) Heart Rate: 81 (Device Time: 18:03:47)    Michaelle Portillo RN   18:03 Medication New Bag penicillin G in iso-osmotic dextrose IVPB 3 million units (premix) -  Dose: 3 Million Units ; Route: Intravenous ; Line: Peripheral IV 10/02/18 1249 Left Forearm ; Scheduled Time: 1800  Michaelle Portillo RN   18:01 Other Flowsheet Documentation Other flowsheet entries   BP:  157/103 (Pt had arm bent for this BP) Heart Rate: 72 (Device Time: 18:01:11)   Resp: 18     Michaelle Portillo RN   18:00 Other Flowsheet Documentation Uterine Activity Assessment   Contraction Frequency (Minutes): 2-3     Michaelle Portillo RN   18:00 Medication Rate/Dose Change Oxytocin-Sodium Chloride (PITOCIN) 30-0.9 UT/500ML-% infusion solution -  Dose: 16 cali-units/min ; Rate: 16 mL/hr ; Route: Intravenous ; Line: Peripheral IV 10/02/18 1249 Left Forearm ; Scheduled Time: 1800  Michaelle Portillo RN   17:31 Other Flowsheet Documentation Uterine Activity Assessment   Contraction Frequency (Minutes): 2-5    Other flowsheet entries   BP: 146/82 (Device Time: 17:31:12) Heart Rate: 73 (Device Time: 17:31:12)    Michaelle Portillo RN   17:07 Other Flowsheet Documentation Other flowsheet entries   BP: 129/73 (Device Time: 17:06:45) Heart Rate: 79 (Device Time: 17:06:45)   Resp: 18     Michaelle Portillo RN   17:00 Other Flowsheet Documentation Uterine Activity Assessment   Contraction Frequency (Minutes): 3-4    Other flowsheet entries   Temp: 98.1 °F (36.7 °C) Temp src: Temporal Art    Michaelle Portillo RN   17:00 Medication Rate/Dose Change Oxytocin-Sodium Chloride (PITOCIN) 30-0.9 UT/500ML-% infusion solution -  Dose: 14 cali-units/min ; Rate: 14 mL/hr ; Route: Intravenous ; Line: Peripheral IV 10/02/18 1249 Left Forearm ; Scheduled  Time: 1700  Michaelle Portillo RN   16:33 Other Flowsheet Documentation Uterine Activity Assessment   Contraction Frequency (Minutes): 3-4     Michaelle Portillo RN   16:33 Medication Rate/Dose Change Oxytocin-Sodium Chloride (PITOCIN) 30-0.9 UT/500ML-% infusion solution -  Dose: 12 cali-units/min ; Rate: 12 mL/hr ; Route: Intravenous ; Line: Peripheral IV 10/02/18 1249 Left Forearm ; Scheduled Time: 1633  Michaelle Portillo RN   16:00 Other Flowsheet Documentation Uterine Activity Assessment   Contraction Frequency (Minutes): 2-4    Other flowsheet entries   Pain Rating (0-10): Rest: 2     Michaelle Portillo RN   16:00 Medication Rate/Dose Change Oxytocin-Sodium Chloride (PITOCIN) 30-0.9 UT/500ML-% infusion solution -  Dose: 10 cali-units/min ; Rate: 10 mL/hr ; Route: Intravenous ; Line: Peripheral IV 10/02/18 1249 Left Forearm ; Scheduled Time: 1600  Michaelle Portillo RN   15:28 Other Flowsheet Documentation Uterine Activity Assessment   Contraction Frequency (Minutes): Occasional     Michaelle Portillo RN   15:28 Medication Rate/Dose Change Oxytocin-Sodium Chloride (PITOCIN) 30-0.9 UT/500ML-% infusion solution -  Dose: 8 cali-units/min ; Rate: 8 mL/hr ; Route: Intravenous ; Line: Peripheral IV 10/02/18 1249 Left Forearm ; Scheduled Time: 1528  Michaelle Portillo RN   15:14 Other Flowsheet Documentation Other flowsheet entries   Pain Rating (0-10): Rest: 3     Michaelle Portillo RN   15:14 Medication Given acetaminophen (TYLENOL) tablet 1,000 mg -  Dose: 1,000 mg ; Route: Oral  Michaelle Portillo RN   15:08:39 Orders Placed acetaminophen (TYLENOL) tablet 1,000 mg  Xiomara Rees MD   15:00 Other Flowsheet Documentation Uterine Activity Assessment   Contraction Frequency (Minutes): 3-5     Michaelle Portillo RN   15:00 Medication Rate/Dose Change Oxytocin-Sodium Chloride (PITOCIN) 30-0.9 UT/500ML-% infusion solution -  Dose: 6 cali-units/min ; Rate: 6 mL/hr ; Route: Intravenous ; Line: Peripheral IV  10/02/18 1249 Left Forearm ; Scheduled Time: 1500  Michaelle Portillo RN   14:36 Other Flowsheet Documentation Other flowsheet entries   BP: 150/86 (Device Time: 14:36:18) Heart Rate: 79 (Device Time: 14:36:18)   Resp: 18     Michaelle Portillo RN   14:30 Other Flowsheet Documentation Uterine Activity Assessment   Contraction Frequency (Minutes): 3-4     Michaelle Portillo RN   14:30 Medication Rate/Dose Change Oxytocin-Sodium Chloride (PITOCIN) 30-0.9 UT/500ML-% infusion solution -  Dose: 4 cali-units/min ; Rate: 4 mL/hr ; Route: Intravenous ; Line: Peripheral IV 10/02/18 1249 Left Forearm ; Scheduled Time: 1430  Michaelle Portillo RN   14:28:25 Orders Placed Oxytocin-Lactated Ringers (PITOCIN) 20 UNIT/L in lactated Ringer's 1000 mL IVPB  Xiomara Rees MD   14:28:24 Orders Placed Oxytocin-Lactated Ringers (PITOCIN) 20 UNIT/L in lactated Ringer's 1000 mL IVPB  Xiomara Rees MD   14:00 Other Flowsheet Documentation Uterine Activity Assessment   Contraction Frequency (Minutes): 3-4     Michaelle Portillo RN   13:53 Medication New Bag Oxytocin-Sodium Chloride (PITOCIN) 30-0.9 UT/500ML-% infusion solution -  Dose: 2 cali-units/min ; Rate: 2 mL/hr ; Route: Intravenous ; Line: Peripheral IV 10/02/18 1249 Left Forearm ; Scheduled Time: 1345  Michaelle Portillo RN   13:47 Medication New Bag penicillin g 5 mu/100 mL 0.9% NS IVPB (mbp) -  Dose: 5 Million Units ; Route: Intravenous ; Line: Peripheral IV 10/02/18 1249 Left Forearm ; Scheduled Time: 1345  Michaelle Portillo RN   13:46 Other Flowsheet Documentation Other flowsheet entries   BP: 132/65 (Device Time: 13:45:48) Heart Rate: 77 (Device Time: 13:45:48)   Resp: 18     Michaelle Portillo RN   13:45:27 Orders Discontinued POC Glucose Q2H ; POC Glucose Q2H ; POC Glucose Q2H ; POC Glucose Q2H ; POC Glucose Q2H ; POC Glucose Q2H ; POC Glucose Q2H ; POC Glucose Q2H ; POC Glucose Q2H ; POC Glucose Q2H ; POC Glucose Q2H ; POC Glucose Q2H ; POC Glucose  Q2H  Michaelle Portillo RN   13:30 Other Flowsheet Documentation Uterine Activity Assessment   Contraction Frequency (Minutes): 3-5     Michaelle Portillo RN   13:04:50 Orders Placed Manual Differential  Xiomara Rees MD   13:01 Other Flowsheet Documentation Uterine Activity Assessment   Contraction Frequency (Minutes): 4-5     Michaelle Portillo RN   13:00 Other Flowsheet Documentation Other flowsheet entries   Temp: 98.1 °F (36.7 °C) Temp src: Temporal Art   SpO2: 100 %     Michaelle Portillo RN   12:59 Specimens Collected Type & Screen  - ID: [id number] Type: Blood   CBC Auto Differential  - ID: [id number] Type: Blood   Manual Differential  - ID: [id number] Type: Blood  Vianey Rothman RN   12:58:13 Orders Placed CBC Auto Differential  Xiomara Rees MD   12:58:05 Orders Placed ondansetron (ZOFRAN) injection 4 mg ; Oxytocin-Sodium Chloride (PITOCIN) 30-0.9 UT/500ML-% infusion solution ; terbutaline (BRETHINE) injection 0.25 mg ; Sod Citrate-Citric Acid (BICITRA) solution 30 mL  Xiomara Rees MD   12:58:04 Orders Placed VTE Prophylaxis Not Indicated: No Risk Factors (0); </= 3 (Low Risk) ; penicillin g 5 mu/100 mL 0.9% NS IVPB (mbp) ; penicillin G in iso-osmotic dextrose IVPB 3 million units (premix) ; butorphanol (STADOL) injection 1 mg ; butorphanol (STADOL) injection 2 mg ; ondansetron (ZOFRAN) tablet 4 mg ; ondansetron ODT (ZOFRAN-ODT) disintegrating tablet 4 mg  Xiomara Rees MD   12:58:03 Orders Placed Mini- prep prior to delivery ; Monitor fetal heart tones unless patient requests intermittent ; External uterine contraction monitoring ; Notify Physician (specified) ; Notify physician for tachysystole (per hospital algorithm) ; Notify physician if membranes ruptured, bleeding greater than 1 pad an hour, fetal heart tone abnormality, and severe pain ; Oxygen Therapy- ; NPO Diet NPO Except: Ice chips ; CBC & Differential ; Type & Screen ; lidocaine PF 1%  "(XYLOCAINE) injection 5 mL ; Insert Peripheral IV ; Saline Lock & Maintain IV Access ; sodium chloride 0.9 % flush 3-10 mL ; sodium chloride 0.9 % flush 3 mL ; lactated ringers bolus 1,000 mL ; lactated ringers infusion  Xiomara Rees MD   12:58:02 Orders Placed Admit To Obstetrics Inpatient ; Code Status and Medical Interventions: ; Obtain informed consent ; Vital Signs Per hospital policy ; Bed Rest with Bathroom Privileges ; Weigh patient daily ; POC Glucose Q2H ; Intake and Output ; Cervical Exam ; Initiate Group Beta Strep (GBS) Prophylaxis Protocol, If Criteria Met ; Position change ; Confirm presence of amniotic fluid ; Keep exams to a minimum on patients with SROM  Xiomara Rees MD   12:50 Medication New Bag lactated ringers infusion -  Dose: 125 mL/hr ; Rate: 125 mL/hr ; Route: Intravenous ; Line: Peripheral IV 10/02/18 1249 Left Forearm ; Scheduled Time: 1345  Michaelle Portillo RN   12:49 Peripheral IV 10/02/18 1249 Left Forearm Placed Placement Date/Time: 10/02/18 1249   Hand Hygiene Completed: Yes  Size (Gauge): 18 G  Orientation: Left  Location: Forearm  Site Prep: Chlorhexidine  Local Anesthetic: None  Technique: Anatomical landmarks  Inserted by: KIMMIE Orosco  Total insertion ...  Vianey Rothman RN   12:09 Patient Admitted to L&D  Beckie Parker   12:07 Patient Arrived in L&D  Cassi Matt RN       9/6/2018 Event Details User   11:53 Patient Discharged  Dori Amaral   11:40:34 Orders Placed Discharge patient  Xiomara Rees MD   11:39 Other Flowsheet Documentation Other flowsheet entries   Response: See orders Provider Name: dr rees   Notification Route: Phone call     Paloma Ervin RN   11:34:22 Orders Placed Initiate Observation Status  Xiomara Rees MD   11:16 Other Flowsheet Documentation Other flowsheet entries   BP: 128/77 Temp: 97.7 °F (36.5 °C)   Temp src: Temporal Art Heart Rate: 86   Resp: 18 SpO2: 99 %   Height: 161.5 cm (63.6\") " Weight: 102 kg (224 lb)   Pain Rating (0-10): Rest: 5 (pressure)     Paloma Ervin RN   10:58 Patient Arrived in L&D  Beckie Parker               Orders (last 72 hrs)     Start     Ordered    10/04/18 1120  Vital Signs Per Anesthesia Guidelines  Continuous     Comments:  Every 2 Minutes x5, Every 5 Minutes x4, then if Stable Every 15 Minutes    10/04/18 1120    10/04/18 1120  Start IV with #16 or #18 gauge angiocath.  Once      10/04/18 1120    10/04/18 1120  Cardiac Monitoring  Continuous      10/04/18 1120    10/04/18 1120  Fetal Heart Rate Monitor  Once      10/04/18 1120    10/04/18 1120  Nurse or anesthesiologist to remain with patient for 15 minutes following dosing.  Until Discontinued      10/04/18 1120    10/04/18 1120  Facilitate maternal postion on side and maintain uterine displacement.  Until Discontinued      10/04/18 1120    10/04/18 1120  Consult anesthesia services prior to changing epidural infusion/rate.  Until Discontinued      10/04/18 1120    10/04/18 1120  Notify physician for the following conditions:  Until Discontinued      10/04/18 1120    10/04/18 1119  ePHEDrine injection 5 mg  Every 5 Minutes PRN      10/04/18 1120    10/04/18 0836  Pt may have epidural  Misc Nursing Order (Specify)  Once     Comments:  Pt may have epidural    10/04/18 0835    10/03/18 2030  penicillin G in iso-osmotic dextrose IVPB 3 million units (premix)  Every 4 Hours      10/03/18 1942    10/03/18 2000  penicillin G potassium injection 3 Million Units  Every 4 Hours,   Status:  Discontinued      10/03/18 1936    10/03/18 1401  Turn pit off for 2 hours, patient may have Coke but no food  Nursing Communication  Once     Comments:  Turn pit off for 2 hours, patient may have Coke but no food    10/03/18 1400    10/03/18 1216  PAMG-1, Rapid Assay For ROM - Amniotic Fluid, Amniotic Sac  Once      10/03/18 1215    10/03/18 0251  PAMG-1, Rapid Assay For ROM - Amniotic Fluid, Amniotic Sac  Once      10/03/18 0250     10/03/18 0156  Tissue Pathology Exam  Once     Comments:  Gestational Age: 39w1d G1P0APGAR:1 Minute: 5 Minute:Maternal Complications: noneDelivery Complications:Maternal Medical History:Past Medical History:No date: AmenorrheaNo date: Benign neoplasm of pituitary gland (CMS/HCC)No date: Galactorrhea not associated with childbirth    Comment:  Galactorrhea due to non-obstetric cause   No date: Hyperprolactinemia (CMS/HCC)No date: HyperthyroidismNo date: Prolactinoma (CMS/HCC)      10/03/18 0155    10/03/18 0155  methylergonovine (METHERGINE) injection 200 mcg  Once As Needed      10/03/18 0155    10/03/18 0155  carboprost (HEMABATE) injection 250 mcg  As Needed      10/03/18 0155    10/03/18 0155  misoprostol (CYTOTEC) tablet 800 mcg  As Needed      10/03/18 0155    10/03/18 0015  POC Protein, Urine, Qualitative, Dipstick  Once      10/03/18 0231    10/03/18 0000  Herpes Simplex Virus Culture - Swab,     Comments:  This is an external result entered through the Results Console.      10/03/18 1940    10/02/18 1800  penicillin G in iso-osmotic dextrose IVPB 3 million units (premix)  Every 4 Hours      10/02/18 1258    10/02/18 1515  Oxytocin-Lactated Ringers (PITOCIN) 20 UNIT/L in lactated Ringer's 1000 mL IVPB  As Needed      10/02/18 1428    10/02/18 1508  acetaminophen (TYLENOL) tablet 1,000 mg  Every 6 Hours PRN      10/02/18 1508    10/02/18 1428  Oxytocin-Lactated Ringers (PITOCIN) 20 UNIT/L in lactated Ringer's 1000 mL IVPB  Once      10/02/18 1428    10/02/18 1400  POC Glucose Q2H  Every 2 Hours,   Status:  Canceled     Comments:  ONLY for diabetic patients      10/02/18 1258    10/02/18 1345  sodium chloride 0.9 % flush 3 mL  Every 12 Hours Scheduled      10/02/18 1258    10/02/18 1345  lactated ringers bolus 1,000 mL  Once      10/02/18 1258    10/02/18 1345  lactated ringers infusion  Continuous      10/02/18 1258    10/02/18 1345  penicillin g 5 mu/100 mL 0.9% NS IVPB (mbp)  Once      10/02/18 1258     10/02/18 1345  Oxytocin-Sodium Chloride (PITOCIN) 30-0.9 UT/500ML-% infusion solution  Titrated      10/02/18 1258    10/02/18 1305  Manual Differential  Once      10/02/18 1304    10/02/18 1300  Vital Signs Per hospital policy  Every Hour      10/02/18 1258    10/02/18 1256  Sod Citrate-Citric Acid (BICITRA) solution 30 mL  Once As Needed      10/02/18 1258    10/02/18 1256  terbutaline (BRETHINE) injection 0.25 mg  As Needed      10/02/18 1258    10/02/18 1255  ondansetron (ZOFRAN) tablet 4 mg  Every 6 Hours PRN      10/02/18 1258    10/02/18 1255  ondansetron ODT (ZOFRAN-ODT) disintegrating tablet 4 mg  Every 6 Hours PRN      10/02/18 1258    10/02/18 1255  ondansetron (ZOFRAN) injection 4 mg  Every 6 Hours PRN      10/02/18 1258    10/02/18 1255  butorphanol (STADOL) injection 2 mg  Every 3 Hours PRN      10/02/18 1258    10/02/18 1255  butorphanol (STADOL) injection 1 mg  Every 3 Hours PRN      10/02/18 1258    10/02/18 1255  Admit To Obstetrics Inpatient  Once      10/02/18 1258    10/02/18 1255  Code Status and Medical Interventions:  Continuous      10/02/18 1258    10/02/18 1255  Obtain informed consent  Once      10/02/18 1258    10/02/18 1255  Bed Rest with Bathroom Privileges  Once      10/02/18 1258    10/02/18 1255  Weigh patient daily  Until Discontinued      10/02/18 1258    10/02/18 1255  Intake and Output  Every Shift     Comments:  Every shift if on IV Tocolytics.    10/02/18 1258    10/02/18 1255  Cervical Exam  Once     Comments:  Unless contraindicated, every 1-2 hours in active labor, or at nurse's discretion.    10/02/18 1258    10/02/18 1255  Initiate Group Beta Strep (GBS) Prophylaxis Protocol, If Criteria Met  Continuous     Comments:  NO TREATMENT RECOMMENDED IF: 1)  Maternal GBS status known negative 2)  Scheduled  birth with intact membranes, not in labor.  3 ) Maternal GBS unknown, no risk factors.   TREAT WITH ANTIBIOTICS IF:  1)  Maternal GBS status is known postive.  2)   Maternal GBS status unknown with these risk factors:  a)  Previous infant affected by GBS infection.  b)  GBS urinary tract infection (UTI) or bacteruria during pregnancy  c)  Unexplained maternal fever in labor (greater than or equal to 100.4F or 38.0C)  d)  Prolonged rupture of the membranes greater than or equal to 18 hours.  e)  Gestational age less than 37 weeks.    10/02/18 1258    10/02/18 1255  Confirm presence of amniotic fluid  Once     Comments:  If patient present with SROM    10/02/18 1258    10/02/18 1255  Keep exams to a minimum on patients with SROM  Until Discontinued      10/02/18 1258    10/02/18 1255  Mini- prep prior to delivery  Once      10/02/18 1258    10/02/18 1255  Monitor fetal heart tones unless patient requests intermittent  Until Discontinued      10/02/18 1258    10/02/18 1255  External uterine contraction monitoring  Per Hospital Policy      10/02/18 1258    10/02/18 1255  Notify Physician (specified)  Until Discontinued      10/02/18 1258    10/02/18 1255  Notify physician for tachysystole (per hospital algorithm)  Until Discontinued      10/02/18 1258    10/02/18 1255  Notify physician if membranes ruptured, bleeding greater than 1 pad an hour, fetal heart tone abnormality, and severe pain  Until Discontinued      10/02/18 1258    10/02/18 1255  Oxygen Therapy-  Continuous     Comments:  For intra-uterine resuscitation for hypertonus, hypertstimulation, or non-reassuring fetal status    10/02/18 1258    10/02/18 1255  NPO Diet NPO Except: Ice chips  Diet Effective Now      10/02/18 1258    10/02/18 1255  CBC & Differential  Once      10/02/18 1258    10/02/18 1255  Type & Screen  Once      10/02/18 1258    10/02/18 1255  Insert Peripheral IV  Once      10/02/18 1258    10/02/18 1255  Saline Lock & Maintain IV Access  Continuous      10/02/18 1258    10/02/18 1255  VTE Prophylaxis Not Indicated: No Risk Factors (0); </= 3 (Low Risk)  Once      10/02/18 1258    10/02/18 1255  CBC  Auto Differential  PROCEDURE ONCE      10/02/18 1258    10/02/18 1254  lidocaine PF 1% (XYLOCAINE) injection 5 mL  As Needed      10/02/18 1258    10/02/18 1254  sodium chloride 0.9 % flush 3-10 mL  As Needed      10/02/18 1258    10/02/18 0000  Group B Streptococcus Culture - Swab, Vaginal/Rectum     Comments:  This is an external result entered through the Results Console.      10/02/18 1332    10/02/18 0000  Antibody Screen     Comments:  This is an external result entered through the Results Console.      10/02/18 1332    10/02/18 0000  ABO / Rh     Comments:  This is an external result entered through the Results Console.      10/02/18 1332    10/02/18 0000  Hepatitis B Surface Antigen     Comments:  This is an external result entered through the Results Console.      10/02/18 1332    10/02/18 0000  RPR     Comments:  This is an external result entered through the Results Console.      10/02/18 1332    10/02/18 0000  Rubella Antibody, IgG     Comments:  This is an external result entered through the Results Console.      10/02/18 1332    10/02/18 0000  Hepatitis C Antibody     Comments:  This is an external result entered through the Results Console.      10/02/18 2154    Unscheduled  Position change  As Needed     Comments:  For intra-uterine resuscitation for hypertonus, hypertstimulation, or non-reassuring fetal status    10/02/18 1258    Signed and Held  Vital Signs Per hospital policy  Per Hospital Policy      Signed and Held    Signed and Held  Up Ad Mary  Until Discontinued      Signed and Held    Signed and Held  Strict Intake and Output  Every Shift      Signed and Held    Signed and Held  Fundal and Lochia Check  Per Hospital Policy     Comments:  Q 15 min x 4, Q 30 min x 2, then Q Shift    Signed and Held    Signed and Held  Apply Ice to Perineum  As Needed      Signed and Held    Signed and Held  Bladder Assessment  As Needed      Signed and Held    Signed and Held  Indwelling Urinary Catheter  Once       Signed and Held    Signed and Held  Notify Physician (specified)  Until Discontinued      Signed and Held    Signed and Held  Diet Regular  Diet Effective Now      Signed and Held    Signed and Held  ibuprofen (ADVIL,MOTRIN) tablet 800 mg  Every 8 Hours PRN      Signed and Held    Signed and Held  ondansetron (ZOFRAN) tablet 4 mg  Every 6 Hours PRN      Signed and Held    Signed and Held  ondansetron ODT (ZOFRAN-ODT) disintegrating tablet 4 mg  Every 6 Hours PRN      Signed and Held    Signed and Held  ondansetron (ZOFRAN) injection 4 mg  Every 6 Hours PRN      Signed and Held    Signed and Held  promethazine (PHENERGAN) injection 12.5 mg  Every 6 Hours PRN      Signed and Held    Signed and Held  promethazine (PHENERGAN) injection 12.5 mg  Every 6 Hours PRN      Signed and Held    Signed and Held  promethazine (PHENERGAN) suppository 12.5 mg  Every 6 Hours PRN      Signed and Held    Signed and Held  promethazine (PHENERGAN) tablet 12.5 mg  Every 6 Hours PRN      Signed and Held    Signed and Held  oxyCODONE-acetaminophen (PERCOCET) 5-325 MG per tablet 1 tablet  Every 4 Hours PRN      Signed and Held    --  valACYclovir (VALTREX) 500 MG tablet  Daily      10/02/18 2340    Signed and Held  Nurse may remove epidural catheter after delivery.  Continuous      Signed and Held    Signed and Held  Transfer to Postpartum When Criteria Met  Until Discontinued      Signed and Held

## 2018-10-04 NOTE — ANESTHESIA PROCEDURE NOTES
Labor Epidural      Patient location during procedure: OB  Indication:at surgeon's request  Performed By  CRNA: GERARD TORRES  Preanesthetic Checklist  Completed: patient identified, site marked, surgical consent, pre-op evaluation, timeout performed, IV checked, risks and benefits discussed and monitors and equipment checked  Prep:  Pt Position:sitting  Sterile Tech:cap, gloves, mask and sterile barrier  Prep:chlorhexidine gluconate and isopropyl alcohol  Monitoring:blood pressure monitoring and continuous pulse oximetry  Epidural Block Procedure:  Approach:midline  Guidance:landmark technique  Location:L3-L4  Needle Type:Tuohy  Needle Gauge:18 G  Loss of Resistance Medium: saline  Loss of Resistance: 10cm  Cath Depth at skin:13 cm  Paresthesia: none  Aspiration:negative  Test Dose:negative  Post Assessment:  Dressing:occlusive dressing applied and secured with tape  Pt Tolerance:patient tolerated the procedure well with no apparent complications  Complications:no

## 2018-10-04 NOTE — PLAN OF CARE
Problem: Patient Care Overview  Goal: Individualization and Mutuality   10/04/18 1822   Individualization   Patient Specific Preferences vaginal delivery   Patient Specific Goals (Include Timeframe) epidural anesthesia

## 2018-10-04 NOTE — L&D DELIVERY NOTE
[facility]  Vaginal Delivery Note    Delivery     Delivery: Vaginal, Spontaneous Delivery     YOB: 2020    Time of Birth: 4:33 PM      Anesthesia: Epidural     Delivering clinician: Xiomara Rees    Forceps?   No   Vacuum? No    Shoulder dystocia present: No        Delivery narrative:      Infant    Findings: female  infant     Infant observations: Weight: 2780 g (6 lb 2.1 oz)   Length:    in  Observations/Comments:         Apgars:    @ 1 minute /       @ 5 minutes   Infant Name:      Placenta, Cord, and Fluid    Placenta delivered  Spontaneous  at   10/4/2018  4:36 PM     Cord: 3 vessels  present.   Nuchal Cord?  no   Cord blood obtained: Yes    Cord gases obtained:  No    Cord gas results: Venous:  No results found for: PHCVEN    Arterial:  No results found for: PHCART     Repair    Episiotomy: None    Lacerations: No   Estimated Blood Loss: Est. Blood Loss (mL): 300 mL (Filed from Delivery Summary) (10/04/18 6213)           Complications  Fetal gallstones    Disposition  Mother to Mother Baby/Postpartum  in stable condition currently.  Baby to remains with mom  in stable condition currently.      Xiomara Rees MD  10/04/18  4:48 PM

## 2018-10-04 NOTE — PROGRESS NOTES
Eran Garciaenport  : 1995  MRN: [id number]  CSN: [id number]    Labor progress note    Subjective   Induction for cord hematoma     Objective   Min/max vitals past 24 hours:  Temp  Min: 97.5 °F (36.4 °C)  Max: 99.3 °F (37.4 °C)   BP  Min: 117/97  Max: 180/90   Pulse  Min: 54  Max: 87   Resp  Min: 16  Max: 20        FHT's: reactive and category 1.  external monitors used   Cervix: was checked (by RN): 2 cm / 80 % / -3   Contractions: irregular      Assessment   1. IUP at 39w3d  2. Cord hematoma  3. Fetal gallstones  4. IUGR - resolved     Plan   1. Continue with induction    Xiomara Rees MD  10/4/2018  4:47 PM

## 2018-10-04 NOTE — ANESTHESIA PREPROCEDURE EVALUATION
Anesthesia Evaluation     NPO Solid Status: > 4 hours  NPO Liquid Status: > 4 hours           Airway   Mallampati: II  No difficulty expected  Dental      Pulmonary - normal exam   Cardiovascular - normal exam    (+) hypertension,       Neuro/Psych  GI/Hepatic/Renal/Endo    (+)   hyperthyroidism    Musculoskeletal     Abdominal  - normal exam   Substance History      OB/GYN    (+) Pregnant,         Other                        Anesthesia Plan    ASA 2     epidural     Anesthetic plan, all risks, benefits, and alternatives have been provided, discussed and informed consent has been obtained with: patient.

## 2018-10-05 LAB
HCT VFR BLD AUTO: 31.8 % (ref 37–47)
HGB BLD-MCNC: 10.9 G/DL (ref 12–16)

## 2018-10-05 PROCEDURE — 85014 HEMATOCRIT: CPT | Performed by: OBSTETRICS & GYNECOLOGY

## 2018-10-05 PROCEDURE — 85018 HEMOGLOBIN: CPT | Performed by: OBSTETRICS & GYNECOLOGY

## 2018-10-05 RX ORDER — PROMETHAZINE HYDROCHLORIDE 12.5 MG/1
12.5 SUPPOSITORY RECTAL EVERY 6 HOURS PRN
Status: DISCONTINUED | OUTPATIENT
Start: 2018-10-05 | End: 2018-10-06 | Stop reason: HOSPADM

## 2018-10-05 RX ORDER — ONDANSETRON 2 MG/ML
4 INJECTION INTRAMUSCULAR; INTRAVENOUS EVERY 6 HOURS PRN
Status: DISCONTINUED | OUTPATIENT
Start: 2018-10-05 | End: 2018-10-06 | Stop reason: HOSPADM

## 2018-10-05 RX ORDER — BISACODYL 10 MG
10 SUPPOSITORY, RECTAL RECTAL DAILY PRN
Status: DISCONTINUED | OUTPATIENT
Start: 2018-10-05 | End: 2018-10-06 | Stop reason: HOSPADM

## 2018-10-05 RX ORDER — OXYCODONE HYDROCHLORIDE AND ACETAMINOPHEN 5; 325 MG/1; MG/1
1 TABLET ORAL EVERY 6 HOURS PRN
Status: DISCONTINUED | OUTPATIENT
Start: 2018-10-05 | End: 2018-10-06 | Stop reason: HOSPADM

## 2018-10-05 RX ORDER — PRENATAL VIT/IRON FUM/FOLIC AC 27MG-0.8MG
1 TABLET ORAL DAILY
Status: DISCONTINUED | OUTPATIENT
Start: 2018-10-05 | End: 2018-10-06 | Stop reason: HOSPADM

## 2018-10-05 RX ORDER — MISOPROSTOL 200 UG/1
600 TABLET ORAL ONCE AS NEEDED
Status: DISCONTINUED | OUTPATIENT
Start: 2018-10-05 | End: 2018-10-06 | Stop reason: HOSPADM

## 2018-10-05 RX ORDER — ONDANSETRON 4 MG/1
4 TABLET, ORALLY DISINTEGRATING ORAL EVERY 6 HOURS PRN
Status: DISCONTINUED | OUTPATIENT
Start: 2018-10-05 | End: 2018-10-06 | Stop reason: HOSPADM

## 2018-10-05 RX ORDER — ONDANSETRON 4 MG/1
4 TABLET, FILM COATED ORAL EVERY 6 HOURS PRN
Status: DISCONTINUED | OUTPATIENT
Start: 2018-10-05 | End: 2018-10-06 | Stop reason: HOSPADM

## 2018-10-05 RX ORDER — DOCUSATE SODIUM 100 MG/1
100 CAPSULE, LIQUID FILLED ORAL 2 TIMES DAILY PRN
Status: DISCONTINUED | OUTPATIENT
Start: 2018-10-05 | End: 2018-10-06 | Stop reason: HOSPADM

## 2018-10-05 RX ORDER — METHYLERGONOVINE MALEATE 0.2 MG/ML
200 INJECTION INTRAVENOUS ONCE AS NEEDED
Status: DISCONTINUED | OUTPATIENT
Start: 2018-10-05 | End: 2018-10-06 | Stop reason: HOSPADM

## 2018-10-05 RX ORDER — CARBOPROST TROMETHAMINE 250 UG/ML
250 INJECTION, SOLUTION INTRAMUSCULAR ONCE AS NEEDED
Status: DISCONTINUED | OUTPATIENT
Start: 2018-10-05 | End: 2018-10-06 | Stop reason: HOSPADM

## 2018-10-05 RX ORDER — PROMETHAZINE HYDROCHLORIDE 25 MG/1
25 TABLET ORAL EVERY 6 HOURS PRN
Status: DISCONTINUED | OUTPATIENT
Start: 2018-10-05 | End: 2018-10-06 | Stop reason: HOSPADM

## 2018-10-05 RX ORDER — SODIUM CHLORIDE 0.9 % (FLUSH) 0.9 %
1-10 SYRINGE (ML) INJECTION AS NEEDED
Status: DISCONTINUED | OUTPATIENT
Start: 2018-10-05 | End: 2018-10-06 | Stop reason: HOSPADM

## 2018-10-05 RX ORDER — PROMETHAZINE HYDROCHLORIDE 25 MG/ML
12.5 INJECTION, SOLUTION INTRAMUSCULAR; INTRAVENOUS EVERY 6 HOURS PRN
Status: DISCONTINUED | OUTPATIENT
Start: 2018-10-05 | End: 2018-10-06 | Stop reason: HOSPADM

## 2018-10-05 RX ADMIN — OXYCODONE HYDROCHLORIDE AND ACETAMINOPHEN 1 TABLET: 5; 325 TABLET ORAL at 12:50

## 2018-10-05 RX ADMIN — IBUPROFEN 800 MG: 800 TABLET ORAL at 20:16

## 2018-10-05 RX ADMIN — IBUPROFEN 800 MG: 800 TABLET ORAL at 09:50

## 2018-10-05 RX ADMIN — IBUPROFEN 800 MG: 800 TABLET ORAL at 01:57

## 2018-10-05 RX ADMIN — PRENATAL VIT W/ FE FUMARATE-FA TAB 27-0.8 MG 1 TABLET: 27-0.8 TAB at 09:50

## 2018-10-05 NOTE — PLAN OF CARE
Problem: Patient Care Overview  Goal: Plan of Care Review  Outcome: Ongoing (interventions implemented as appropriate)   10/05/18 0339   Coping/Psychosocial   Plan of Care Reviewed With patient   Plan of Care Review   Progress improving   OTHER   Outcome Summary vss, ambulating in room, voiding, IVF infusing, FFMLU1, scant bleeding, dermoplast and icepacks provided for comfort. Motrin for low back pain. Breastfeeding with use of shield, pumping, and supplementing. Teaching completed, bonding with infant.      Goal: Individualization and Mutuality  Outcome: Ongoing (interventions implemented as appropriate)   10/05/18 0339   Individualization   Patient Specific Preferences motrin for pain control   Patient Specific Goals (Include Timeframe) pain control    Patient Specific Interventions motrin given as needed        Problem: Postpartum (Vaginal Delivery) (Adult,Obstetrics,Pediatric)  Goal: Signs and Symptoms of Listed Potential Problems Will be Absent, Minimized or Managed (Postpartum)  Outcome: Ongoing (interventions implemented as appropriate)   10/05/18 0339   Goal/Outcome Evaluation   Problems Assessed (Postpartum Vaginal Delivery) all   Problems Present (Postpartum Vag Deliv) pain       Problem: Hypertensive Disorders in Pregnancy (Adult,Obstetrics,Pediatric)  Goal: Signs and Symptoms of Listed Potential Problems Will be Absent, Minimized or Managed (Hypertensive Disorders in Pregnancy)  Outcome: Ongoing (interventions implemented as appropriate)   10/05/18 0339   Goal/Outcome Evaluation   Problems Assessed (Hypertensive Disorders in Pregnancy) all   Problems Present (Hypertensive/in PG) none       Problem: Breastfeeding (Adult,Obstetrics,Pediatric)  Goal: Signs and Symptoms of Listed Potential Problems Will be Absent, Minimized or Managed (Breastfeeding)  Outcome: Ongoing (interventions implemented as appropriate)   10/05/18 0339   Goal/Outcome Evaluation   Problems Assessed (Breastfeeding) all   Problems  Present (Breastfeeding) situational response

## 2018-10-05 NOTE — PLAN OF CARE
Problem: Patient Care Overview  Goal: Plan of Care Review  Outcome: Ongoing (interventions implemented as appropriate)  Tolerating pain with po pain medication and comfort products. Bonding well. No clots. Fundus firm. Not put baby to breast today. Using formula. Pumping.   10/05/18 3328   Coping/Psychosocial   Plan of Care Reviewed With patient;significant other   Plan of Care Review   Progress improving     Goal: Individualization and Mutuality  Outcome: Ongoing (interventions implemented as appropriate)    Goal: Discharge Needs Assessment  Outcome: Ongoing (interventions implemented as appropriate)    Goal: Interprofessional Rounds/Family Conf  Outcome: Ongoing (interventions implemented as appropriate)      Problem: Postpartum (Vaginal Delivery) (Adult,Obstetrics,Pediatric)  Goal: Signs and Symptoms of Listed Potential Problems Will be Absent, Minimized or Managed (Postpartum)  Outcome: Ongoing (interventions implemented as appropriate)      Problem: Hypertensive Disorders in Pregnancy (Adult,Obstetrics,Pediatric)  Goal: Signs and Symptoms of Listed Potential Problems Will be Absent, Minimized or Managed (Hypertensive Disorders in Pregnancy)  Outcome: Ongoing (interventions implemented as appropriate)      Problem: Breastfeeding (Adult,Obstetrics,Pediatric)  Goal: Signs and Symptoms of Listed Potential Problems Will be Absent, Minimized or Managed (Breastfeeding)  Outcome: Ongoing (interventions implemented as appropriate)

## 2018-10-05 NOTE — PROGRESS NOTES
"[facility]  Vaginal Delivery Progress Note    Subjective   Postpartum Day 1: Vaginal Delivery    The patient feels well.  Her pain is well controlled with prescribed pain medications.   She is ambulating well.  Patient describes her bleeding as moderate lochia.    Breastfeeding: declines.    Objective     Vital Signs Range for the last 24 hours  Temperature: Temp:  [97.1 °F (36.2 °C)-98.6 °F (37 °C)] 98 °F (36.7 °C)   Temp Source: Temp src: Oral   BP: BP: (116-175)/() 145/79   Pulse: Heart Rate:  [] 59   Respirations: Resp:  [16-20] 20   SPO2: SpO2:  [97 %-98 %] 97 %   O2 Amount (l/min):     O2 Devices Device (Oxygen Therapy): room air   Weight:       Admit Height:  Height: 161.5 cm (63.58\")      Physical Exam:  General:  no acute distresss.  Abdomen: Fundus: appropriate, firm, non tender  Extremities: normal, atraumatic, no cyanosis, and trace edema.     Lab results reviewed:  Yes   Rubella:  No results found for: RUBELLAIGGIN Nurse Transcribed from prenatal record --  No components found for: EXTRUBELQUAL  Rh Status:    RH type   Date Value Ref Range Status   10/02/2018 Positive  Final     Immunizations: There is no immunization history for the selected administration types on file for this patient.    Assessment/Plan     Active Problems:    * No active hospital problems. *      Dada Garcia is Day 1  post-partum  Vaginal, Spontaneous Delivery    .      Plan:  Continue current care.      Xiomara Rees MD  10/5/2018  11:44 AM  "

## 2018-10-05 NOTE — LACTATION NOTE
39 3/7 week infant, Rocío, delivered 10/4/18 @ 1633. Birth weight 6-2.1 (2780 g). Assisted with positioning and latching infant, but she had difficulty staying latching. Mother's nipples protractile with stimulation. Small nipple shield used and infant was able to stay latched and nursed for 20 minutes on left and 10 on right. Reviewed initial breastfeeding teaching again. Family present and supportive.     Maternal Hx: , Amenorrhea, Galactorrhea not associated with childbirth, hyperprolactinemia, Prolactinoma  Prenatal Meds:PNV  Pump: Rx requested

## 2018-10-05 NOTE — PAYOR COMM NOTE
"  [facility]   [phone]  Or  Fax  [phone]    Ref:[id number] delivery information     Shira Adler  (22 y.o. Female)     Date of Birth Social Security Number Address Home Phone MRN    1995  [address] [facility] [address] [phone] [id number]    [employer] Status          None Single       Admission Date Admission Type Admitting Provider Attending Provider Department, Room/Bed    10/2/18 Elective Xiomara Rees MD Mueller, Susan Kathleen, MD [facility] MOTHER BABY 2A, M205/1    Discharge Date Discharge Disposition Discharge Destination                       Attending Provider:  Xiomara Rees MD    Allergies:  No Known Allergies    Isolation:  None   Infection:  None   Code Status:  CPR    Ht:  161.5 cm (63.58\")   Wt:  102 kg (224 lb)    Admission Cmt:  None   Principal Problem:  None                Active Insurance as of 10/2/2018     Primary Coverage     Payor Plan Insurance Group Employer/Plan Group    [employer] MED[employer]ID [facility] [employer] MEDICAID [id number]     Payor Plan Address Payor Plan Phone Number Effective From Effective To    [address] [address] [phone] 6/1/2016     [facility] [address]       Subscriber Name Subscriber Birth Date Member ID       SHIRA ADLER 1995 [id number]                 Emergency Contacts      (Rel.) Home Phone Work Phone Mobile Phone    Michelle Zarco (Mother) -- -- [phone]    Ayo Ricks (Other) [phone] -- --          , Xiomara Cruz MD Physician Signed Obstetrics  L&D Delivery Note Date of Service: 10/4/2018  4:48 PM           [facility]  Vaginal Delivery Note     Delivery      Delivery: Vaginal, Spontaneous Delivery     YOB: 2020    Time of Birth: 4:33 PM       Anesthesia: Epidural     Delivering clinician: Xiomara Rees    Forceps?   No   Vacuum? No    Shoulder dystocia present: No                   Delivery " "narrative:       Infant     Findings: female  infant      Infant observations: Weight: 2780 g (6 lb 2.1 oz)   Length:    in  Observations/Comments:         Apgars:    @ 1 minute /  8        @ 5 minutes / 9   Infant Name:              Placenta, Cord, and Fluid      Placenta delivered  Spontaneous  at   10/4/2018  4:36 PM      Cord: 3 vessels  present.   Nuchal Cord?  no    Cord blood obtained: Yes     Cord gases obtained:          No     Cord gas results: Venous:  No results found for: PHCVEN      Arterial:  No results found for: PHCART       Repair     Episiotomy: None    Lacerations: No   Estimated Blood Loss: Est. Blood Loss (mL): 300 mL (Filed from Delivery Summary) (10/04/18 1036)               Complications  Fetal gallstones     Disposition  Mother to Mother Baby/Postpartum  in stable condition currently.  Baby to remains with mom  in stable condition currently.        Xiomara Rees MD  10/04/18  4:48 PM                      ICU Vital Signs     Row Name 10/05/18 0500 10/04/18 2340 10/04/18 2315 10/04/18 2200 10/04/18 2115       Height and Weight    Height  --  -- 161.5 cm (63.58\")  --  --    Ideal Body Weight (IBW) (kg)  --  -- 54.05  --  --    Weight in (lb) to have BMI = 25  --  -- 143.5  --  --       Vitals    Temp 98.6 °F (37 °C)  -- 98.2 °F (36.8 °C)  --  --    Temp src Temporal Artery   -- Temporal Artery   --  --    Pulse 61  -- 75 88 59    Heart Rate Source Monitor  -- Monitor  --  --    Resp 16  -- 16  --  --    Resp Rate Source Visual  -- Visual  --  --    /72  -- 132/72 122/67 125/80    Noninvasive MAP (mmHg) 86  --  --  --  --    BP Location Right arm  -- Right arm  --  --    BP Method  --  -- Automatic  --  --    Patient Position  --  -- Sitting  --  --       Oxygen Therapy    SpO2 98 %  -- 98 %  --  --    Pulse Oximetry Type  --  -- Intermittent  --  --    Device (Oxygen Therapy) room air room air room air  --  --    Row Name 10/04/18 2045 10/04/18 2030 10/04/18 1930 10/04/18 " 1900 10/04/18 1848       Vitals    Temp  --  -- 98.2 °F (36.8 °C)  --  --    Temp src  --  -- Oral  --  --    Pulse 74 84 53 58 63    Heart Rate Source  --  --  --  -- Monitor    Resp  --  --  --  -- 18    Resp Rate Source  --  --  --  -- Visual    /72 134/73 156/71 140/79 134/69    BP Location  --  --  --  -- Right arm    BP Method  --  --  --  -- Automatic    Patient Position  --  --  --  -- Lying    Row Name 10/04/18 1831 10/04/18 1817 10/04/18 1810 10/04/18 1747 10/04/18 1732       Vitals    Pulse  -- 58 56 53 60    Heart Rate Source  -- Monitor Monitor Monitor Monitor    Resp  -- 18 18  -- 18    Resp Rate Source  -- Visual Visual  -- Visual    BP (!)  133/104 125/68 135/63 162/78 167/74    BP Location Right arm Right arm Right arm Right arm Right arm    BP Method Automatic Automatic Automatic Automatic Automatic    Patient Position Lying Lying Lying Lying Lying    Row Name 10/04/18 1720 10/04/18 1715 10/04/18 1702 10/04/18 1647 10/04/18 1645       Vitals    Temp  --  -- 98.1 °F (36.7 °C)  -- 97.1 °F (36.2 °C)    Temp src  --  -- Oral  -- Oral    Pulse 66 71 73 102  --    Heart Rate Source Monitor  -- Monitor  -- Monitor    Resp 18  -- 18  -- 18    Resp Rate Source Visual  -- Visual  -- Visual    /81 125/100 151/97 154/70  --    BP Location Right arm  -- Right arm  -- Right arm    BP Method Automatic  -- Automatic  -- Automatic    Patient Position Lying  -- Lying  -- Lying    Row Name 10/04/18 1417 10/04/18 1402 10/04/18 1347 10/04/18 1317 10/04/18 1302       Vitals    Pulse 63 74 66 59 63    Heart Rate Source Monitor  --  --  -- Monitor    Resp 18  --  --  -- 18    Resp Rate Source Visual  --  --  -- Visual    /72 172/99 161/83 175/95 167/75    BP Location Right arm  --  --  -- Right arm    BP Method Automatic  --  --  -- Automatic    Patient Position Lying  --  --  -- Lying    Row Name 10/04/18 1247 10/04/18 1232 10/04/18 1217 10/04/18 1202 10/04/18 1147       Vitals    Pulse 56 65 61 73 75     Heart Rate Source  --  -- Monitor Monitor Monitor    Resp  --  -- 18 16 16    Resp Rate Source  --  -- Visual Visual Visual    /74 158/74 161/77 163/93 146/79    BP Location  --  -- Right arm Left arm Left arm    BP Method  --  -- Automatic Automatic Automatic    Patient Position  --  -- Lying Lying Lying    Row Name 10/04/18 1137 10/04/18 1136 10/04/18 1131 10/04/18 1126 10/04/18 1121       Vitals    Temp 99.3 °F (37.4 °C)  --  --  --  --    Pulse 72 79 73 82 79    Heart Rate Source Monitor  -- Monitor  --  --    Resp 16  --  --  --  --    Resp Rate Source Visual  --  --  --  --    /78  -- (!)  152/102  --  --    BP Location Left arm  -- Right arm  --  --    BP Method Automatic  -- Automatic  --  --    Patient Position Lying  -- Lying  --  --       Oxygen Therapy    SpO2  -- 99 % 99 % 97 % 98 %    Pulse Oximetry Type  --  -- Continuous  --  --    Device (Oxygen Therapy)  --  -- room air  --  --    Row Name 10/04/18 1117 10/04/18 1116 10/04/18 1111 10/04/18 1106 10/04/18 1102       Vitals    Pulse 81 82 77 75 71    Heart Rate Source Monitor  --  --  -- Monitor    Resp 18  --  --  -- 18    Resp Rate Source Visual  --  --  -- Visual    /81  --  --  -- 178/83    BP Location Right arm  --  --  -- Right arm    BP Method Automatic  --  --  -- Automatic    Patient Position Lying  --  --  -- Lying       Oxygen Therapy    SpO2  -- 97 % 98 % 98 %  --    Row Name 10/04/18 1101 10/04/18 1056 10/04/18 1051 10/04/18 1047 10/04/18 1046       Vitals    Pulse 80 73 75 68 70    Heart Rate Source  --  --  -- Monitor  --    Resp  --  --  -- 18  --    Resp Rate Source  --  --  -- Visual  --    BP  --  --  -- 169/94  --    BP Location  --  --  -- Right arm  --    BP Method  --  --  -- Automatic  --    Patient Position  --  --  -- Lying  --       Oxygen Therapy    SpO2 98 % 99 % 99 %  -- 99 %    Pulse Oximetry Type Continuous  --  --  --  --    Device (Oxygen Therapy) room air  --  --  --  --    Row Name 10/04/18 1041  10/04/18 1036 10/04/18 1032 10/04/18 1031 10/04/18 1026       Vitals    Pulse 72 82 61 66 77    Heart Rate Source  --  -- Monitor  --  --    Resp  --  -- 18  --  --    Resp Rate Source  --  -- Visual  --  --    BP  --  -- 157/80  --  --    BP Location  --  -- Right arm  --  --    BP Method  --  -- Automatic  --  --    Patient Position  --  -- Lying  --  --       Oxygen Therapy    SpO2 99 % 99 %  -- 99 % 100 %    Row Name 10/04/18 1021 10/04/18 1016 10/04/18 1011 10/04/18 1006 10/04/18 1002       Vitals    Pulse 77 69 71 70 74    Heart Rate Source  --  --  --  -- Monitor    Resp  --  --  --  -- 18    Resp Rate Source  --  --  --  -- Visual    BP  -- (!)  158/102  --  -- 149/81    BP Location  --  --  --  -- Right arm    BP Method  --  --  --  -- Automatic    Patient Position  --  --  --  -- Lying       Oxygen Therapy    SpO2 100 % 99 % 99 % 99 %  --    Row Name 10/04/18 1001 10/04/18 0956 10/04/18 0951 10/04/18 0946 10/04/18 0941       Vitals    Pulse 78 77 71 82 68    BP  --  --  -- 167/83 143/75       Oxygen Therapy    SpO2 100 % 100 % 100 % 100 % 99 %    Row Name 10/04/18 0940 10/04/18 0937 10/04/18 0936 10/04/18 0935 10/04/18 0933       Vitals    Pulse 70 73 65 64 70    Heart Rate Source  --  --  -- Monitor  --    Resp  --  --  -- 18  --    Resp Rate Source  --  --  -- Visual  --    /64 152/75  -- 140/62 146/68    BP Location  --  --  -- Right arm  --    BP Method  --  --  -- Automatic  --    Patient Position  --  --  -- Lying  --       Oxygen Therapy    SpO2  --  -- 100 %  --  --    Row Name 10/04/18 0932 10/04/18 0931 10/04/18 0929 10/04/18 0928 10/04/18 0926       Vitals    Temp  --  -- 97.5 °F (36.4 °C)  --  --    Temp src  --  -- Oral  --  --    Pulse 72 82 79 79 86    Heart Rate Source  -- Monitor Monitor  --  --    Resp  --  -- 16  --  --    Resp Rate Source  --  -- Visual  --  --    /62  -- 146/66 151/63  --    BP Location  --  -- Right arm  --  --    BP Method  --  -- Automatic  --  --     Patient Position  --  -- Lying  --  --       Oxygen Therapy    SpO2  -- 100 %  --  -- 100 %    Pulse Oximetry Type  -- Continuous  --  --  --    Device (Oxygen Therapy)  -- room air  --  --  --    Row Name 10/04/18 0925 10/04/18 0923 10/04/18 0921 10/04/18 0919 10/04/18 0917       Vitals    Pulse 86 87 84 84 87    Heart Rate Source  --  --  --  -- Monitor    Resp  --  --  --  -- 18    Resp Rate Source  --  --  --  -- Visual    /80 138/77 139/75 137/75 130/75    BP Location  --  --  --  -- Right arm    BP Method  --  --  --  -- Automatic    Patient Position  --  --  --  -- Lying       Oxygen Therapy    SpO2  --  -- 100 %  --  --    Row Name 10/04/18 0916 10/04/18 0915 10/04/18 0913 10/04/18 0911 10/04/18 0831       Vitals    Pulse 79 80 80 79 86    Heart Rate Source  --  -- Monitor Monitor  --    Resp  --  --  -- 18  --    Resp Rate Source  --  --  -- Visual  --    BP  -- 135/78 (!)  127/108 147/68 156/80    BP Location  --  -- Right arm Right arm  --    BP Method  --  -- Automatic Automatic  --    Patient Position  --  -- Sitting Sitting  --       Oxygen Therapy    SpO2 100 %  --  -- 100 %  --    Pulse Oximetry Type  --  --  -- Continuous  --    Device (Oxygen Therapy)  --  --  -- room air  --    Row Name 10/04/18 0801 10/04/18 0800 10/04/18 0731 10/04/18 0730          Vitals    Temp  --  --  -- 98.2 °F (36.8 °C)     Temp src  --  --  -- Temporal Artery      Pulse 65  -- 77 80     Heart Rate Source Monitor  --  -- Monitor     Resp  --  --  -- 20     /84  -- 140/71 148/66     BP Location Right arm  --  -- Right arm     BP Method Automatic  --  -- Automatic     Patient Position Lying  --  -- Lying        Oxygen Therapy    Device (Oxygen Therapy)  -- room air  --  --

## 2018-10-06 VITALS
RESPIRATION RATE: 16 BRPM | SYSTOLIC BLOOD PRESSURE: 138 MMHG | WEIGHT: 224 LBS | HEART RATE: 72 BPM | DIASTOLIC BLOOD PRESSURE: 79 MMHG | OXYGEN SATURATION: 100 % | HEIGHT: 64 IN | TEMPERATURE: 97.7 F | BODY MASS INDEX: 38.24 KG/M2

## 2018-10-06 RX ORDER — IBUPROFEN 800 MG/1
800 TABLET ORAL EVERY 8 HOURS PRN
Qty: 90 TABLET | Refills: 2 | OUTPATIENT
Start: 2018-10-06 | End: 2019-07-06

## 2018-10-06 RX ORDER — OXYCODONE HYDROCHLORIDE AND ACETAMINOPHEN 5; 325 MG/1; MG/1
1 TABLET ORAL EVERY 6 HOURS PRN
Qty: 10 TABLET | Refills: 0 | Status: SHIPPED | OUTPATIENT
Start: 2018-10-06 | End: 2018-10-15

## 2018-10-06 RX ADMIN — DOCUSATE SODIUM 100 MG: 100 CAPSULE ORAL at 05:26

## 2018-10-06 RX ADMIN — OXYCODONE HYDROCHLORIDE AND ACETAMINOPHEN 1 TABLET: 5; 325 TABLET ORAL at 05:27

## 2018-10-06 RX ADMIN — OXYCODONE HYDROCHLORIDE AND ACETAMINOPHEN 1 TABLET: 5; 325 TABLET ORAL at 17:12

## 2018-10-06 RX ADMIN — Medication 10 ML: at 09:21

## 2018-10-06 RX ADMIN — IBUPROFEN 800 MG: 800 TABLET ORAL at 05:26

## 2018-10-06 RX ADMIN — PRENATAL VIT W/ FE FUMARATE-FA TAB 27-0.8 MG 1 TABLET: 27-0.8 TAB at 09:23

## 2018-10-06 NOTE — DISCHARGE SUMMARY
Discharge Summary     Eran Garcia  : 1995  MRN: [id number]  [facility]: [id number]    Date of Admission: 10/2/2018   Date of Discharge:  10/6/2018   Delivering Physician: Xiomara Rees        Admission Diagnosis: 1. Normal labor [O80, Z37.9]  2. Normal delivery [O80]   Discharge Diagnosis: 1. Pregnancy at 39w3d - delivered       Procedures: 10/4/2018  - Vaginal, Spontaneous Delivery       Hospital Course  Patient is a 22 y.o.  who at 39w3d had a uncomplicated vaginal delivery.  Her postpartum course was without complications.  On PPD #2 she was ready for discharge.  She had normal lochia and pain was well controlled with oral medications.    Infant  female  fetus weighing 2780 g (6 lb 2.1 oz)   Apgars -  8  @ 1 minute /  9  @ 5 minutes.    Discharge labs  Lab Results   Component Value Date    WBC 10.42 10/02/2018    HGB 10.9 (L) 10/05/2018    HCT 31.8 (L) 10/05/2018     10/02/2018       Discharge Medications     Discharge Medications      New Medications      Instructions Start Date   ibuprofen 800 MG tablet  Commonly known as:  ADVIL,MOTRIN   800 mg, Oral, Every 8 Hours PRN      oxyCODONE-acetaminophen 5-325 MG per tablet  Commonly known as:  PERCOCET   1 tablet, Oral, Every 6 Hours PRN         Continue These Medications      Instructions Start Date   prenatal (CLASSIC) vitamin 28-0.8 MG tablet tablet   Oral, Daily         Stop These Medications    valACYclovir 500 MG tablet  Commonly known as:  VALTREX            Discharge Disposition Home or Self Care   Condition on Discharge: good   Follow-up: 4 weeks with Dr. Rees, if no appointment, please call office     Xiomara Rees MD  10/6/2018

## 2018-10-06 NOTE — PROGRESS NOTES
"[facility]  Vaginal Delivery Progress Note    Subjective   Postpartum Day 2: Vaginal Delivery    The patient feels well.  Her pain is well controlled with prescribed pain medications.   She is ambulating well.  Patient describes her bleeding as thin lochia.    Breastfeeding: declines.    Objective     Vital Signs Range for the last 24 hours  Temperature: Temp:  [97.6 °F (36.4 °C)-98.5 °F (36.9 °C)] 97.7 °F (36.5 °C)   Temp Source: Temp src: Temporal Artery    BP: BP: (138-147)/(79-94) 138/79   Pulse: Heart Rate:  [57-76] 72   Respirations: Resp:  [16-18] 16   SPO2: SpO2:  [96 %-100 %] 100 %   O2 Amount (l/min):     O2 Devices Device (Oxygen Therapy): room air   Weight: Weight:  [102 kg (224 lb)] 102 kg (224 lb)     Admit Height:  Height: 161.5 cm (63.58\")      Physical Exam:  General:  no acute distresss.  Abdomen: Fundus: appropriate, firm, non tender  Extremities: normal, atraumatic, no cyanosis, and trace edema.     Lab results reviewed:  Yes   Rubella:  No results found for: RUBELLAIGGIN Nurse Transcribed from prenatal record --  No components found for: EXTRUBELQUAL  Rh Status:  No results found for: RH  Immunizations: There is no immunization history for the selected administration types on file for this patient.    Assessment/Plan     Active Problems:    * No active hospital problems. *      Dada Garcia is Day 2  post-partum  Vaginal, Spontaneous Delivery    .      Plan:  Discharge home with standard precautions and return to clinic in 4-6 weeks.      Xiomara Rees MD  10/6/2018  11:19 AM  "

## 2018-10-06 NOTE — PROGRESS NOTES
Continued Stay Note  TANYA Barillas     Patient Name: Dada Garcia  MRN: [id number]  Today's Date: 10/6/2018    Admit Date: 10/2/2018          Discharge Plan     Row Name 10/06/18 1143       Plan    Patient/Family in Agreement with Plan yes    Final Discharge Disposition Code 01 - home or self-care    Final Note SW spoke to pt about PPSD of 10.  She denies feeling depressed.  She does not want counseling or any services.  Pt will dc today.  MORELIA Carter.              Discharge Codes    No documentation.       Expected Discharge Date and Time     Expected Discharge Date Expected Discharge Time    Oct 6, 2018             MORELIA Stark

## 2018-10-07 NOTE — PAYOR COMM NOTE
"[facility] 10[facility]    [id number]  UR PHONE    [phone]  Shira Adler  (22 y.o. Female)     Date of Birth Social Security Number Address Home Phone MRN    1995  [address] [facility] [address] [phone] [id number]    Restorationist Marital Status          None Single       Admission Date Admission Type Admitting Provider Attending Provider Department, Room/Bed    10/2/18 Elective Xiomara Rees MD  [facility] MOTHER BABY 2A, M205/    Discharge Date Discharge Disposition Discharge Destination        10/6/2018 Home or Self Care              Attending Provider:  (none)   Allergies:  No Known Allergies    Isolation:  None   Infection:  None   Code Status:  Prior    Ht:  161.5 cm (63.58\")   Wt:  102 kg (224 lb)    Admission Cmt:  None   Principal Problem:  None                Active Insurance as of 10/2/2018     Primary Coverage     Payor Plan Insurance Group Employer/Plan Group    [facility] MEDICAID [facility] MEDICAID [id number]     Payor Plan Address Payor Plan Phone Number Effective From Effective To    [address] [address] [phone] 2016     [facility] [id number]       Subscriber Name Subscriber Birth Date Member ID       SHIRA ADLER 1995 [id number]                 Emergency Contacts      (Rel.) Home Phone Work Phone Mobile Phone    Michelle Zarco (Mother) -- -- [phone]    Ayo Ricks (Other) [phone] -- --               Discharge Summary      Xiomara Rees MD at 10/6/2018 11:22 AM          Discharge Summary     Eran Adler  : 1995  MRN: [id number]  CSN: [id number]    Date of Admission: 10/2/2018   Date of Discharge:  10/6/2018   Delivering Physician: Xiomara Rees        Admission Diagnosis: 1. Normal labor [O80, Z37.9]  2. Normal delivery [O80]   Discharge Diagnosis: 1. Pregnancy at 39w3d - delivered       Procedures: 10/4/2018  - Vaginal, Spontaneous Delivery   "     Hospital Course  Patient is a 22 y.o.  who at 39w3d had a uncomplicated vaginal delivery.  Her postpartum course was without complications.  On PPD #2 she was ready for discharge.  She had normal lochia and pain was well controlled with oral medications.    Infant  female  fetus weighing 2780 g (6 lb 2.1 oz)   Apgars -  8  @ 1 minute /  9  @ 5 minutes.    Discharge labs  Lab Results   Component Value Date    WBC 10.42 10/02/2018    HGB 10.9 (L) 10/05/2018    HCT 31.8 (L) 10/05/2018     10/02/2018       Discharge Medications     Discharge Medications      New Medications      Instructions Start Date   ibuprofen 800 MG tablet  Commonly known as:  ADVIL,MOTRIN   800 mg, Oral, Every 8 Hours PRN      oxyCODONE-acetaminophen 5-325 MG per tablet  Commonly known as:  PERCOCET   1 tablet, Oral, Every 6 Hours PRN         Continue These Medications      Instructions Start Date   prenatal (CLASSIC) vitamin 28-0.8 MG tablet tablet   Oral, Daily         Stop These Medications    valACYclovir 500 MG tablet  Commonly known as:  VALTREX            Discharge Disposition Home or Self Care   Condition on Discharge: good   Follow-up: 4 weeks with Dr. Rees, if no appointment, please call office     Xiomara Rees MD  10/6/2018    Electronically signed by Xiomara Rees MD at 10/6/2018 11:22 AM

## 2018-10-08 LAB
CYTO UR: NORMAL
LAB AP CASE REPORT: NORMAL
LAB AP CLINICAL INFORMATION: NORMAL
PATH REPORT.FINAL DX SPEC: NORMAL
PATH REPORT.GROSS SPEC: NORMAL

## 2019-03-15 ENCOUNTER — HOSPITAL ENCOUNTER (EMERGENCY)
Facility: HOSPITAL | Age: 24
Discharge: HOME | End: 2019-03-15
Attending: EMERGENCY MEDICINE | Admitting: EMERGENCY MEDICINE

## 2019-03-15 VITALS
DIASTOLIC BLOOD PRESSURE: 81 MMHG | WEIGHT: 244.2 LBS | BODY MASS INDEX: 40.69 KG/M2 | RESPIRATION RATE: 16 BRPM | SYSTOLIC BLOOD PRESSURE: 134 MMHG | HEART RATE: 94 BPM | HEIGHT: 65 IN | OXYGEN SATURATION: 100 % | TEMPERATURE: 98.4 F

## 2019-03-15 DIAGNOSIS — K21.9 GASTROESOPHAGEAL REFLUX DISEASE, ESOPHAGITIS PRESENCE NOT SPECIFIED: Primary | ICD-10-CM

## 2019-03-15 PROCEDURE — 99282 EMERGENCY DEPT VISIT SF MDM: CPT

## 2019-03-15 RX ORDER — FAMOTIDINE 20 MG/1
20 TABLET, FILM COATED ORAL 2 TIMES DAILY
Qty: 20 TABLET | Refills: 0 | Status: SHIPPED | OUTPATIENT
Start: 2019-03-15 | End: 2021-11-16

## 2019-03-15 NOTE — ED PROVIDER NOTES
"Subjective   This is a 23-year-old female who was here visiting her mother (an ED patient) and so while here anyway she presented for evaluation of intermittent epigastric abdominal discomfort and \"heartburn\" for the last approximately 8 months.  Patient describes that it started towards the end of her pregnancy.  This has been intermittent since.  It is sometimes worse with certain foods.  It is sometimes worse at night.  Sometimes it is present for several days and then will resolve on its own and be gone for several days.  She does not have the pain today.  She has not followed up with a primary care provider for this issue.  She is currently asymptomatic.  No chest pain, heart racing, or dizziness.  No nausea or vomiting.  No stool or urine changes.  No fever or shaking chills.  No ENT complaints.  She has not tried any medications for her heartburn since onset 8 months ago.  Review of systems otherwise negative.  She has no additional complaints.            Review of Systems   All other systems reviewed and are negative.      Past Medical History:   Diagnosis Date   • Amenorrhea    • Benign neoplasm of pituitary gland (CMS/HCC)    • Galactorrhea not associated with childbirth     Galactorrhea due to non-obstetric cause      • Gestational hypertension     IN THIRD TRIMESTER PER HX   • Hyperprolactinemia (CMS/HCC)    • Hyperthyroidism    • Prolactinoma (CMS/HCC)        No Known Allergies    No pertinent family history    Social History     Socioeconomic History   • Marital status: Single     Spouse name: Not on file   • Number of children: Not on file   • Years of education: Not on file   • Highest education level: Not on file   Tobacco Use   • Smoking status: Never Smoker   • Smokeless tobacco: Never Used   Substance and Sexual Activity   • Alcohol use: No   • Drug use: No   • Sexual activity: Defer           Objective   Physical Exam   Constitutional: She appears well-developed and well-nourished.   Eyes: EOM " are normal. Pupils are equal, round, and reactive to light.   Neck: Normal range of motion. Neck supple. No JVD present. No tracheal deviation present.   Cardiovascular: Normal rate, regular rhythm and normal heart sounds.   Pulmonary/Chest: Effort normal and breath sounds normal. No respiratory distress. She has no wheezes. She exhibits no tenderness.   Abdominal: Soft. Bowel sounds are normal. She exhibits no distension. There is no tenderness. There is no guarding.   Musculoskeletal: She exhibits no edema or deformity.   Neurological: She is alert.   Skin: Skin is warm and dry. Capillary refill takes less than 2 seconds.   Psychiatric: She has a normal mood and affect. Her behavior is normal.   Nursing note and vitals reviewed.      Procedures           ED Course      We discussed labs and imaging for evaluation of her symptoms  Patient is declining  She wants no diagnostic testing performed here  Discharge instructions provided  Pepcid prescribed            MDM  Patient with most likely an element of GERD or gastritis  Patient wants no diagnostic testing  She is currently asymptomatic  She is willing to try a prescription for Pepcid  Outpatient follow-up encouraged      Final diagnoses:   Gastroesophageal reflux disease, esophagitis presence not specified            Nolan Becerril,   03/15/19 1943       Nolan Becerril,   03/15/19 1944

## 2019-07-06 ENCOUNTER — HOSPITAL ENCOUNTER (EMERGENCY)
Facility: HOSPITAL | Age: 24
Discharge: HOME | End: 2019-07-06
Admitting: EMERGENCY MEDICINE

## 2019-07-06 VITALS
RESPIRATION RATE: 15 BRPM | HEIGHT: 65 IN | WEIGHT: 245.2 LBS | TEMPERATURE: 98.3 F | SYSTOLIC BLOOD PRESSURE: 125 MMHG | DIASTOLIC BLOOD PRESSURE: 75 MMHG | BODY MASS INDEX: 40.85 KG/M2 | HEART RATE: 89 BPM | OXYGEN SATURATION: 98 %

## 2019-07-06 DIAGNOSIS — M62.830 SPASM OF THORACIC BACK MUSCLE: Primary | ICD-10-CM

## 2019-07-06 PROCEDURE — 99283 EMERGENCY DEPT VISIT LOW MDM: CPT

## 2019-07-06 RX ORDER — LIDOCAINE 50 MG/G
1 PATCH TOPICAL
Status: DISCONTINUED | OUTPATIENT
Start: 2019-07-06 | End: 2019-07-06 | Stop reason: HOSPADM

## 2019-07-06 RX ORDER — CYCLOBENZAPRINE HCL 10 MG
10 TABLET ORAL 3 TIMES DAILY PRN
Qty: 15 TABLET | Refills: 0 | Status: SHIPPED | OUTPATIENT
Start: 2019-07-06 | End: 2019-07-11

## 2019-07-06 RX ORDER — IBUPROFEN 600 MG/1
600 TABLET ORAL 3 TIMES DAILY
Qty: 15 TABLET | Refills: 0 | Status: SHIPPED | OUTPATIENT
Start: 2019-07-06 | End: 2021-07-06 | Stop reason: SDUPTHER

## 2019-07-06 RX ORDER — LIDOCAINE 50 MG/G
1 PATCH TOPICAL EVERY 24 HOURS
Qty: 5 PATCH | Refills: 0 | Status: SHIPPED | OUTPATIENT
Start: 2019-07-06 | End: 2021-11-16

## 2019-07-06 RX ORDER — IBUPROFEN 800 MG/1
800 TABLET ORAL ONCE
Status: COMPLETED | OUTPATIENT
Start: 2019-07-06 | End: 2019-07-06

## 2019-07-06 RX ADMIN — LIDOCAINE 1 PATCH: 50 PATCH TOPICAL at 10:47

## 2019-07-06 RX ADMIN — IBUPROFEN 800 MG: 800 TABLET, FILM COATED ORAL at 10:26

## 2019-07-06 NOTE — DISCHARGE INSTRUCTIONS
Please take the medications exactly as prescribed.  Complete the exercises listed in the attached instructions 2-3 times per day.  Return to the ER if you develop any new, worsening or other concerning symptoms such as as listed below.      Contact a health care provider if:  Your back pain does not improve after 6 weeks of treatment.  Your symptoms get worse.  Get help right away if:  Your back pain is severe.  You are unable to stand or walk.  You develop pain in your legs.  You develop weakness in your buttocks or legs.  You have difficulty controlling when you urinate or when you have a bowel movement.

## 2019-07-06 NOTE — ED PROVIDER NOTES
"Subjective   23-year-old Afro-American female presents to the emergency department with back pain.  Patient reports onset of symptoms 2 days prior to arrival.  She states that she is a  and tried to open a closet door and pulled very hard.  She states that shortly thereafter she had worsening muscular spasms and tenderness.  She states that this is worse with movement and laying on her right side.  She states that the pain has lost her to miss work yesterday.  She has not tried any attempts of treatment prior to arrival.  Rest does improve her symptoms.  She denies chest pain, shortness of breath, fever, cough, chills, abdominal pain, nausea, vomiting, leg swelling.  She states \"I think I pulled a muscle\".  Although the chief complaint does list pain with breathing this is not accurate per the patient.        History provided by:  Patient   used: No        Review of Systems   Constitutional: Negative for chills and fever.   HENT: Negative for congestion and sore throat.    Respiratory: Negative for cough and shortness of breath.    Cardiovascular: Negative for chest pain and palpitations.   Gastrointestinal: Negative for abdominal pain, nausea and vomiting.   Genitourinary: Negative for dysuria and hematuria.   Musculoskeletal: Positive for myalgias. Negative for arthralgias and neck pain.   Skin: Negative for rash and wound.   Neurological: Negative for dizziness, weakness and headaches.   Hematological: Negative for adenopathy. Does not bruise/bleed easily.       Past Medical History:   Diagnosis Date   • Amenorrhea    • Benign neoplasm of pituitary gland (CMS/HCC)    • Galactorrhea not associated with childbirth     Galactorrhea due to non-obstetric cause      • Gestational hypertension     IN THIRD TRIMESTER PER HX   • Hyperprolactinemia (CMS/HCC)    • Hyperthyroidism    • Prolactinoma (CMS/HCC)        No Known Allergies    No past surgical history on file.    No family history on " file.    Social History     Socioeconomic History   • Marital status: Single     Spouse name: Not on file   • Number of children: Not on file   • Years of education: Not on file   • Highest education level: Not on file   Tobacco Use   • Smoking status: Never Smoker   • Smokeless tobacco: Never Used   Substance and Sexual Activity   • Alcohol use: No   • Drug use: No   • Sexual activity: Defer       Lab Results (last 24 hours)     ** No results found for the last 24 hours. **          Objective   Physical Exam   Constitutional: She is oriented to person, place, and time. She appears well-developed and well-nourished. No distress.   HENT:   Head: Normocephalic and atraumatic.   Right Ear: External ear normal.   Left Ear: External ear normal.   Mouth/Throat: Oropharynx is clear and moist.   Eyes: Conjunctivae and EOM are normal. Pupils are equal, round, and reactive to light.   Neck: Normal range of motion.   Cardiovascular: Normal rate, regular rhythm, normal heart sounds and intact distal pulses. Exam reveals no friction rub.   No murmur heard.  Pulmonary/Chest: Effort normal and breath sounds normal. No respiratory distress. She has no wheezes. She has no rales. She exhibits no tenderness.   Abdominal: Soft. Bowel sounds are normal. She exhibits no distension and no mass. There is no tenderness. There is no rebound and no guarding.   Musculoskeletal: Normal range of motion.        Right shoulder: She exhibits tenderness, pain and spasm. She exhibits normal range of motion, no bony tenderness, no swelling, no effusion and no deformity.        Arms:  Reproducible muscular tenderness of the right paraspinal thoracic area.  No signs of infection including erythema, warmth.  There are no obvious step-offs.  There is no midline tenderness.   Neurological: She is alert and oriented to person, place, and time.   Skin: Skin is warm and dry. Capillary refill takes less than 2 seconds. She is not diaphoretic.   Psychiatric:  "She has a normal mood and affect. Her behavior is normal.   Nursing note and vitals reviewed.      Procedures         No orders to display       /75   Pulse 89   Temp 98.3 °F (36.8 °C)   Resp 15   Ht 165.1 cm (65\")   Wt 111 kg (245 lb 3.2 oz)   SpO2 98%   BMI 40.80 kg/m²     ED Course         Medications   lidocaine (LIDODERM) 5 % 1 patch (not administered)   ibuprofen (ADVIL,MOTRIN) tablet 800 mg (800 mg Oral Given 7/6/19 1026)            MDM  Number of Diagnoses or Management Options  Diagnosis management comments: This is a 23-year-old with muscle spasm of the right thoracic paraspinal muscular area.  This occurred after pulling on a door to open it when she was working as a .  No red flag symptoms.  Will discharge with muscle relaxers and anti-inflammatories.  Strict return precautions given strongly encouraged follow-up.  Patient verbal agreement to plan at this time.    Risk of Complications, Morbidity, and/or Mortality  Presenting problems: low  Diagnostic procedures: low  Management options: low    Patient Progress  Patient progress: stable      Final diagnoses:   Spasm of thoracic back muscle          Antonino Wolfe PA-C  07/06/19 1030    "

## 2019-08-04 ENCOUNTER — HOSPITAL ENCOUNTER (EMERGENCY)
Facility: HOSPITAL | Age: 24
Discharge: HOME | End: 2019-08-04
Admitting: EMERGENCY MEDICINE

## 2019-08-04 VITALS
HEIGHT: 64 IN | OXYGEN SATURATION: 100 % | HEART RATE: 69 BPM | SYSTOLIC BLOOD PRESSURE: 129 MMHG | DIASTOLIC BLOOD PRESSURE: 81 MMHG | RESPIRATION RATE: 16 BRPM | WEIGHT: 244 LBS | TEMPERATURE: 97 F | BODY MASS INDEX: 41.66 KG/M2

## 2019-08-04 DIAGNOSIS — R10.13 EPIGASTRIC PAIN: Primary | ICD-10-CM

## 2019-08-04 DIAGNOSIS — K21.9 GASTROESOPHAGEAL REFLUX DISEASE, ESOPHAGITIS PRESENCE NOT SPECIFIED: ICD-10-CM

## 2019-08-04 LAB
ALBUMIN SERPL-MCNC: 4.4 G/DL (ref 3.5–5)
ALBUMIN/GLOB SERPL: 1.3 G/DL (ref 1.1–2.5)
ALP SERPL-CCNC: 56 U/L (ref 24–120)
ALT SERPL W P-5'-P-CCNC: 36 U/L (ref 0–54)
ANION GAP SERPL CALCULATED.3IONS-SCNC: 6 MMOL/L (ref 4–13)
AST SERPL-CCNC: 31 U/L (ref 7–45)
B-HCG UR QL: NEGATIVE
BASOPHILS # BLD AUTO: 0.04 10*3/MM3 (ref 0–0.2)
BASOPHILS NFR BLD AUTO: 0.6 % (ref 0–2)
BILIRUB SERPL-MCNC: 1.1 MG/DL (ref 0.1–1)
BILIRUB UR QL STRIP: NEGATIVE
BUN BLD-MCNC: 9 MG/DL (ref 5–21)
BUN/CREAT SERPL: 11.3 (ref 7–25)
CALCIUM SPEC-SCNC: 9.5 MG/DL (ref 8.4–10.4)
CHLORIDE SERPL-SCNC: 107 MMOL/L (ref 98–110)
CLARITY UR: CLEAR
CO2 SERPL-SCNC: 28 MMOL/L (ref 24–31)
COLOR UR: YELLOW
CREAT BLD-MCNC: 0.8 MG/DL (ref 0.5–1.4)
DEPRECATED RDW RBC AUTO: 39.7 FL (ref 40–54)
EOSINOPHIL # BLD AUTO: 0.03 10*3/MM3 (ref 0–0.7)
EOSINOPHIL NFR BLD AUTO: 0.4 % (ref 0–4)
ERYTHROCYTE [DISTWIDTH] IN BLOOD BY AUTOMATED COUNT: 12 % (ref 12–15)
GFR SERPL CREATININE-BSD FRML MDRD: 106 ML/MIN/1.73
GLOBULIN UR ELPH-MCNC: 3.3 GM/DL
GLUCOSE BLD-MCNC: 85 MG/DL (ref 70–100)
GLUCOSE UR STRIP-MCNC: NEGATIVE MG/DL
HCT VFR BLD AUTO: 41 % (ref 37–47)
HGB BLD-MCNC: 13.7 G/DL (ref 12–16)
HGB UR QL STRIP.AUTO: NEGATIVE
IMM GRANULOCYTES # BLD AUTO: 0.02 10*3/MM3 (ref 0–0.05)
IMM GRANULOCYTES NFR BLD AUTO: 0.3 % (ref 0–5)
INTERNAL NEGATIVE CONTROL: NEGATIVE
INTERNAL POSITIVE CONTROL: POSITIVE
KETONES UR QL STRIP: NEGATIVE
LEUKOCYTE ESTERASE UR QL STRIP.AUTO: NEGATIVE
LIPASE SERPL-CCNC: 35 U/L (ref 23–203)
LYMPHOCYTES # BLD AUTO: 3.71 10*3/MM3 (ref 0.72–4.86)
LYMPHOCYTES NFR BLD AUTO: 52 % (ref 15–45)
Lab: NORMAL
MCH RBC QN AUTO: 30.2 PG (ref 28–32)
MCHC RBC AUTO-ENTMCNC: 33.4 G/DL (ref 33–36)
MCV RBC AUTO: 90.3 FL (ref 82–98)
MONOCYTES # BLD AUTO: 0.43 10*3/MM3 (ref 0.19–1.3)
MONOCYTES NFR BLD AUTO: 6 % (ref 4–12)
NEUTROPHILS # BLD AUTO: 2.9 10*3/MM3 (ref 1.87–8.4)
NEUTROPHILS NFR BLD AUTO: 40.7 % (ref 39–78)
NITRITE UR QL STRIP: NEGATIVE
NRBC BLD AUTO-RTO: 0 /100 WBC (ref 0–0.2)
PH UR STRIP.AUTO: <=5 [PH] (ref 5–8)
PLATELET # BLD AUTO: 249 10*3/MM3 (ref 130–400)
PMV BLD AUTO: 11.1 FL (ref 6–12)
POTASSIUM BLD-SCNC: 3.8 MMOL/L (ref 3.5–5.3)
PROT SERPL-MCNC: 7.7 G/DL (ref 6.3–8.7)
PROT UR QL STRIP: NEGATIVE
RBC # BLD AUTO: 4.54 10*6/MM3 (ref 4.2–5.4)
SODIUM BLD-SCNC: 141 MMOL/L (ref 135–145)
SP GR UR STRIP: >=1.03 (ref 1–1.03)
UROBILINOGEN UR QL STRIP: NORMAL
WBC NRBC COR # BLD: 7.13 10*3/MM3 (ref 4.8–10.8)

## 2019-08-04 PROCEDURE — 83690 ASSAY OF LIPASE: CPT | Performed by: NURSE PRACTITIONER

## 2019-08-04 PROCEDURE — 96374 THER/PROPH/DIAG INJ IV PUSH: CPT

## 2019-08-04 PROCEDURE — 80053 COMPREHEN METABOLIC PANEL: CPT | Performed by: NURSE PRACTITIONER

## 2019-08-04 PROCEDURE — 99283 EMERGENCY DEPT VISIT LOW MDM: CPT

## 2019-08-04 PROCEDURE — 85025 COMPLETE CBC W/AUTO DIFF WBC: CPT | Performed by: NURSE PRACTITIONER

## 2019-08-04 PROCEDURE — 81003 URINALYSIS AUTO W/O SCOPE: CPT | Performed by: NURSE PRACTITIONER

## 2019-08-04 PROCEDURE — 81025 URINE PREGNANCY TEST: CPT | Performed by: NURSE PRACTITIONER

## 2019-08-04 RX ORDER — SODIUM CHLORIDE 0.9 % (FLUSH) 0.9 %
10 SYRINGE (ML) INJECTION AS NEEDED
Status: DISCONTINUED | OUTPATIENT
Start: 2019-08-04 | End: 2019-08-04 | Stop reason: HOSPADM

## 2019-08-04 RX ORDER — FAMOTIDINE 10 MG/ML
20 INJECTION, SOLUTION INTRAVENOUS ONCE
Status: COMPLETED | OUTPATIENT
Start: 2019-08-04 | End: 2019-08-04

## 2019-08-04 RX ORDER — OMEPRAZOLE 20 MG/1
20 CAPSULE, DELAYED RELEASE ORAL DAILY
Qty: 7 CAPSULE | Refills: 0 | Status: SHIPPED | OUTPATIENT
Start: 2019-08-04 | End: 2019-08-11

## 2019-08-04 RX ADMIN — FAMOTIDINE 20 MG: 10 INJECTION INTRAVENOUS at 16:51

## 2019-08-04 RX ADMIN — SODIUM CHLORIDE 1000 ML: 9 INJECTION, SOLUTION INTRAVENOUS at 16:53

## 2019-08-05 NOTE — DISCHARGE INSTRUCTIONS
Follow up with one of the [facility] physician groups below to setup primary care. If you have trouble making an appointment, please call the [facility] Nurse Line at [phone]    Dr. Rowdy Padron DO, Dr. Ayo Wilson DO,  RYAN Artis, and RYAN Padgett  [facility] - [address], [address], [place], [place] [address] [phone]     Dr. Mikey Gonzalez MD  [facility] - [address], [address], [place], [place] [address] [phone]    Dr. Celeste Villatoro MD, and RYAN Cervantes  [facility] [address], [place], [place] [address] [phone]    Dr. Mauri Gan MD and Dr. Niels Santana MD  [facility] [address], [address]  [phone]    Dr. Raffy Crockett MD  [facility]  [address] [facility] [facility] [address], [place], [place], [address] [phone]    Dr. Devante Bailey MD  [facility] - [place]  [address], [place], [address] [phone]

## 2019-08-09 NOTE — ED PROVIDER NOTES
Subjective   Patient is a 23-year-old female presents ER with complaint of epigastric pain and acid reflux.  Patient reports she has had since the birth of her daughter several months ago.  She denies any nausea or vomiting.  She denies any diarrhea constipation or fever.  Patient has tried over-the-counter medication without relief.  She is here today and states that she would like a prescription for Prilosec as a friend told her this might help her.  She presents here today for further evaluation.        History provided by:  Patient   used: No    Abdominal Pain   Pain location:  Epigastric  Pain quality: burning    Pain radiates to:  Does not radiate  Pain severity:  Mild  Onset quality:  Sudden  Duration: 10 months.  Progression:  Unchanged  Chronicity:  Chronic  Context: not alcohol use, not awakening from sleep, not diet changes, not eating, not laxative use, not medication withdrawal, not previous surgeries, not recent illness, not recent sexual activity, not recent travel, not retching, not sick contacts, not suspicious food intake and not trauma    Relieved by:  Nothing  Worsened by:  Nothing  Ineffective treatments:  None tried  Associated symptoms: no anorexia, no belching, no chest pain, no chills, no constipation, no cough, no diarrhea, no dysuria, no fatigue, no fever, no flatus, no hematemesis, no hematochezia, no hematuria, no melena, no nausea, no shortness of breath, no sore throat, no vaginal bleeding, no vaginal discharge and no vomiting    Risk factors: no alcohol abuse, no aspirin use, not elderly, has not had multiple surgeries, no NSAID use, not obese, not pregnant and no recent hospitalization        Review of Systems   Constitutional: Negative for chills, fatigue and fever.   HENT: Negative for sore throat.    Respiratory: Negative for cough and shortness of breath.    Cardiovascular: Negative for chest pain.   Gastrointestinal: Positive for abdominal pain. Negative for  anorexia, constipation, diarrhea, flatus, hematemesis, hematochezia, melena, nausea and vomiting.   Genitourinary: Negative for dysuria, hematuria, vaginal bleeding and vaginal discharge.   All other systems reviewed and are negative.      Past Medical History:   Diagnosis Date   • Amenorrhea    • Benign neoplasm of pituitary gland (CMS/HCC)    • Galactorrhea not associated with childbirth     Galactorrhea due to non-obstetric cause      • Gestational hypertension     IN THIRD TRIMESTER PER HX   • Hyperprolactinemia (CMS/HCC)    • Hyperthyroidism    • Prolactinoma (CMS/HCC)        No Known Allergies    History reviewed. No pertinent surgical history.    History reviewed. No pertinent family history.    Social History     Socioeconomic History   • Marital status: Single     Spouse name: Not on file   • Number of children: Not on file   • Years of education: Not on file   • Highest education level: Not on file   Tobacco Use   • Smoking status: Never Smoker   • Smokeless tobacco: Never Used   Substance and Sexual Activity   • Alcohol use: No   • Drug use: No   • Sexual activity: Defer           Objective   Physical Exam   Constitutional: She is oriented to person, place, and time. She appears well-developed and well-nourished.   HENT:   Head: Normocephalic and atraumatic.   Eyes: Conjunctivae are normal. Pupils are equal, round, and reactive to light.   Cardiovascular: Normal rate, regular rhythm and normal heart sounds.   Pulmonary/Chest: Effort normal and breath sounds normal.   Abdominal: Soft. Bowel sounds are normal. There is tenderness in the epigastric area.   Neurological: She is alert and oriented to person, place, and time.   Skin: Skin is warm and dry. Capillary refill takes less than 2 seconds.   Psychiatric: She has a normal mood and affect.   Nursing note and vitals reviewed.      Procedures           ED Course  ED Course as of Aug 09 0709   Fri Aug 09, 2019   0709 Patient lab work is unremarkable.  At  this time she reports that she feels better after the Pepcid.  She will be discharged home in stable condition.  I will give her prescription for the Prilosec.  She is advised to follow-up with primary care provider in 1 to 2 days for recheck.  She does return to ER if any new or worsening symptoms.  She is advised to avoid fatty or spicy foods that may aggravate her reflux.   [LF]      ED Course User Index  [LF] Kasandra Lynn, APRN      No orders to display     Labs Reviewed   COMPREHENSIVE METABOLIC PANEL - Abnormal; Notable for the following components:       Result Value    Total Bilirubin 1.1 (*)     All other components within normal limits    Narrative:     GFR Normal >60  Chronic Kidney Disease <60  Kidney Failure <15   CBC WITH AUTO DIFFERENTIAL - Abnormal; Notable for the following components:    RDW-SD 39.7 (*)     Lymphocyte % 52.0 (*)     All other components within normal limits   LIPASE - Normal   URINALYSIS W/ MICROSCOPIC IF INDICATED (NO CULTURE) - Normal    Narrative:     Urine microscopic not indicated.   POCT PEFORM URINE PREGNANCY - Normal   CBC AND DIFFERENTIAL    Narrative:     The following orders were created for panel order CBC & Differential.  Procedure                               Abnormality         Status                     ---------                               -----------         ------                     CBC Auto Differential[[id number]]        Abnormal            Final result                 Please view results for these tests on the individual orders.                 MDM  Number of Diagnoses or Management Options  Epigastric pain: new and requires workup  Gastroesophageal reflux disease, esophagitis presence not specified: new and requires workup     Amount and/or Complexity of Data Reviewed  Clinical lab tests: ordered and reviewed    Patient Progress  Patient progress: stable        Final diagnoses:   Epigastric pain   Gastroesophageal reflux disease, esophagitis presence  not specified            Kasandra Lynn, APRN  08/09/19 0709

## 2021-05-24 NOTE — NURSING NOTE
----- Message from Topher Chapman [facility] sent at 5/24/2021 11:19 AM CDT -----  We may have talked about Carol before.  She has not filled myfortic with us in over a year.  The rx in epic is over 1 year old.  She filled 1 month of neoral in December, feb, and April.  Today she only ordered her carvedilol.    Topher Chapman [facility]  Specialty Pharmacist [phone]     pt states she started cramping on Tuesday, states pain started getting worse this morning around 0730, denies vaginal bleeding, leaking of fluid, abdomen soft non-tender

## 2021-07-06 ENCOUNTER — HOSPITAL ENCOUNTER (EMERGENCY)
Facility: HOSPITAL | Age: 26
Discharge: HOME | End: 2021-07-07
Attending: EMERGENCY MEDICINE | Admitting: EMERGENCY MEDICINE

## 2021-07-06 DIAGNOSIS — K08.89 PAIN, DENTAL: Primary | ICD-10-CM

## 2021-07-06 PROCEDURE — 99283 EMERGENCY DEPT VISIT LOW MDM: CPT

## 2021-07-06 RX ORDER — KETOROLAC TROMETHAMINE 10 MG/1
10 TABLET, FILM COATED ORAL EVERY 6 HOURS PRN
Qty: 15 TABLET | Refills: 0 | Status: SHIPPED | OUTPATIENT
Start: 2021-07-06 | End: 2021-11-10

## 2021-07-06 RX ORDER — KETOROLAC TROMETHAMINE 30 MG/ML
30 INJECTION, SOLUTION INTRAMUSCULAR; INTRAVENOUS ONCE
Status: COMPLETED | OUTPATIENT
Start: 2021-07-06 | End: 2021-07-07

## 2021-07-06 RX ORDER — PENICILLIN V POTASSIUM 500 MG/1
500 TABLET ORAL 4 TIMES DAILY
Qty: 28 TABLET | Refills: 0 | Status: SHIPPED | OUTPATIENT
Start: 2021-07-06 | End: 2021-11-10

## 2021-07-07 VITALS
RESPIRATION RATE: 18 BRPM | BODY MASS INDEX: 41.14 KG/M2 | WEIGHT: 256 LBS | DIASTOLIC BLOOD PRESSURE: 96 MMHG | OXYGEN SATURATION: 99 % | TEMPERATURE: 97.6 F | HEIGHT: 66 IN | HEART RATE: 83 BPM | SYSTOLIC BLOOD PRESSURE: 134 MMHG

## 2021-07-07 PROCEDURE — 96372 THER/PROPH/DIAG INJ SC/IM: CPT

## 2021-07-07 PROCEDURE — 25010000002 KETOROLAC TROMETHAMINE PER 15 MG: Performed by: EMERGENCY MEDICINE

## 2021-07-07 RX ADMIN — KETOROLAC TROMETHAMINE 30 MG: 30 INJECTION, SOLUTION INTRAMUSCULAR; INTRAVENOUS at 00:03

## 2021-07-07 NOTE — ED NOTES
Patient presents to the ED with the CC of right lower dental pain. She states she was seen at [facility] and was told she would have to have her tooth pulled which they could not do. She states the pain has been ongoing for 2 months. C/o of pain to cold and chewing. Denies facial swelling, difficulty breathing, or swallowing.      Ester Garcia RN  07/06/21 3225

## 2021-07-07 NOTE — ED PROVIDER NOTES
Subjective   24 y/o female arrives for evaluation of bilateral lower tooth pain for several months. She has already seen a dentist and was told she needed an oral surgeon to remove her wisdom teeth. She noted worsening pain today thus arrives. She did not take anything at home for this. The pain is confined to her bottom teeth worse with chewing. She denies fevers or chills, cp or sob. She is actively eating peanuts when I come into the room.           Review of Systems   All other systems reviewed and are negative.      Past Medical History:   Diagnosis Date   • Amenorrhea    • Benign neoplasm of pituitary gland (CMS/HCC)    • Galactorrhea not associated with childbirth     Galactorrhea due to non-obstetric cause      • Gestational hypertension     IN THIRD TRIMESTER PER HX   • Hyperprolactinemia (CMS/HCC)    • Hyperthyroidism    • Prolactinoma (CMS/HCC)        No Known Allergies    No past surgical history on file.    No family history on file.    Social History     Socioeconomic History   • Marital status: Single     Spouse name: Not on file   • Number of children: Not on file   • Years of education: Not on file   • Highest education level: Not on file   Tobacco Use   • Smoking status: Never Smoker   • Smokeless tobacco: Never Used   Substance and Sexual Activity   • Alcohol use: No   • Drug use: No   • Sexual activity: Defer           Objective   Physical Exam  Vitals and nursing note reviewed.   Constitutional:       Appearance: Normal appearance. She is normal weight.   HENT:      Head: Normocephalic.      Right Ear: External ear normal.      Left Ear: External ear normal.      Nose: Nose normal.      Mouth/Throat:      Mouth: Mucous membranes are moist.      Pharynx: Oropharynx is clear.        Comments: She has cracks in the crowns of the teeth marked, no signs of abscess nor infection. She has no signs of ludwigs.   Eyes:      Conjunctiva/sclera: Conjunctivae normal.      Pupils: Pupils are equal, round,  and reactive to light.   Musculoskeletal:         General: Normal range of motion.   Skin:     General: Skin is warm.      Capillary Refill: Capillary refill takes less than 2 seconds.   Neurological:      General: No focal deficit present.      Mental Status: She is alert and oriented to person, place, and time. Mental status is at baseline.   Psychiatric:         Mood and Affect: Mood normal.         Behavior: Behavior normal.         Thought Content: Thought content normal.         Procedures           ED Course  ED Course as of Jul 06 2350   Tue Jul 06, 2021 2346 No signs of infection at this time, no signs of ludwigs. We will do nsaids and abx. She is aware of need ot see oral surgeon     []      ED Course User Index  [] Ramses Haile MD                                           [facility]    Final diagnoses:   Pain, dental       ED Disposition  ED Disposition     ED Disposition Condition Comment    Discharge Stable           Provider, No Known  [facility] [id number] [phone]               Medication List      New Prescriptions    ketorolac 10 MG tablet  Commonly known as: TORADOL  Take 1 tablet by mouth Every 6 (Six) Hours As Needed for Moderate Pain .     penicillin v potassium 500 MG tablet  Commonly known as: VEETID  Take 1 tablet by mouth 4 (Four) Times a Day.        Stop    ibuprofen 600 MG tablet  Commonly known as: ADVIL,MOTRIN           Where to Get Your Medications      These medications were sent to [facility]/pharmacy #[id number] - [place], [place] - [address] AT ACROSS FROM [address] - [phone]  - [phone] FX  [address] [place] [address]    Hours: 24-hours Phone: [phone]   · ketorolac 10 MG tablet  · penicillin v potassium 500 MG tablet          Ramses Haile MD  07/06/21 0432

## 2021-11-10 ENCOUNTER — HOSPITAL ENCOUNTER (EMERGENCY)
Facility: HOSPITAL | Age: 26
Discharge: HOME | End: 2021-11-10
Attending: INTERNAL MEDICINE | Admitting: INTERNAL MEDICINE

## 2021-11-10 VITALS
HEART RATE: 89 BPM | HEIGHT: 65 IN | WEIGHT: 262 LBS | SYSTOLIC BLOOD PRESSURE: 154 MMHG | DIASTOLIC BLOOD PRESSURE: 83 MMHG | BODY MASS INDEX: 43.65 KG/M2 | RESPIRATION RATE: 17 BRPM | TEMPERATURE: 98.3 F | OXYGEN SATURATION: 99 %

## 2021-11-10 DIAGNOSIS — K02.9 DENTAL CARIES: Primary | ICD-10-CM

## 2021-11-10 PROCEDURE — 99283 EMERGENCY DEPT VISIT LOW MDM: CPT

## 2021-11-10 RX ORDER — KETOROLAC TROMETHAMINE 10 MG/1
10 TABLET, FILM COATED ORAL ONCE
Status: COMPLETED | OUTPATIENT
Start: 2021-11-10 | End: 2021-11-10

## 2021-11-10 RX ORDER — AMOXICILLIN 500 MG/1
1000 CAPSULE ORAL 3 TIMES DAILY
Qty: 21 CAPSULE | Refills: 0 | Status: SHIPPED | OUTPATIENT
Start: 2021-11-10 | End: 2021-11-16

## 2021-11-10 RX ORDER — KETOROLAC TROMETHAMINE 10 MG/1
10 TABLET, FILM COATED ORAL EVERY 6 HOURS PRN
Qty: 15 TABLET | Refills: 0 | Status: SHIPPED | OUTPATIENT
Start: 2021-11-10 | End: 2021-11-16

## 2021-11-10 RX ADMIN — KETOROLAC TROMETHAMINE 10 MG: 10 TABLET, FILM COATED ORAL at 01:19

## 2021-11-10 NOTE — ED PROVIDER NOTES
Subjective   Patient is a 26-year-old female who presents with right lower jaw pain which she states is related to irritation in her right lower dental line.  She states that she knows that she needs to see a dentist and she is called around and does not have a dental appointment until February.  She denies any fevers or chills.  She denies any facial swelling.          Review of Systems   Constitutional: Negative for chills, fatigue and fever.   HENT: Positive for dental problem. Negative for congestion and facial swelling.    Eyes: Negative for photophobia, discharge and visual disturbance.   Respiratory: Negative for cough, shortness of breath and wheezing.    Cardiovascular: Negative for chest pain, palpitations and leg swelling.   Gastrointestinal: Negative for abdominal pain, diarrhea, nausea and vomiting.   Endocrine: Negative for cold intolerance and heat intolerance.   Genitourinary: Negative for difficulty urinating and urgency.   Musculoskeletal: Negative for arthralgias, joint swelling and myalgias.   Skin: Negative for color change and pallor.   Neurological: Negative for dizziness and light-headedness.   Hematological: Negative for adenopathy. Does not bruise/bleed easily.   Psychiatric/Behavioral: Negative for agitation, behavioral problems and confusion.       Past Medical History:   Diagnosis Date   • Amenorrhea    • Benign neoplasm of pituitary gland (HCC)    • Galactorrhea not associated with childbirth     Galactorrhea due to non-obstetric cause      • Gestational hypertension     IN THIRD TRIMESTER PER HX   • Hyperprolactinemia (HCC)    • Hyperthyroidism    • Prolactinoma (HCC)        No Known Allergies    History reviewed. No pertinent surgical history.    History reviewed. No pertinent family history.    Social History     Socioeconomic History   • Marital status: Single   Tobacco Use   • Smoking status: Never Smoker   • Smokeless tobacco: Never Used   Substance and Sexual Activity   • Alcohol  use: No   • Drug use: No   • Sexual activity: Defer           Objective   Physical Exam  Vitals and nursing note reviewed.   Constitutional:       Appearance: Normal appearance. She is well-developed.   HENT:      Head: Normocephalic and atraumatic.      Mouth/Throat:      Comments: Dental caries  Eyes:      Extraocular Movements: Extraocular movements intact.      Conjunctiva/sclera: Conjunctivae normal.      Pupils: Pupils are equal, round, and reactive to light.   Cardiovascular:      Rate and Rhythm: Normal rate and regular rhythm.      Heart sounds: Normal heart sounds.   Pulmonary:      Effort: Pulmonary effort is normal.      Breath sounds: Normal breath sounds.   Abdominal:      General: Bowel sounds are normal. There is no distension.      Palpations: Abdomen is soft.   Musculoskeletal:         General: Normal range of motion.      Cervical back: Normal range of motion and neck supple.   Skin:     General: Skin is warm and dry.   Neurological:      General: No focal deficit present.      Mental Status: She is alert and oriented to person, place, and time.      Cranial Nerves: No cranial nerve deficit.   Psychiatric:         Behavior: Behavior normal.         Thought Content: Thought content normal.         Procedures           ED Course  ED Course as of 11/10/21 0410   Wed Nov 10, 2021   0408 Counseled on the need to keep calling dentist and try to get an earlier appointment. [RW]      ED Course User Index  [RW] Chaz Mckeon MD                                           [facility]    Final diagnoses:   Dental caries       ED Disposition  ED Disposition     ED Disposition Condition Comment    Discharge Stable           Follow-up with dentist this week               Medication List      New Prescriptions    amoxicillin 500 MG capsule  Commonly known as: AMOXIL  Take 2 capsules by mouth 3 (Three) Times a Day.     ketorolac 10 MG tablet  Commonly known as: TORADOL  Take 1 tablet by mouth Every 6 (Six) Hours  As Needed for Moderate Pain .           Where to Get Your Medications      These medications were sent to [facility] - [place] - [address] - [phone]  - [phone] [address] [address]    Phone: [phone]   · amoxicillin 500 MG capsule  · ketorolac 10 MG tablet          Chaz Mckeon MD  11/10/21 2230

## 2021-11-11 ENCOUNTER — PATIENT OUTREACH (OUTPATIENT)
Dept: CASE MANAGEMENT | Facility: OTHER | Age: 26
End: 2021-11-11

## 2021-11-11 NOTE — OUTREACH NOTE
Ambulatory Case Management Note    Patient Outreach    Ginette CHAVIS patient seen at [facility] ED 11.10.21 for dental caries. Spoke with patient and she filled the Amoxicillin and Toradol. She has a scheduled oral surgeon appointment in February at the  Dentistry program in [place]. She stated this was the only provider that would accept her insurance. [facility] provided her with the [facility] dental program number. She currently uses [employer] for local transportation and asked about it transporting to dental appointment in February. ACM suggested she contact her insurance or [employer] to see if long distance transportation is available. She is consuming fluids. She ate cereal this morning and required pain medication following the meal. ACM suggested soft diet and to use warm salt water rinses after eating. Discussed importance of establishing PCP follow up and she was agreeable for ACM to assist with the process. She reports have a [employer] account but shows pending at this time. Reviewed insurance offerings and suggested she contact carrier to review eligibility of services.     Care Coordination    Contacted the [facility] to schedule a new patient follow up for 11.16.21 @10:00 AM with JONI Alvarez.    Patient Outreach    Spoke with patient to provide PCP follow up information.    General & Health Literacy Assessment    Questions/Answers      Most Recent Value   Assessment Completed With Patient   Living Arrangement Children   Type of Residence Apartment   Home Care Services No   Communication Device Yes   Bed or Wheelchair Confined No   Difficulty Keeping Appointments No   How often do you have someone help you read hospital materials? Never   How often do you have problems learning about your medical condition because of difficulty understanding written information? Never   How often do you have a problem understanding what is told to you about your medical condition? Never   How confident are you filling out medical forms by  yourself? Extremely   Health Literacy Excellent        SDOH updated and reviewed with the patient during this program:     Financial Resource Strain: Low Risk    • Difficulty of Paying Living Expenses: Not hard at all       Food Insecurity: No Food Insecurity   • Worried About Running Out of Food in the Last Year: Never true   • Ran Out of Food in the Last Year: Never true       Transportation Needs: No Transportation Needs   • Lack of Transportation (Medical): No   • Lack of Transportation (Non-Medical): No       Housing Stability: Unknown   • Unable to Pay for Housing in the Last Year: No   • Number of Places Lived in the Last Year: Not on file   • Unstable Housing in the Last Year: No           Fabiana Wolfe RN  Ambulatory Case Management    11/11/2021, 11:10 CST

## 2021-11-16 ENCOUNTER — PATIENT ROUNDING (BHMG ONLY) (OUTPATIENT)
Dept: INTERNAL MEDICINE | Facility: CLINIC | Age: 26
End: 2021-11-16

## 2021-11-16 ENCOUNTER — OFFICE VISIT (OUTPATIENT)
Dept: INTERNAL MEDICINE | Facility: CLINIC | Age: 26
End: 2021-11-16

## 2021-11-16 VITALS
HEART RATE: 98 BPM | WEIGHT: 263 LBS | HEIGHT: 65 IN | BODY MASS INDEX: 43.82 KG/M2 | SYSTOLIC BLOOD PRESSURE: 128 MMHG | DIASTOLIC BLOOD PRESSURE: 88 MMHG | OXYGEN SATURATION: 98 %

## 2021-11-16 DIAGNOSIS — E05.90 HYPERTHYROIDISM: ICD-10-CM

## 2021-11-16 DIAGNOSIS — K21.9 GASTROESOPHAGEAL REFLUX DISEASE, UNSPECIFIED WHETHER ESOPHAGITIS PRESENT: Primary | ICD-10-CM

## 2021-11-16 DIAGNOSIS — E22.1 HYPERPROLACTINEMIA (HCC): ICD-10-CM

## 2021-11-16 DIAGNOSIS — E66.01 MORBID (SEVERE) OBESITY DUE TO EXCESS CALORIES (HCC): ICD-10-CM

## 2021-11-16 DIAGNOSIS — K08.89 PAIN, DENTAL: ICD-10-CM

## 2021-11-16 PROCEDURE — 99204 OFFICE O/P NEW MOD 45 MIN: CPT | Performed by: NURSE PRACTITIONER

## 2021-11-16 RX ORDER — PANTOPRAZOLE SODIUM 40 MG/1
40 TABLET, DELAYED RELEASE ORAL DAILY
Qty: 30 TABLET | Refills: 5 | Status: SHIPPED | OUTPATIENT
Start: 2021-11-16

## 2021-11-16 NOTE — PROGRESS NOTES
Chief Complaint   Patient presents with   • Establish Care         History:  Dada Garcia is a 26 y.o. female who presents today for evaluation of the above problems.          1) Here to establish care.  Sees Dr. Rees and Dr. Michele Tidwell for hyperprolactinemia and hyperthyroidism.  States she just had a whole panel of labs done by Dr. Rees.  Will get copies of those to us.  Not currently being treated with medications.    2)GERD symptoms since being pregnant with her daughter, who is now 3 years old.  Has not been treated for this.  Feels burning up into chest and throat.  No abdominal pain, no nausea/vomiting.    3) Has been up and down with her weight.  Exercises regularly.  Tries to watch sugar and carbs and portions.  Feels when she gets hormones regulated she will be able to manage weight better. Did [facility] program before.  Would consider bariatric or Dr. Gandhi referral, but not at this time.    4)Has an impacted right lower wisdom tooth.  Got antibiotics from ER last week, and pain is significantly improved.  Is on schedule to have wisdom teeth removed in February.      ROS:  Review of Systems  As above    No Known Allergies  Past Medical History:   Diagnosis Date   • Amenorrhea    • Benign neoplasm of pituitary gland (HCC)    • Galactorrhea not associated with childbirth     Galactorrhea due to non-obstetric cause      • GERD (gastroesophageal reflux disease)    • Gestational hypertension     IN THIRD TRIMESTER PER HX   • Hyperprolactinemia (HCC)    • Hyperthyroidism    • Prolactinoma (HCC)      History reviewed. No pertinent surgical history.  Family History   Problem Relation Age of Onset   • Heart disease Maternal Grandmother    • Cancer Paternal Grandmother    • Diabetes Paternal Grandfather      Dada  reports that she has never smoked. She has never used smokeless tobacco. She reports that she does not drink alcohol and does not use drugs.    I have reviewed and updated  "the above documentation (if necessary) including but not limited to chief complaint, ROS, PFSH, allergies and medications        Current Outpatient Medications:   •  pantoprazole (Protonix) 40 MG EC tablet, Take 1 tablet by mouth Daily., Disp: 30 tablet, Rfl: 5      PHQ-9 Total Score:      OBJECTIVE:  Visit Vitals  /88 (BP Location: Right arm, Patient Position: Sitting, Cuff Size: Adult)   Pulse 98   Ht 165.1 cm (65\")   Wt 119 kg (263 lb)   SpO2 98%   BMI 43.77 kg/m²      Physical Exam  Vitals and nursing note reviewed.   Constitutional:       General: She is not in acute distress.     Appearance: Normal appearance. She is not ill-appearing, toxic-appearing or diaphoretic.   HENT:      Head: Normocephalic and atraumatic.      Right Ear: Tympanic membrane, ear canal and external ear normal. There is no impacted cerumen.      Left Ear: Tympanic membrane, ear canal and external ear normal. There is no impacted cerumen.      Nose: Nose normal. No congestion or rhinorrhea.      Mouth/Throat:      Mouth: Mucous membranes are moist.      Pharynx: Oropharynx is clear. No oropharyngeal exudate or posterior oropharyngeal erythema.   Eyes:      General: No scleral icterus.        Right eye: No discharge.         Left eye: No discharge.      Extraocular Movements: Extraocular movements intact.      Conjunctiva/sclera: Conjunctivae normal.      Pupils: Pupils are equal, round, and reactive to light.   Neck:      Comments: No thyromegaly appreciated  Cardiovascular:      Rate and Rhythm: Normal rate and regular rhythm.      Heart sounds: Normal heart sounds. No murmur heard.      Pulmonary:      Effort: Pulmonary effort is normal. No respiratory distress.      Breath sounds: Normal breath sounds. No stridor. No wheezing, rhonchi or rales.   Abdominal:      General: There is no distension.      Palpations: Abdomen is soft.      Tenderness: There is no abdominal tenderness.   Musculoskeletal:      Cervical back: Neck supple. " No rigidity or tenderness.      Right lower leg: No edema.      Left lower leg: No edema.   Lymphadenopathy:      Cervical: No cervical adenopathy.   Skin:     General: Skin is warm and dry.   Neurological:      Mental Status: She is alert and oriented to person, place, and time.   Psychiatric:         Mood and Affect: Mood normal.         Behavior: Behavior normal.         Thought Content: Thought content normal.         Judgment: Judgment normal.         MDM    Assessment/Plan    Diagnoses and all orders for this visit:    1. Gastroesophageal reflux disease, unspecified whether esophagitis present (Primary)  -     pantoprazole (Protonix) 40 MG EC tablet; Take 1 tablet by mouth Daily.  Dispense: 30 tablet; Refill: 5    2. Pain, dental    3. Morbid (severe) obesity due to excess calories (HCC)    4. Hyperprolactinemia (HCC)    5. Hyperthyroidism    Will try PPI trial.  If not helpful with GERD symptoms would like to refer to GI; patient voices understanding.  Asked her to let us know in 3-4 weeks how she's doing.     Her dental pain in improved at this point; encouraged her to keep the appointment for wisdom tooth removal and let us know if pain returns prior to appointment.    Patient's Body mass index is 43.77 kg/m². indicating that she is morbidly obese (BMI > 40 or > 35 with obesity - related health condition). Obesity-related health conditions include the following: GERD. Obesity is newly identified. BMI is is above average; BMI management plan is completed. We discussed low calorie, low carb based diet program, portion control and increasing exercise..    Patient to keep appointment with Dr. Rees and continue to follow up with Dr. Tidwell regarding hyperprolactinemia and hyperthyroidism. She says she will bring us copies of her latest labs to review and have on file.       Education materials and an After Visit Summary were printed and given to the patient at discharge.  Return in about 6 months (around  5/16/2022) for Annual physical with Dr. Hernandez.         Samantha Hernandez, RYAN   11:45 CST  11/16/2021

## 2021-11-16 NOTE — PROGRESS NOTES
November 16, 2021    Hello, may I speak with Dada Garcia?    My name Rani     I am  with [facility]  [address], [address]  [place] [address] [phone].    Before we get started may I verify your date of birth? 1995    I am calling to officially welcome you to our practice and ask about your recent visit. Is this a good time to talk? yes    Tell me about your visit with us. What things went well?  it was GREAT!       We're always looking for ways to make our patients' experiences even better. Do you have recommendations on ways we may improve?  no    Overall were you satisfied with your first visit to our practice? yes       I appreciate you taking the time to speak with me today. Is there anything else I can do for you? no      Thank you, and have a great day.

## 2021-11-16 NOTE — PATIENT INSTRUCTIONS
"Conn's Current Therapy 2021 (pp. 213-216). [place]: [employer].\">   Gastroesophageal Reflux Disease, Adult  Gastroesophageal reflux (BERNICE) happens when acid from the stomach flows up into the tube that connects the mouth and the stomach (esophagus). Normally, food travels down the esophagus and stays in the stomach to be digested. However, when a person has BERNICE, food and stomach acid sometimes move back up into the esophagus. If this becomes a more serious problem, the person may be diagnosed with a disease called gastroesophageal reflux disease (GERD). GERD occurs when the reflux:  · Happens often.  · Causes frequent or severe symptoms.  · Causes problems such as damage to the esophagus.  When stomach acid comes in contact with the esophagus, the acid may cause soreness (inflammation) in the esophagus. Over time, GERD may create small holes (ulcers) in the lining of the esophagus.  What are the causes?  This condition is caused by a problem with the muscle between the esophagus and the stomach (lower esophageal sphincter, or LES). Normally, the LES muscle closes after food passes through the esophagus to the stomach. When the LES is weakened or abnormal, it does not close properly, and that allows food and stomach acid to go back up into the esophagus.  The LES can be weakened by certain dietary substances, medicines, and medical conditions, including:  · Tobacco use.  · Pregnancy.  · Having a hiatal hernia.  · Alcohol use.  · Certain foods and beverages, such as coffee, chocolate, onions, and peppermint.  What increases the risk?  You are more likely to develop this condition if you:  · Have an increased body weight.  · Have a connective tissue disorder.  · Use NSAID medicines.  What are the signs or symptoms?  Symptoms of this condition include:  · Heartburn.  · Difficult or painful swallowing.  · The feeling of having a lump in the throat.  · A bitter taste in the mouth.  · Bad breath.  · Having a large " amount of saliva.  · Having an upset or bloated stomach.  · Belching.  · Chest pain. Different conditions can cause chest pain. Make sure you see your health care provider if you experience chest pain.  · Shortness of breath or wheezing.  · Ongoing (chronic) cough or a night-time cough.  · Wearing away of tooth enamel.  · Weight loss.  How is this diagnosed?  Your health care provider will take a medical history and perform a physical exam. To determine if you have mild or severe GERD, your health care provider may also monitor how you respond to treatment. You may also have tests, including:  · A test to examine your stomach and esophagus with a small camera (endoscopy).  · A test that measures the acidity level in your esophagus.  · A test that measures how much pressure is on your esophagus.  · A barium swallow or modified barium swallow test to show the shape, size, and functioning of your esophagus.  How is this treated?  The goal of treatment is to help relieve your symptoms and to prevent complications. Treatment for this condition may vary depending on how severe your symptoms are. Your health care provider may recommend:  · Changes to your diet.  · Medicine.  · Surgery.  Follow these instructions at home:  Eating and drinking    · Follow a diet as recommended by your health care provider. This may involve avoiding foods and drinks such as:  ? Coffee and tea (with or without caffeine).  ? Drinks that contain alcohol.  ? Energy drinks and sports drinks.  ? Carbonated drinks or sodas.  ? Chocolate and cocoa.  ? Peppermint and mint flavorings.  ? Garlic and onions.  ? Horseradish.  ? Spicy and acidic foods, including peppers, chili powder, pillai powder, vinegar, hot sauces, and barbecue sauce.  ? Citrus fruit juices and citrus fruits, such as oranges, brian, and limes.  ? Tomato-based foods, such as red sauce, chili, salsa, and pizza with red sauce.  ? Fried and fatty foods, such as donuts, french fries,  potato chips, and high-fat dressings.  ? High-fat meats, such as hot dogs and fatty cuts of red and white meats, such as rib eye steak, sausage, ham, and aggarwal.  ? High-fat dairy items, such as whole milk, butter, and cream cheese.  · Eat small, frequent meals instead of large meals.  · Avoid drinking large amounts of liquid with your meals.  · Avoid eating meals during the 2-3 hours before bedtime.  · Avoid lying down right after you eat.  · Do not exercise right after you eat.    Lifestyle    · Do not use any products that contain nicotine or tobacco, such as cigarettes, e-cigarettes, and chewing tobacco. If you need help quitting, ask your health care provider.  · Try to reduce your stress by using methods such as yoga or meditation. If you need help reducing stress, ask your health care provider.  · If you are overweight, reduce your weight to an amount that is healthy for you. Ask your health care provider for guidance about a safe weight loss goal.    General instructions  · Pay attention to any changes in your symptoms.  · Take over-the-counter and prescription medicines only as told by your health care provider. Do not take aspirin, ibuprofen, or other NSAIDs unless your health care provider told you to do so.  · Wear loose-fitting clothing. Do not wear anything tight around your waist that causes pressure on your abdomen.  · Raise (elevate) the head of your bed about 6 inches (15 cm).  · Avoid bending over if this makes your symptoms worse.  · Keep all follow-up visits as told by your health care provider. This is important.  Contact a health care provider if:  · You have:  ? New symptoms.  ? Unexplained weight loss.  ? Difficulty swallowing or it hurts to swallow.  ? Wheezing or a persistent cough.  ? A hoarse voice.  · Your symptoms do not improve with treatment.  Get help right away if you:  · Have pain in your arms, neck, jaw, teeth, or back.  · Feel sweaty, dizzy, or light-headed.  · Have chest pain or  "shortness of breath.  · Vomit and your vomit looks like blood or coffee grounds.  · Faint.  · Have stool that is bloody or black.  · Cannot swallow, drink, or eat.  Summary  · Gastroesophageal reflux happens when acid from the stomach flows up into the esophagus. GERD is a disease in which the reflux happens often, causes frequent or severe symptoms, or causes problems such as damage to the esophagus.  · Treatment for this condition may vary depending on how severe your symptoms are. Your health care provider may recommend diet and lifestyle changes, medicine, or surgery.  · Contact a health care provider if you have new or worsening symptoms.  · Take over-the-counter and prescription medicines only as told by your health care provider. Do not take aspirin, ibuprofen, or other NSAIDs unless your health care provider told you to do so.  · Keep all follow-up visits as told by your health care provider. This is important.  This information is not intended to replace advice given to you by your health care provider. Make sure you discuss any questions you have with your health care provider.  Document Revised: 06/26/2019 Document Reviewed: 06/26/2019  [employer] Patient Education © 2021 [employer]    Conn's Current Therapy 2021 (pp. 213-216). [place], [place]: Elsevier.\">   Gastroesophageal Reflux Disease, Adult  Gastroesophageal reflux (BERNICE) happens when acid from the stomach flows up into the tube that connects the mouth and the stomach (esophagus). Normally, food travels down the esophagus and stays in the stomach to be digested. However, when a person has BERNICE, food and stomach acid sometimes move back up into the esophagus. If this becomes a more serious problem, the person may be diagnosed with a disease called gastroesophageal reflux disease (GERD). GERD occurs when the reflux:  · Happens often.  · Causes frequent or severe symptoms.  · Causes problems such as damage to the esophagus.  When stomach acid comes in " contact with the esophagus, the acid may cause soreness (inflammation) in the esophagus. Over time, GERD may create small holes (ulcers) in the lining of the esophagus.  What are the causes?  This condition is caused by a problem with the muscle between the esophagus and the stomach (lower esophageal sphincter, or LES). Normally, the LES muscle closes after food passes through the esophagus to the stomach. When the LES is weakened or abnormal, it does not close properly, and that allows food and stomach acid to go back up into the esophagus.  The LES can be weakened by certain dietary substances, medicines, and medical conditions, including:  · Tobacco use.  · Pregnancy.  · Having a hiatal hernia.  · Alcohol use.  · Certain foods and beverages, such as coffee, chocolate, onions, and peppermint.  What increases the risk?  You are more likely to develop this condition if you:  · Have an increased body weight.  · Have a connective tissue disorder.  · Use NSAID medicines.  What are the signs or symptoms?  Symptoms of this condition include:  · Heartburn.  · Difficult or painful swallowing.  · The feeling of having a lump in the throat.  · A bitter taste in the mouth.  · Bad breath.  · Having a large amount of saliva.  · Having an upset or bloated stomach.  · Belching.  · Chest pain. Different conditions can cause chest pain. Make sure you see your health care provider if you experience chest pain.  · Shortness of breath or wheezing.  · Ongoing (chronic) cough or a night-time cough.  · Wearing away of tooth enamel.  · Weight loss.  How is this diagnosed?  Your health care provider will take a medical history and perform a physical exam. To determine if you have mild or severe GERD, your health care provider may also monitor how you respond to treatment. You may also have tests, including:  · A test to examine your stomach and esophagus with a small camera (endoscopy).  · A test that measures the acidity level in your  esophagus.  · A test that measures how much pressure is on your esophagus.  · A barium swallow or modified barium swallow test to show the shape, size, and functioning of your esophagus.  How is this treated?  The goal of treatment is to help relieve your symptoms and to prevent complications. Treatment for this condition may vary depending on how severe your symptoms are. Your health care provider may recommend:  · Changes to your diet.  · Medicine.  · Surgery.  Follow these instructions at home:  Eating and drinking    · Follow a diet as recommended by your health care provider. This may involve avoiding foods and drinks such as:  ? Coffee and tea (with or without caffeine).  ? Drinks that contain alcohol.  ? Energy drinks and sports drinks.  ? Carbonated drinks or sodas.  ? Chocolate and cocoa.  ? Peppermint and mint flavorings.  ? Garlic and onions.  ? Horseradish.  ? Spicy and acidic foods, including peppers, chili powder, pillai powder, vinegar, hot sauces, and barbecue sauce.  ? Citrus fruit juices and citrus fruits, such as oranges, brian, and limes.  ? Tomato-based foods, such as red sauce, chili, salsa, and pizza with red sauce.  ? Fried and fatty foods, such as donuts, french fries, potato chips, and high-fat dressings.  ? High-fat meats, such as hot dogs and fatty cuts of red and white meats, such as rib eye steak, sausage, ham, and aggarwal.  ? High-fat dairy items, such as whole milk, butter, and cream cheese.  · Eat small, frequent meals instead of large meals.  · Avoid drinking large amounts of liquid with your meals.  · Avoid eating meals during the 2-3 hours before bedtime.  · Avoid lying down right after you eat.  · Do not exercise right after you eat.    Lifestyle    · Do not use any products that contain nicotine or tobacco, such as cigarettes, e-cigarettes, and chewing tobacco. If you need help quitting, ask your health care provider.  · Try to reduce your stress by using methods such as yoga or  meditation. If you need help reducing stress, ask your health care provider.  · If you are overweight, reduce your weight to an amount that is healthy for you. Ask your health care provider for guidance about a safe weight loss goal.    General instructions  · Pay attention to any changes in your symptoms.  · Take over-the-counter and prescription medicines only as told by your health care provider. Do not take aspirin, ibuprofen, or other NSAIDs unless your health care provider told you to do so.  · Wear loose-fitting clothing. Do not wear anything tight around your waist that causes pressure on your abdomen.  · Raise (elevate) the head of your bed about 6 inches (15 cm).  · Avoid bending over if this makes your symptoms worse.  · Keep all follow-up visits as told by your health care provider. This is important.  Contact a health care provider if:  · You have:  ? New symptoms.  ? Unexplained weight loss.  ? Difficulty swallowing or it hurts to swallow.  ? Wheezing or a persistent cough.  ? A hoarse voice.  · Your symptoms do not improve with treatment.  Get help right away if you:  · Have pain in your arms, neck, jaw, teeth, or back.  · Feel sweaty, dizzy, or light-headed.  · Have chest pain or shortness of breath.  · Vomit and your vomit looks like blood or coffee grounds.  · Faint.  · Have stool that is bloody or black.  · Cannot swallow, drink, or eat.  Summary  · Gastroesophageal reflux happens when acid from the stomach flows up into the esophagus. GERD is a disease in which the reflux happens often, causes frequent or severe symptoms, or causes problems such as damage to the esophagus.  · Treatment for this condition may vary depending on how severe your symptoms are. Your health care provider may recommend diet and lifestyle changes, medicine, or surgery.  · Contact a health care provider if you have new or worsening symptoms.  · Take over-the-counter and prescription medicines only as told by your health  care provider. Do not take aspirin, ibuprofen, or other NSAIDs unless your health care provider told you to do so.  · Keep all follow-up visits as told by your health care provider. This is important.  This information is not intended to replace advice given to you by your health care provider. Make sure you discuss any questions you have with your health care provider.  Document Revised: 06/26/2019 Document Reviewed: 06/26/2019  [employer] Patient Education © 2021 [employer]

## 2022-11-21 ENCOUNTER — HOSPITAL ENCOUNTER (EMERGENCY)
Facility: HOSPITAL | Age: 27
Discharge: HOME | End: 2022-11-21
Admitting: EMERGENCY MEDICINE

## 2022-11-21 ENCOUNTER — APPOINTMENT (OUTPATIENT)
Dept: GENERAL RADIOLOGY | Facility: HOSPITAL | Age: 27
End: 2022-11-21

## 2022-11-21 VITALS
RESPIRATION RATE: 20 BRPM | TEMPERATURE: 98 F | HEART RATE: 76 BPM | BODY MASS INDEX: 42.32 KG/M2 | OXYGEN SATURATION: 98 % | WEIGHT: 254 LBS | DIASTOLIC BLOOD PRESSURE: 76 MMHG | HEIGHT: 65 IN | SYSTOLIC BLOOD PRESSURE: 142 MMHG

## 2022-11-21 DIAGNOSIS — J06.9 UPPER RESPIRATORY TRACT INFECTION, UNSPECIFIED TYPE: Primary | ICD-10-CM

## 2022-11-21 LAB
B-HCG UR QL: NEGATIVE
EXPIRATION DATE: NORMAL
FLUAV RNA RESP QL NAA+PROBE: NOT DETECTED
FLUBV RNA RESP QL NAA+PROBE: NOT DETECTED
INTERNAL NEGATIVE CONTROL: NORMAL
INTERNAL POSITIVE CONTROL: NORMAL
Lab: NORMAL
SARS-COV-2 RNA RESP QL NAA+PROBE: NOT DETECTED

## 2022-11-21 PROCEDURE — 87636 SARSCOV2 & INF A&B AMP PRB: CPT | Performed by: EMERGENCY MEDICINE

## 2022-11-21 PROCEDURE — 71045 X-RAY EXAM CHEST 1 VIEW: CPT

## 2022-11-21 PROCEDURE — 81025 URINE PREGNANCY TEST: CPT | Performed by: NURSE PRACTITIONER

## 2022-11-21 PROCEDURE — 99283 EMERGENCY DEPT VISIT LOW MDM: CPT

## 2022-11-21 RX ORDER — BROMPHENIRAMINE MALEATE, PSEUDOEPHEDRINE HYDROCHLORIDE, AND DEXTROMETHORPHAN HYDROBROMIDE 2; 30; 10 MG/5ML; MG/5ML; MG/5ML
5 SYRUP ORAL 4 TIMES DAILY PRN
Qty: 118 ML | Refills: 0 | Status: SHIPPED | OUTPATIENT
Start: 2022-11-21 | End: 2022-11-26

## 2022-11-22 NOTE — ED PROVIDER NOTES
Subjective   History of Present Illness  27 yof with PMH of GERD, hyperprolactinemia and benign neoplasm of pituitary gland presents with c/o cough.  She states she has been sick for several days.  She was recently dx with URI. She states her cough persists.  She has had some head congestion. She has felt hot at times but no documented fever. No sore throat.  No CP or SOB.  No n/v/d.          Review of Systems   Constitutional: Negative for activity change, appetite change, fatigue and fever.   HENT: Negative for congestion, ear pain, facial swelling and sore throat.    Eyes: Negative for discharge and visual disturbance.   Respiratory: Positive for cough. Negative for apnea, chest tightness, shortness of breath, wheezing and stridor.    Cardiovascular: Negative for chest pain and palpitations.   Gastrointestinal: Negative for abdominal distention, abdominal pain, diarrhea, nausea and vomiting.   Genitourinary: Negative for difficulty urinating and dysuria.   Musculoskeletal: Negative for arthralgias and myalgias.   Skin: Negative for rash and wound.   Neurological: Negative for dizziness and seizures.   Psychiatric/Behavioral: Negative for agitation and confusion.       Past Medical History:   Diagnosis Date   • Amenorrhea    • Benign neoplasm of pituitary gland (HCC)    • Galactorrhea not associated with childbirth     Galactorrhea due to non-obstetric cause      • GERD (gastroesophageal reflux disease)    • Gestational hypertension     IN THIRD TRIMESTER PER HX   • Hyperprolactinemia (HCC)    • Hyperthyroidism    • Prolactinoma (HCC)        No Known Allergies    History reviewed. No pertinent surgical history.    Family History   Problem Relation Age of Onset   • Heart disease Maternal Grandmother    • Cancer Paternal Grandmother    • Diabetes Paternal Grandfather        Social History     Socioeconomic History   • Marital status: Single   Tobacco Use   • Smoking status: Never   • Smokeless tobacco: Never    Substance and Sexual Activity   • Alcohol use: No   • Drug use: No   • Sexual activity: Yes     Partners: Male           Objective   Physical Exam  Vitals and nursing note reviewed.   Constitutional:       General: She is not in acute distress.     Appearance: She is well-developed. She is not ill-appearing or toxic-appearing.   HENT:      Head: Normocephalic.   Eyes:      Pupils: Pupils are equal, round, and reactive to light.   Cardiovascular:      Rate and Rhythm: Normal rate and regular rhythm.      Heart sounds: No murmur heard.  Pulmonary:      Effort: Pulmonary effort is normal. No respiratory distress.      Breath sounds: Normal breath sounds. No wheezing, rhonchi or rales.   Abdominal:      General: Bowel sounds are normal. There is no distension.      Palpations: Abdomen is soft.      Tenderness: There is no abdominal tenderness. There is no right CVA tenderness or left CVA tenderness.   Musculoskeletal:         General: Normal range of motion.      Cervical back: Normal range of motion and neck supple.   Skin:     General: Skin is warm and dry.   Neurological:      Mental Status: She is alert and oriented to person, place, and time.         Procedures           ED Course  ED Course as of 11/22/22 0005   Mon Nov 21, 2022   2329 Chest xray unremarkable per Dr Junior.    Covid and flu are negative.  I reviewed results and plan of care with her.  She voiced understanding of both results and instructions. [KS]      ED Course User Index  [KS] Rivera Leblanc APRN                                           MDM  Number of Diagnoses or Management Options  Upper respiratory tract infection, unspecified type: minor     Amount and/or Complexity of Data Reviewed  Clinical lab tests: ordered and reviewed  Tests in the radiology section of CPT®: ordered and reviewed  Discuss the patient with other providers: yes    Risk of Complications, Morbidity, and/or Mortality  Presenting problems: minimal  Diagnostic  procedures: minimal  Management options: minimal    Patient Progress  Patient progress: stable      Final diagnoses:   Upper respiratory tract infection, unspecified type       ED Disposition  ED Disposition     ED Disposition   Discharge    Condition   Stable    Comment   --             Samantha Hernandez, APRN  [address]  [facility] [address]  [phone]    Call in 1 day  Routine ED Follow up         Medication List      New Prescriptions    brompheniramine-pseudoephedrine-DM 30-2-10 MG/5ML syrup  Take 5 mL by mouth 4 (Four) Times a Day As Needed for Allergies for up to 5 days.           Where to Get Your Medications      These medications were sent to [facility] - [place], [place] - [address] - [phone]  - [phone] [address] [address]    Phone: [phone]   · brompheniramine-pseudoephedrine-DM 30-2-10 MG/5ML syrup          Rivera Leblanc, RYAN  11/22/22 0005

## 2022-11-22 NOTE — DISCHARGE INSTRUCTIONS
Drink plenty of fluid.  Tylenol or motrin as needed for pain/fever.  Medication as ordered. Follow up with PCP -call tomorrow for appointment. Return to ED if condition does not improve or worsens

## 2022-12-23 ENCOUNTER — NURSE TRIAGE (OUTPATIENT)
Dept: CALL CENTER | Facility: HOSPITAL | Age: 27
End: 2022-12-23

## 2022-12-23 NOTE — TELEPHONE ENCOUNTER
"Caller 7 weeks pregnant with nausea and vomiting 6 times today after eating heavy meal and drinking a drink that irrigated her stomach. Triager advised per protocol for pregnancy with morning sickness. Patient agreed.     Reason for Disposition  • MILD-MODERATE vomiting (e.g., 1-5 times / day) or nausea    Additional Information  • Negative: Sounds like a life-threatening emergency to the triager  • Negative: [1] Vaginal bleeding AND [2] pregnant < 20 weeks  • Negative: [1] Abdominal pain AND [2] pregnant < 20 weeks  • Negative: Headache is main symptom  • Negative: [1] Vomiting did NOT begin in early pregnancy (i.e., 4th-8th week of pregnancy) OR [2] 20 or more weeks pregnant at time of call  • Negative: [1] Vomiting AND [2] contains red blood or black (\"coffee ground\") material  (Exception: few red streaks in vomit that only happened once)  • Negative: [1] Insulin-dependent diabetes (Type I) AND [2] glucose > 400 mg/dl (22 mmol/l)  • Negative: Recent head injury (within last 3 days)  • Negative: Recent abdominal injury (within last 3 days)  • Negative: Severe pain in one eye  • Negative: [1] SEVERE vomiting (e.g., 6 or more times/day) AND [2] present > 8 hours  • Negative: [1] Drinking very little AND [2] dehydration suspected (e.g., no urine > 12 hours, very dry mouth, very lightheaded)  • Negative: Patient sounds very sick or weak to the triager  • Negative: [1] Unable to keep ANY liquids down (without vomiting) AND [2] present > 24 hours  • Negative: Weight loss > 5 pounds (2.5 kg) in last 2 weeks  • Negative: Vomiting an essential or prescribed medication (Exception: prenatal vitamins)  • Negative: Recently started on a new medication  • Negative: [1] MODERATE vomiting (e.g., 3-5 times / day) AND [2] present > 3 days  • Negative: Pain or burning with passing urine (urination)  • Negative: Substance use (drug use) or unhealthy alcohol use, known or suspected  • Negative: High-risk adult (e.g., diabetes " "mellitus, AIDS/HIV)  • Negative: [1] MILD vomiting (e.g., 1-2 times / day) AND [2] present > 3 days AND [3] started after the 9th week of pregnancy  • Negative: [1] MILD vomiting AND [2] present > 1 week AND [3] no improvement after using Morning Sickness Care Advice    Answer Assessment - Initial Assessment Questions  1. VOMITING SEVERITY: \"How many times have you vomited in the past 24 hours?\"      - MILD:  1 - 2 times/day     - MODERATE: 3 - 5 times/day, decreased oral intake without significant weight loss or symptoms of dehydration     - SEVERE: 6 or more times/day, vomits everything or nearly everything, with significant weight loss, symptoms of dehydration       Severe  2. ONSET: \"When did the vomiting begin?\"      Morning sickness started experiencing Morning sickness 2 weeks ago   3. FLUIDS: \"What fluids or food have you vomited up today?\" \"Are you able to keep any liquids down?\"  Drank red drink and chicken wings  4. TREATMENT: \"What have you been doing so far to treat this?\"    Drinking water, lay back and relax  5. DEHYDRATION: \"When was the last time you urinated?\" \"Are you feeling lightheaded?\" \"Weight loss?\"    20 minutes ago  6. PREGNANCY: \"How many weeks pregnant are you?\" \"How has the pregnancy been going?\"   7 weeks fatigue and nausea  7. CHELSEY: \"What date are you expecting to deliver?\"   08/13/2023  8. MEDICATIONS: \"What medications are you taking?\" (e.g., prenatal vitamins, iron)   prenatal vitamins, promethazine and Vit D  9. OTHER SYMPTOMS: \"Do you have any other symptoms?\"  No    Protocols used: PREGNANCY - MORNING SICKNESS (NAUSEA AND VOMITING OF PREGNANCY)-ADULT-AH      "

## 2022-12-29 ENCOUNTER — TELEPHONE (OUTPATIENT)
Dept: BARIATRICS/WEIGHT MGMT | Facility: CLINIC | Age: 27
End: 2022-12-29

## 2022-12-29 NOTE — TELEPHONE ENCOUNTER
I tried to contact the patient regarding her new patient appt but she wasn't available and doesn't have voicemail as an option.

## 2023-02-08 ENCOUNTER — REFERRAL TRIAGE (OUTPATIENT)
Dept: LABOR AND DELIVERY | Facility: HOSPITAL | Age: 28
End: 2023-02-08
Payer: MEDICAID

## 2023-02-08 ENCOUNTER — PATIENT OUTREACH (OUTPATIENT)
Dept: LABOR AND DELIVERY | Facility: HOSPITAL | Age: 28
End: 2023-02-08
Payer: MEDICAID

## 2023-02-08 NOTE — OUTREACH NOTE
[facility]  Enrollment    Current Estimated Gestational Age: 13w3d    Questions/Answers    Flowsheet Row Responses   Would like to participate? Yes   Date of Intake Visit 02/08/23          [facility]  Intake    Current Estimated Gestational Age: 13w3d    Intake Assessment    Flowsheet Row Responses   Best Method for Contacting Cell   Currently Employed No   Able to keep appointments as scheduled Yes   Gender(s) and Name(s) FEMALE   Do you have a dentist? No  [DENTAL CLINICS IN RESOURCE LETTER]   Resources Presently Utilizing: None   Maternal Warning Signs Provided  [SENT AS ATTACHMENT IN MYCHART]   Other: Provided  [SECOND TRIMESTER OF PREGNANCY AND ROUND LIGAMENT PAIN SENT IN AVS]   Other Education HANDS, How to find a dentist, How to find a pediatrician, How to find a primary care provider, Insurance benefits/Incentives, Meds to Beds, Mental Health Services, Smoking/Vaping Cessation, SNAP Benefits, Substance Use Disorder Treatment, Transportation Assistance, WIC Benefits  [SENT TO CLIENT IN RESOURCE LETTER]          Learning Assessment    Flowsheet Row Responses   Relationship Patient   Does the learner have any barriers to learning? No Barriers   What is the preferred language of the learner for medical teaching? English   How does the learner prefer to learn new concepts? Listening, Reading, Demonstration, Pictures/Video              Referral submitted to the following resources (verbal consent received to submit demographic information):     HANDS      Tigist Harrison RN  Maternity Nurse Navigator    2/8/2023, 12:56 CST          Tigist Harrison RN  Maternity Nurse Navigator    2/8/2023, 12:56 CST

## 2023-02-08 NOTE — PATIENT INSTRUCTIONS
[facility]'s [facility]    Many moms-to-be feel a bit better from weeks 13 to 28 of their pregnancy. You may not be as tired or nauseated as you were during your first trimester. Your baby is doing a lot of growing during this time, though. You'll typically continue seeing your prenatal provider every four weeks. Most pregnant women feel energetic and healthy during this trimester    You'll undergo a few routine tests during this period of your pregnancy:  Rh testing: Your provider will test your blood's Rhesus (Rh) factor, which is an immune system-related protein most people have in their blood.    Oral glucose challenge test: Between your 24th and 28th weeks of pregnancy, you'll visit a lab and drink a sweet liquid (called Glucola) to test for gestational diabetes. The test takes about an hour. Gestational diabetes is a unique type of blood sugar condition that only develops when you're pregnant.     Ultrasound: This safe test uses sound waves to create images of your baby. It can help detect some medical conditions your baby might be developing, such as cleft lip/cleft palate. However, it can also be a very exciting test because your ultrasound technician may be able to tell you your baby's gender if you want to know.     If 4-D ultrasound is available, you may be able to see your baby moving or sucking his or her thumb.     Amniocentesis: This test gives your doctor or midwife more detailed information about your baby's health. However, not every expectant mom needs this test, which involves drawing a sample of the amniotic fluid surrounding your baby. Your provider might recommend this test if you:    Had concerning screening test results earlier in your pregnancy.    Have been pregnant before with a baby who had medical issues.    Are age 35 or older.    Call your healthcare provider or go to the nearest emergency room if you experience any of these symptoms:  Intense cramping or abdominal pain    A significant decrease in your baby's movement after week 28 (fewer than about 6 to 10 movements in an hour).  Shortness of breath or trouble breathing.  Tightening or pain in your back or lower abdomen that could be contractions.  Feeling of intense pressure in your pelvis or vagina.  Any vaginal bleeding or leaking fluid.    Things to start thinking of:  Feeding plan for your [place]  Choosing a Pediatrician for your     Breastfeeding:   Insurance will pay for one breast pump per pregnancy. You can check with your insurance for coverage limits and to order through their preferred company or you can use any of the following websites:     The web sites listed can be used to order a breast pump:    https://[employer]/    https://www.[employer]ak[employer]    https://www.[employer]/    Information and support for breastfeeding can be found here:    La Leche League International:  https://www.llli.org/    WIC Breastfeeding Support: https://wicbreastfeeding.fns.usda.gov/    [facility]: https://TriHealths.ky.gov/agencies/dph/dmch/nsb/Pages/breastfeeding.aspx    : https://www.marchofdimes.org/baby/breastfeeding-your-baby     Department of Health & Human Services: https://www.womenshealth.gov/breastfeeding/learning-breastfeed/finding-breastfeeding-support-and-information    [facility] Lactation Consultant  If you have questions or concerns about breastfeeding, you can contact  Henny ABBASI  [facility]  [phone]    Formula feeding    For formula feeding, you can find useful information on these websites:    : https://www.marchofdimes.org/baby/feeding-your-baby-formula.aspx    Center for Disease Control and Prevention: https://www.cdc.gov/nutrition/infantandtoddlernutrition/formula-feeding/index.htm    Kids Health Organization: https://kidshealth.org/en/parents/formulafeed-starting.html    MothersBabies:  https://www.mothersbabies.org/baby/?gclid=GREhYNpoIiLP6kggiSct5VAFgS-[email or web address]_BwE

## 2023-02-10 ENCOUNTER — HOSPITAL ENCOUNTER (EMERGENCY)
Facility: HOSPITAL | Age: 28
Discharge: HOME | End: 2023-02-10
Admitting: STUDENT IN AN ORGANIZED HEALTH CARE EDUCATION/TRAINING PROGRAM
Payer: MEDICAID

## 2023-02-10 ENCOUNTER — APPOINTMENT (OUTPATIENT)
Dept: ULTRASOUND IMAGING | Facility: HOSPITAL | Age: 28
End: 2023-02-10
Payer: MEDICAID

## 2023-02-10 VITALS
HEIGHT: 65 IN | HEART RATE: 90 BPM | RESPIRATION RATE: 18 BRPM | SYSTOLIC BLOOD PRESSURE: 141 MMHG | BODY MASS INDEX: 41.65 KG/M2 | WEIGHT: 250 LBS | OXYGEN SATURATION: 98 % | TEMPERATURE: 98.2 F | DIASTOLIC BLOOD PRESSURE: 84 MMHG

## 2023-02-10 DIAGNOSIS — R10.30 LOWER ABDOMINAL PAIN: Primary | ICD-10-CM

## 2023-02-10 DIAGNOSIS — Z3A.13 13 WEEKS GESTATION OF PREGNANCY: ICD-10-CM

## 2023-02-10 LAB
ALBUMIN SERPL-MCNC: 4.1 G/DL (ref 3.5–5.2)
ALBUMIN/GLOB SERPL: 1.3 G/DL
ALP SERPL-CCNC: 43 U/L (ref 39–117)
ALT SERPL W P-5'-P-CCNC: 27 U/L (ref 1–33)
ANION GAP SERPL CALCULATED.3IONS-SCNC: 8 MMOL/L (ref 5–15)
AST SERPL-CCNC: 18 U/L (ref 1–32)
BACTERIA UR QL AUTO: ABNORMAL /HPF
BASOPHILS # BLD AUTO: 0.05 10*3/MM3 (ref 0–0.2)
BASOPHILS NFR BLD AUTO: 0.5 % (ref 0–1.5)
BILIRUB SERPL-MCNC: 0.4 MG/DL (ref 0–1.2)
BILIRUB UR QL STRIP: NEGATIVE
BUN SERPL-MCNC: 6 MG/DL (ref 6–20)
BUN/CREAT SERPL: 9.5 (ref 7–25)
CALCIUM SPEC-SCNC: 9.4 MG/DL (ref 8.6–10.5)
CHLORIDE SERPL-SCNC: 106 MMOL/L (ref 98–107)
CLARITY UR: CLEAR
CO2 SERPL-SCNC: 24 MMOL/L (ref 22–29)
COLOR UR: YELLOW
CREAT SERPL-MCNC: 0.63 MG/DL (ref 0.57–1)
DEPRECATED RDW RBC AUTO: 42.1 FL (ref 37–54)
EGFRCR SERPLBLD CKD-EPI 2021: 123.3 ML/MIN/1.73
EOSINOPHIL # BLD AUTO: 0.05 10*3/MM3 (ref 0–0.4)
EOSINOPHIL NFR BLD AUTO: 0.5 % (ref 0.3–6.2)
ERYTHROCYTE [DISTWIDTH] IN BLOOD BY AUTOMATED COUNT: 12.5 % (ref 12.3–15.4)
GLOBULIN UR ELPH-MCNC: 3.2 GM/DL
GLUCOSE SERPL-MCNC: 107 MG/DL (ref 65–99)
GLUCOSE UR STRIP-MCNC: NEGATIVE MG/DL
HCT VFR BLD AUTO: 38.1 % (ref 34–46.6)
HGB BLD-MCNC: 12.5 G/DL (ref 12–15.9)
HGB UR QL STRIP.AUTO: ABNORMAL
HYALINE CASTS UR QL AUTO: ABNORMAL /LPF
IMM GRANULOCYTES # BLD AUTO: 0.09 10*3/MM3 (ref 0–0.05)
IMM GRANULOCYTES NFR BLD AUTO: 0.8 % (ref 0–0.5)
KETONES UR QL STRIP: ABNORMAL
LEUKOCYTE ESTERASE UR QL STRIP.AUTO: NEGATIVE
LYMPHOCYTES # BLD AUTO: 3.14 10*3/MM3 (ref 0.7–3.1)
LYMPHOCYTES NFR BLD AUTO: 29.4 % (ref 19.6–45.3)
MCH RBC QN AUTO: 30.2 PG (ref 26.6–33)
MCHC RBC AUTO-ENTMCNC: 32.8 G/DL (ref 31.5–35.7)
MCV RBC AUTO: 92 FL (ref 79–97)
MONOCYTES # BLD AUTO: 0.65 10*3/MM3 (ref 0.1–0.9)
MONOCYTES NFR BLD AUTO: 6.1 % (ref 5–12)
MUCOUS THREADS URNS QL MICRO: ABNORMAL /HPF
NEUTROPHILS NFR BLD AUTO: 6.69 10*3/MM3 (ref 1.7–7)
NEUTROPHILS NFR BLD AUTO: 62.7 % (ref 42.7–76)
NITRITE UR QL STRIP: NEGATIVE
NRBC BLD AUTO-RTO: 0 /100 WBC (ref 0–0.2)
PH UR STRIP.AUTO: 6 [PH] (ref 5–8)
PLATELET # BLD AUTO: 275 10*3/MM3 (ref 140–450)
PMV BLD AUTO: 10.2 FL (ref 6–12)
POTASSIUM SERPL-SCNC: 4 MMOL/L (ref 3.5–5.2)
PROT SERPL-MCNC: 7.3 G/DL (ref 6–8.5)
PROT UR QL STRIP: NEGATIVE
RBC # BLD AUTO: 4.14 10*6/MM3 (ref 3.77–5.28)
RBC # UR STRIP: ABNORMAL /HPF
REF LAB TEST METHOD: ABNORMAL
SODIUM SERPL-SCNC: 138 MMOL/L (ref 136–145)
SP GR UR STRIP: >=1.03 (ref 1–1.03)
SQUAMOUS #/AREA URNS HPF: ABNORMAL /HPF
UROBILINOGEN UR QL STRIP: ABNORMAL
WBC # UR STRIP: ABNORMAL /HPF
WBC NRBC COR # BLD: 10.67 10*3/MM3 (ref 3.4–10.8)

## 2023-02-10 PROCEDURE — 80053 COMPREHEN METABOLIC PANEL: CPT | Performed by: NURSE PRACTITIONER

## 2023-02-10 PROCEDURE — 87086 URINE CULTURE/COLONY COUNT: CPT | Performed by: NURSE PRACTITIONER

## 2023-02-10 PROCEDURE — 99283 EMERGENCY DEPT VISIT LOW MDM: CPT

## 2023-02-10 PROCEDURE — 85025 COMPLETE CBC W/AUTO DIFF WBC: CPT | Performed by: NURSE PRACTITIONER

## 2023-02-10 PROCEDURE — 81001 URINALYSIS AUTO W/SCOPE: CPT | Performed by: NURSE PRACTITIONER

## 2023-02-10 PROCEDURE — 76815 OB US LIMITED FETUS(S): CPT

## 2023-02-10 PROCEDURE — 87147 CULTURE TYPE IMMUNOLOGIC: CPT | Performed by: NURSE PRACTITIONER

## 2023-02-10 RX ORDER — SODIUM CHLORIDE 0.9 % (FLUSH) 0.9 %
10 SYRINGE (ML) INJECTION AS NEEDED
Status: DISCONTINUED | OUTPATIENT
Start: 2023-02-10 | End: 2023-02-10 | Stop reason: HOSPADM

## 2023-02-10 NOTE — ED PROVIDER NOTES
Subjective   History of Present Illness  Patient is a 27-year-old black female presents emergency department stating she has not felt her baby move since yesterday.  She states she is 13 weeks pregnant and had been feeling the baby move until yesterday.  She denies any vaginal bleeding.  She states she is having some mild lower abdominal cramping.  She is  2 para 1.  She also states she is got a headache to the right side of her head.  She does have a history of headaches.  She denies any blurred vision or diplopia.  No numbness or.  She thinks it is just from worrying about her baby.  She states that about 2 weeks ago she was advised that the baby has Toro syndrome and she has been worried about that as well.    History provided by:  Patient   used: No        Review of Systems   Constitutional: Negative.    Eyes: Negative.    Respiratory: Negative.    Cardiovascular: Negative.    Gastrointestinal: Negative.    Endocrine: Negative.    Genitourinary: Negative.         Patient is a 27-year-old black female presents emergency department stating she has not felt her baby move since yesterday.  She states she is 13 weeks pregnant and had been feeling the baby move until yesterday.  She denies any vaginal bleeding.  She states she is having some mild lower abdominal cramping.  She is  2 para 1.  She also states she is got a headache to the right side of her head.  She does have a history of headaches.  She denies any blurred vision or diplopia.  No numbness or.  She thinks it is just from worrying about her baby.  She states that about 2 weeks ago she was advised that the baby has Toro syndrome and she has been worried about that as well.     Musculoskeletal: Negative.    Skin: Negative.    Allergic/Immunologic: Negative.    Neurological: Positive for headaches.   Hematological: Negative.    Psychiatric/Behavioral: Negative.    All other systems reviewed and are negative.      Past  "Medical History:   Diagnosis Date   • Amenorrhea    • Benign neoplasm of pituitary gland (HCC)    • Galactorrhea not associated with childbirth     Galactorrhea due to non-obstetric cause      • GERD (gastroesophageal reflux disease)    • Gestational hypertension     IN THIRD TRIMESTER PER HX   • Hyperprolactinemia (HCC)    • Hyperthyroidism    • Prolactinoma (HCC)        No Known Allergies    Past Surgical History:   Procedure Laterality Date   • NO PAST SURGERIES         Family History   Problem Relation Age of Onset   • Heart disease Maternal Grandmother    • Cancer Paternal Grandmother    • Diabetes Paternal Grandfather        Social History     Socioeconomic History   • Marital status: Single   Tobacco Use   • Smoking status: Never   • Smokeless tobacco: Never   Substance and Sexual Activity   • Alcohol use: No   • Drug use: No   • Sexual activity: Yes     Partners: Male       Prior to Admission medications    Medication Sig Start Date End Date Taking? Authorizing Provider   pantoprazole (Protonix) 40 MG EC tablet Take 1 tablet by mouth Daily. 11/16/21   Samantha Hernandez, RYAN       /84 (BP Location: Right arm, Patient Position: Sitting)   Pulse 90   Temp 98.2 °F (36.8 °C) (Oral)   Resp 18   Ht 165.1 cm (65\")   Wt 113 kg (250 lb)   SpO2 98%   BMI 41.60 kg/m²     Objective   Physical Exam  Vitals and nursing note reviewed.   Constitutional:       Appearance: She is well-developed.      Comments: Nontoxic-appearing.  No acute distress   HENT:      Head: Normocephalic and atraumatic.   Eyes:      Conjunctiva/sclera: Conjunctivae normal.      Pupils: Pupils are equal, round, and reactive to light.   Neck:      Thyroid: No thyromegaly.      Trachea: No tracheal deviation.   Cardiovascular:      Rate and Rhythm: Normal rate and regular rhythm.      Heart sounds: Normal heart sounds.   Pulmonary:      Effort: Pulmonary effort is normal. No respiratory distress.      Breath sounds: Normal breath sounds. No " wheezing or rales.   Chest:      Chest wall: No tenderness.   Abdominal:      General: Bowel sounds are normal.      Palpations: Abdomen is soft.   Musculoskeletal:         General: Normal range of motion.      Cervical back: Normal range of motion and neck supple.   Skin:     General: Skin is warm and dry.   Neurological:      Mental Status: She is alert and oriented to person, place, and time.      Cranial Nerves: No cranial nerve deficit.      Deep Tendon Reflexes: Reflexes are normal and symmetric.   Psychiatric:         Behavior: Behavior normal.         Thought Content: Thought content normal.         Judgment: Judgment normal.         Procedures         Lab Results (last 24 hours)     Procedure Component Value Units Date/Time    Urinalysis With Culture If Indicated - Urine, Clean Catch [[id number]]  (Abnormal) Collected: 02/10/23 1206    Specimen: Urine, Clean Catch Updated: 02/10/23 1234     Color, UA Yellow     Appearance, UA Clear     pH, UA 6.0     Specific Gravity, UA >=1.030     Glucose, UA Negative     Ketones, UA 15 mg/dL (1+)     Bilirubin, UA Negative     Blood, UA Trace     Protein, UA Negative     Leuk Esterase, UA Negative     Nitrite, UA Negative     Urobilinogen, UA 0.2 E.U./dL    Narrative:      In absence of clinical symptoms, the presence of pyuria, bacteria, and/or nitrites on the urinalysis result does not correlate with infection.    Urine Culture - Urine, Urine, Clean Catch [[id number]] Collected: 02/10/23 1206    Specimen: Urine, Clean Catch Updated: 02/10/23 1221    Urinalysis, Microscopic Only - Urine, Clean Catch [[id number]]  (Abnormal) Collected: 02/10/23 1206    Specimen: Urine, Clean Catch Updated: 02/10/23 1234     RBC, UA 0-2 /HPF      WBC, UA 6-12 /HPF      Bacteria, UA Trace /HPF      Squamous Epithelial Cells, UA 0-2 /HPF      Hyaline Casts, UA None Seen /LPF      Mucus, UA Moderate/2+ /HPF      Methodology Manual Light Microscopy    CBC & Differential [[id number]]  (Abnormal)  Collected: 02/10/23 1210    Specimen: Blood Updated: 02/10/23 1218    Narrative:      The following orders were created for panel order CBC & Differential.  Procedure                               Abnormality         Status                     ---------                               -----------         ------                     CBC Auto Differential[[id number]]        Abnormal            Final result                 Please view results for these tests on the individual orders.    Comprehensive Metabolic Panel [[id number]]  (Abnormal) Collected: 02/10/23 1210    Specimen: Blood Updated: 02/10/23 1238     Glucose 107 mg/dL      BUN 6 mg/dL      Creatinine 0.63 mg/dL      Sodium 138 mmol/L      Potassium 4.0 mmol/L      Chloride 106 mmol/L      CO2 24.0 mmol/L      Calcium 9.4 mg/dL      Total Protein 7.3 g/dL      Albumin 4.1 g/dL      ALT (SGPT) 27 U/L      AST (SGOT) 18 U/L      Alkaline Phosphatase 43 U/L      Total Bilirubin 0.4 mg/dL      Globulin 3.2 gm/dL      A/G Ratio 1.3 g/dL      BUN/Creatinine Ratio 9.5     Anion Gap 8.0 mmol/L      eGFR 123.3 mL/min/1.73     Narrative:      GFR Normal >60  Chronic Kidney Disease <60  Kidney Failure <15      CBC Auto Differential [[id number]]  (Abnormal) Collected: 02/10/23 1210    Specimen: Blood Updated: 02/10/23 1218     WBC 10.67 10*3/mm3      RBC 4.14 10*6/mm3      Hemoglobin 12.5 g/dL      Hematocrit 38.1 %      MCV 92.0 fL      MCH 30.2 pg      MCHC 32.8 g/dL      RDW 12.5 %      RDW-SD 42.1 fl      MPV 10.2 fL      Platelets 275 10*3/mm3      Neutrophil % 62.7 %      Lymphocyte % 29.4 %      Monocyte % 6.1 %      Eosinophil % 0.5 %      Basophil % 0.5 %      Immature Grans % 0.8 %      Neutrophils, Absolute 6.69 10*3/mm3      Lymphocytes, Absolute 3.14 10*3/mm3      Monocytes, Absolute 0.65 10*3/mm3      Eosinophils, Absolute 0.05 10*3/mm3      Basophils, Absolute 0.05 10*3/mm3      Immature Grans, Absolute 0.09 10*3/mm3      nRBC 0.0 /100 WBC           US Ob Limited  1 + Fetuses   Final Result          ED Course  ED Course as of 02/10/23 1922   Fri Feb 10, 2023   1401 OB ultrasound shows a 13-week 5-day intrauterine pregnancy with fetal heart tones at 155.  No abnormalities noted on this ultrasound.  Her laboratory studies were unremarkable.  Reviewed the results of testing with the patient.  She has an appointment to follow-up with her OB within the next 2 weeks.  She will be discharged home shortly in stable condition. [CW]      ED Course User Index  [CW] Светлана Chavez APRN        Medical Decision Making  Patient is a 27-year-old black female presents emergency department stating she has not felt her baby move since yesterday.  She states she is 13 weeks pregnant and had been feeling the baby move until yesterday.  She denies any vaginal bleeding.  She states she is having some mild lower abdominal cramping.  She is  2 para 1.  She also states she is got a headache to the right side of her head.  She does have a history of headaches.  She denies any blurred vision or diplopia.  No numbness or.  She thinks it is just from worrying about her baby.  She states that about 2 weeks ago she was advised that the baby has Toro syndrome and she has been worried about that as well.  Course of treatment emergency department.  Her laboratory studies are unremarkable.  OB ultrasound shows a 13-week 5-day IUP with fetal heart tones 155.  There is no abnormalities noted on ultrasound.  Advised the patient she needs to follow-up with her OB.  She states she felt much better after she knew that the baby was okay.      13 weeks gestation of pregnancy: acute illness or injury  Lower abdominal pain: acute illness or injury  Amount and/or Complexity of Data Reviewed  Labs: ordered. Decision-making details documented in ED Course.  Radiology: ordered. Decision-making details documented in ED Course.           Final diagnoses:   Lower abdominal pain   13 weeks gestation of pregnancy           Светлана Chavez, APRN  02/10/23 1923

## 2023-02-11 LAB — BACTERIA SPEC AEROBE CULT: ABNORMAL

## 2023-04-20 ENCOUNTER — HOSPITAL ENCOUNTER (OUTPATIENT)
Facility: HOSPITAL | Age: 28
Discharge: HOME | End: 2023-04-20
Attending: OBSTETRICS & GYNECOLOGY | Admitting: OBSTETRICS & GYNECOLOGY
Payer: MEDICAID

## 2023-04-20 ENCOUNTER — NURSE TRIAGE (OUTPATIENT)
Dept: CALL CENTER | Facility: HOSPITAL | Age: 28
End: 2023-04-20
Payer: MEDICAID

## 2023-04-20 VITALS
HEART RATE: 94 BPM | DIASTOLIC BLOOD PRESSURE: 58 MMHG | TEMPERATURE: 98.2 F | SYSTOLIC BLOOD PRESSURE: 121 MMHG | OXYGEN SATURATION: 98 % | RESPIRATION RATE: 18 BRPM

## 2023-04-20 PROCEDURE — G0463 HOSPITAL OUTPT CLINIC VISIT: HCPCS

## 2023-04-20 PROCEDURE — G0378 HOSPITAL OBSERVATION PER HR: HCPCS

## 2023-04-20 RX ADMIN — SODIUM CHLORIDE, POTASSIUM CHLORIDE, SODIUM LACTATE AND CALCIUM CHLORIDE 1000 ML: 600; 310; 30; 20 INJECTION, SOLUTION INTRAVENOUS at 16:50

## 2023-04-20 NOTE — TELEPHONE ENCOUNTER
Patient c/o heart palpitations and dizziness with exertion and upon rising quickly. Patient is 24 weeks pregnant. Reviewed protocol with patient. Advised patient to call PCP, call OB/GYN, or go to Urgent Care for evaluation. Patient verbalizes agreement with plan.    Reason for Disposition  • [1] Skipped or extra beat(s) AND [2] increases with exercise or exertion    Additional Information  • Negative: Passed out (i.e., lost consciousness, collapsed and was not responding)  • Negative: Shock suspected (e.g., cold/pale/clammy skin, too weak to stand, low BP, rapid pulse)  • Negative: Difficult to awaken or acting confused (e.g., disoriented, slurred speech)  • Negative: Visible sweat on face or sweat dripping down face  • Negative: Unable to walk, or can only walk with assistance (e.g., requires support)  • Negative: [1] Received SHOCK from implantable cardiac defibrillator AND [2] persisting symptoms (i.e., palpitations, lightheadedness)  • Negative: [1] Dizziness, lightheadedness, or weakness AND [2] heart beating very rapidly (e.g., > 140 / minute)  • Negative: [1] Dizziness, lightheadedness, or weakness AND [2] heart beating very slowly (e.g., < 50 / minute)  • Negative: Sounds like a life-threatening emergency to the triager  • Negative: Chest pain  • Negative: Implantable Cardiac Defibrillator (ICD) or a pacemaker symptoms or questions  • Negative: Difficulty breathing  • Negative: Dizziness, lightheadedness, or weakness  • Negative: [1] Heart beating very rapidly (e.g., > 140 / minute) AND [2] present now  (Exception: During exercise.)  • Negative: Heart beating very slowly (e.g., < 50 / minute)  (Exception: Athlete and heart rate normal for caller.)  • Negative: New or worsened shortness of breath with activity (dyspnea on exertion)  • Negative: Patient sounds very sick or weak to the triager  • Negative: [1] Heart beating very rapidly (e.g., > 140 / minute) AND [2] not present now  (Exception: During  "exercise.)    Answer Assessment - Initial Assessment Questions  1. DESCRIPTION: \"Please describe your heart rate or heartbeat that you are having\" (e.g., fast/slow, regular/irregular, skipped or extra beats, \"palpitations\")      Heartbeat is fast with minimal exertion  2. ONSET: \"When did it start?\" (Minutes, hours or days)       Worse over past couple of weeks  3. DURATION: \"How long does it last\" (e.g., seconds, minutes, hours)      4-5 minutes  4. PATTERN \"Does it come and go, or has it been constant since it started?\"  \"Does it get worse with exertion?\"   \"Are you feeling it now?\"      Comes and goes  5. TAP: \"Using your hand, can you tap out what you are feeling on a chair or table in front of you, so that I can hear?\" (Note: not all patients can do this)        NA  6. HEART RATE: \"Can you tell me your heart rate?\" \"How many beats in 15 seconds?\"  (Note: not all patients can do this)        NA  7. RECURRENT SYMPTOM: \"Have you ever had this before?\" If Yes, ask: \"When was the last time?\" and \"What happened that time?\"       No   8. CAUSE: \"What do you think is causing the palpitations?\"      Pregnancy   9. CARDIAC HISTORY: \"Do you have any history of heart disease?\" (e.g., heart attack, angina, bypass surgery, angioplasty, arrhythmia)       No   10. OTHER SYMPTOMS: \"Do you have any other symptoms?\" (e.g., dizziness, chest pain, sweating, difficulty breathing)        Mild dizziness  11. PREGNANCY: \"Is there any chance you are pregnant?\" \"When was your last menstrual period?\"        24 weeks    Protocols used: HEART RATE AND HEARTBEAT QUESTIONS-ADULT-AH    "

## 2023-04-21 PROBLEM — Z34.90 PREGNANCY: Status: ACTIVE | Noted: 2023-04-21

## 2023-05-04 ENCOUNTER — NURSE TRIAGE (OUTPATIENT)
Dept: CALL CENTER | Facility: HOSPITAL | Age: 28
End: 2023-05-04
Payer: MEDICAID

## 2023-05-04 ENCOUNTER — HOSPITAL ENCOUNTER (OUTPATIENT)
Facility: HOSPITAL | Age: 28
Discharge: HOME | End: 2023-05-04
Attending: OBSTETRICS & GYNECOLOGY | Admitting: OBSTETRICS & GYNECOLOGY
Payer: MEDICAID

## 2023-05-04 PROCEDURE — G0463 HOSPITAL OUTPT CLINIC VISIT: HCPCS

## 2023-05-04 NOTE — TELEPHONE ENCOUNTER
"Referred to LDR now    Reason for Disposition  • Leakage of fluid from vagina    Additional Information  • Negative: SEVERE constant abdominal pain (e.g., excruciating)  • Negative: SEVERE bleeding (e.g., continuous red blood from vagina, or large blood clots)  • Negative: Umbilical cord hanging out of the vagina (shiny, white, curled appearance, \"like telephone cord\")  • Negative: Uncontrollable urge to push (i.e., feels like baby is coming out now)  • Negative: Can see baby  • Negative: Sounds like a life-threatening emergency to the triager  • Negative: < 20 weeks pregnant  • Negative: Vaginal bleeding    Answer Assessment - Initial Assessment Questions  1. ONSET: \"When did the symptoms begin?\"         Today 12:30 pm  2. CONTRACTIONS: \"Are you having any contractions?\" If yes, ask: \"Describe the contractions that you are having.\" (e.g., duration, frequency, regularity, severity)      No contractions  3. CHELSEY: \"What date are you expecting to deliver?\"       8/9/23  4. PARITY: \"Have you had a baby before?\" If Yes, ask: \"How long did the labor last?\"      1 previous pregnancy and live birth  5. FETAL MOVEMENT: \"Has the baby's movement decreased or changed significantly from normal?\"      Yes but decreased  6. OTHER SYMPTOMS: \"Do you have any other symptoms?\" (e.g., abdominal pain, vaginal bleeding, fever, hand/facial swelling)      Fluid was clear    Protocols used: PREGNANCY - RUPTURE OF MEMBRANES SUSPECTED-ADULT-[place]      " Justin Saleh)

## 2023-05-17 ENCOUNTER — PATIENT OUTREACH (OUTPATIENT)
Dept: LABOR AND DELIVERY | Facility: HOSPITAL | Age: 28
End: 2023-05-17
Payer: MEDICAID

## 2023-05-17 NOTE — OUTREACH NOTE
[facility]  Unable to Reach       Questions/Answers      Flowsheet Row Responses   Pending Outreach Prenatal Check-in   Call Attempt First   Outcome MyChart message sent to patient            EPDS sent to client in her mychart.     Tigist Harrison RN  Maternity Nurse Navigator    5/17/2023, 09:10 CDT

## 2023-05-18 ENCOUNTER — PATIENT OUTREACH (OUTPATIENT)
Dept: LABOR AND DELIVERY | Facility: HOSPITAL | Age: 28
End: 2023-05-18
Payer: MEDICAID

## 2023-05-18 NOTE — OUTREACH NOTE
[facility] Connection  Unable to Reach       Questions/Answers      Flowsheet Row Responses   Pending Outreach Prenatal Check-in   Call Attempt First   Outcome No answer/busy, Left message                Tigist Harrison RN  Maternity Nurse Navigator    5/18/2023, 14:40 CDT

## 2023-05-29 ENCOUNTER — PATIENT OUTREACH (OUTPATIENT)
Dept: LABOR AND DELIVERY | Facility: HOSPITAL | Age: 28
End: 2023-05-29

## 2023-05-29 NOTE — OUTREACH NOTE
[facility] Connection  Unable to Reach       Questions/Answers      Flowsheet Row Responses   Pending Outreach Prenatal Check-in   Call Attempt Second   Outcome No answer/busy, Left message, [employer] message sent to patient                Tigist Harrison RN  Maternity Nurse Navigator    5/29/2023, 12:15 CDT

## 2023-07-14 LAB — EXTERNAL GROUP B STREP ANTIGEN: NEGATIVE

## 2023-07-24 ENCOUNTER — ANESTHESIA EVENT (OUTPATIENT)
Dept: LABOR AND DELIVERY | Facility: HOSPITAL | Age: 28
End: 2023-07-24

## 2023-07-24 ENCOUNTER — ANESTHESIA (OUTPATIENT)
Dept: LABOR AND DELIVERY | Facility: HOSPITAL | Age: 28
End: 2023-07-24

## 2023-07-24 ENCOUNTER — HOSPITAL ENCOUNTER (INPATIENT)
Facility: HOSPITAL | Age: 28
LOS: 3 days | Discharge: HOME | End: 2023-07-27
Attending: OBSTETRICS & GYNECOLOGY | Admitting: OBSTETRICS & GYNECOLOGY
Payer: MEDICAID

## 2023-07-24 PROBLEM — Z37.9 NORMAL LABOR: Status: ACTIVE | Noted: 2023-07-24

## 2023-07-24 LAB
ABO GROUP BLD: NORMAL
ALT SERPL W P-5'-P-CCNC: 138 U/L (ref 1–33)
AST SERPL-CCNC: 70 U/L (ref 1–32)
BACTERIA UR QL AUTO: ABNORMAL /HPF
BASOPHILS # BLD AUTO: 0.02 10*3/MM3 (ref 0–0.2)
BASOPHILS NFR BLD AUTO: 0.3 % (ref 0–1.5)
BILIRUB UR QL STRIP: NEGATIVE
BLD GP AB SCN SERPL QL: NEGATIVE
CLARITY UR: CLEAR
CLUE CELLS SPEC QL WET PREP: ABNORMAL
COD CRY URNS QL: ABNORMAL /HPF
COLLECT DURATION TIME UR: 24 HRS
COLOR UR: ABNORMAL
DEPRECATED RDW RBC AUTO: 43 FL (ref 37–54)
EOSINOPHIL # BLD AUTO: 0.01 10*3/MM3 (ref 0–0.4)
EOSINOPHIL NFR BLD AUTO: 0.1 % (ref 0.3–6.2)
ERYTHROCYTE [DISTWIDTH] IN BLOOD BY AUTOMATED COUNT: 12.9 % (ref 12.3–15.4)
EXPIRATION DATE: ABNORMAL
GLUCOSE UR STRIP-MCNC: NEGATIVE MG/DL
HCT VFR BLD AUTO: 42.7 % (ref 34–46.6)
HGB BLD-MCNC: 13.5 G/DL (ref 12–15.9)
HGB UR QL STRIP.AUTO: NEGATIVE
HYALINE CASTS UR QL AUTO: ABNORMAL /LPF
HYDATID CYST SPEC WET PREP: ABNORMAL
IMM GRANULOCYTES # BLD AUTO: 0.04 10*3/MM3 (ref 0–0.05)
IMM GRANULOCYTES NFR BLD AUTO: 0.5 % (ref 0–0.5)
KETONES UR QL STRIP: ABNORMAL
LDH SERPL-CCNC: 227 U/L (ref 135–214)
LEUKOCYTE ESTERASE UR QL STRIP.AUTO: ABNORMAL
LYMPHOCYTES # BLD AUTO: 2.48 10*3/MM3 (ref 0.7–3.1)
LYMPHOCYTES NFR BLD AUTO: 33.4 % (ref 19.6–45.3)
Lab: ABNORMAL
MCH RBC QN AUTO: 29.1 PG (ref 26.6–33)
MCHC RBC AUTO-ENTMCNC: 31.6 G/DL (ref 31.5–35.7)
MCV RBC AUTO: 92 FL (ref 79–97)
MONOCYTES # BLD AUTO: 0.51 10*3/MM3 (ref 0.1–0.9)
MONOCYTES NFR BLD AUTO: 6.9 % (ref 5–12)
NEUTROPHILS NFR BLD AUTO: 4.36 10*3/MM3 (ref 1.7–7)
NEUTROPHILS NFR BLD AUTO: 58.8 % (ref 42.7–76)
NITRITE UR QL STRIP: NEGATIVE
NRBC BLD AUTO-RTO: 0 /100 WBC (ref 0–0.2)
PH UR STRIP.AUTO: 6.5 [PH] (ref 5–8)
PLATELET # BLD AUTO: 228 10*3/MM3 (ref 140–450)
PMV BLD AUTO: 12.3 FL (ref 6–12)
PROT 24H UR-MRATE: 509.6 MG/24HOURS (ref 0–150)
PROT UR QL STRIP: ABNORMAL
PROT UR STRIP-MCNC: ABNORMAL MG/DL
RBC # BLD AUTO: 4.64 10*6/MM3 (ref 3.77–5.28)
RBC # UR STRIP: ABNORMAL /HPF
REF LAB TEST METHOD: ABNORMAL
RH BLD: POSITIVE
SP GR UR STRIP: 1.02 (ref 1–1.03)
SPECIMEN VOL 24H UR: 2600 ML
SQUAMOUS #/AREA URNS HPF: ABNORMAL /HPF
T VAGINALIS SPEC QL WET PREP: ABNORMAL
T&S EXPIRATION DATE: NORMAL
URATE SERPL-MCNC: 5.6 MG/DL (ref 2.4–5.7)
UROBILINOGEN UR QL STRIP: ABNORMAL
WBC # UR STRIP: ABNORMAL /HPF
WBC NRBC COR # BLD: 7.42 10*3/MM3 (ref 3.4–10.8)
WBC SPEC QL WET PREP: ABNORMAL
YEAST GENITAL QL WET PREP: ABNORMAL

## 2023-07-24 PROCEDURE — 81002 URINALYSIS NONAUTO W/O SCOPE: CPT | Performed by: OBSTETRICS & GYNECOLOGY

## 2023-07-24 PROCEDURE — 85025 COMPLETE CBC W/AUTO DIFF WBC: CPT | Performed by: OBSTETRICS & GYNECOLOGY

## 2023-07-24 PROCEDURE — 87086 URINE CULTURE/COLONY COUNT: CPT | Performed by: OBSTETRICS & GYNECOLOGY

## 2023-07-24 PROCEDURE — 86901 BLOOD TYPING SEROLOGIC RH(D): CPT | Performed by: OBSTETRICS & GYNECOLOGY

## 2023-07-24 PROCEDURE — 81001 URINALYSIS AUTO W/SCOPE: CPT | Performed by: OBSTETRICS & GYNECOLOGY

## 2023-07-24 PROCEDURE — 84460 ALANINE AMINO (ALT) (SGPT): CPT | Performed by: OBSTETRICS & GYNECOLOGY

## 2023-07-24 PROCEDURE — 25010000002 CEFTRIAXONE PER 250 MG: Performed by: OBSTETRICS & GYNECOLOGY

## 2023-07-24 PROCEDURE — 81050 URINALYSIS VOLUME MEASURE: CPT | Performed by: OBSTETRICS & GYNECOLOGY

## 2023-07-24 PROCEDURE — 83615 LACTATE (LD) (LDH) ENZYME: CPT | Performed by: OBSTETRICS & GYNECOLOGY

## 2023-07-24 PROCEDURE — 84156 ASSAY OF PROTEIN URINE: CPT | Performed by: OBSTETRICS & GYNECOLOGY

## 2023-07-24 PROCEDURE — 86850 RBC ANTIBODY SCREEN: CPT | Performed by: OBSTETRICS & GYNECOLOGY

## 2023-07-24 PROCEDURE — 84450 TRANSFERASE (AST) (SGOT): CPT | Performed by: OBSTETRICS & GYNECOLOGY

## 2023-07-24 PROCEDURE — 86900 BLOOD TYPING SEROLOGIC ABO: CPT | Performed by: OBSTETRICS & GYNECOLOGY

## 2023-07-24 PROCEDURE — 84550 ASSAY OF BLOOD/URIC ACID: CPT | Performed by: OBSTETRICS & GYNECOLOGY

## 2023-07-24 PROCEDURE — 87210 SMEAR WET MOUNT SALINE/INK: CPT | Performed by: OBSTETRICS & GYNECOLOGY

## 2023-07-24 RX ORDER — FENTANYL CITRATE 50 UG/ML
50 INJECTION, SOLUTION INTRAMUSCULAR; INTRAVENOUS
Status: DISCONTINUED | OUTPATIENT
Start: 2023-07-24 | End: 2023-07-26

## 2023-07-24 RX ORDER — SODIUM CHLORIDE, SODIUM LACTATE, POTASSIUM CHLORIDE, CALCIUM CHLORIDE 600; 310; 30; 20 MG/100ML; MG/100ML; MG/100ML; MG/100ML
125 INJECTION, SOLUTION INTRAVENOUS CONTINUOUS
Status: DISCONTINUED | OUTPATIENT
Start: 2023-07-24 | End: 2023-07-26

## 2023-07-24 RX ORDER — OXYTOCIN/0.9 % SODIUM CHLORIDE 30/500 ML
2-20 PLASTIC BAG, INJECTION (ML) INTRAVENOUS
Status: DISCONTINUED | OUTPATIENT
Start: 2023-07-24 | End: 2023-07-26

## 2023-07-24 RX ADMIN — Medication 2 MILLI-UNITS/MIN: at 19:44

## 2023-07-24 RX ADMIN — SODIUM CHLORIDE, POTASSIUM CHLORIDE, SODIUM LACTATE AND CALCIUM CHLORIDE 125 ML/HR: 600; 310; 30; 20 INJECTION, SOLUTION INTRAVENOUS at 21:04

## 2023-07-24 RX ADMIN — CEFTRIAXONE 2000 MG: 2 INJECTION, POWDER, FOR SOLUTION INTRAMUSCULAR; INTRAVENOUS at 19:45

## 2023-07-24 RX ADMIN — SODIUM CHLORIDE, POTASSIUM CHLORIDE, SODIUM LACTATE AND CALCIUM CHLORIDE 125 ML/HR: 600; 310; 30; 20 INJECTION, SOLUTION INTRAVENOUS at 18:22

## 2023-07-25 ENCOUNTER — ANESTHESIA (OUTPATIENT)
Dept: LABOR AND DELIVERY | Facility: HOSPITAL | Age: 28
End: 2023-07-25
Payer: MEDICAID

## 2023-07-25 ENCOUNTER — ANESTHESIA EVENT (OUTPATIENT)
Dept: LABOR AND DELIVERY | Facility: HOSPITAL | Age: 28
End: 2023-07-25
Payer: MEDICAID

## 2023-07-25 PROBLEM — Z34.90 PREGNANCY: Status: RESOLVED | Noted: 2023-04-21 | Resolved: 2023-07-25

## 2023-07-25 PROBLEM — Z37.9 NORMAL LABOR: Status: RESOLVED | Noted: 2023-07-24 | Resolved: 2023-07-25

## 2023-07-25 PROCEDURE — 3E033VJ INTRODUCTION OF OTHER HORMONE INTO PERIPHERAL VEIN, PERCUTANEOUS APPROACH: ICD-10-PCS | Performed by: OBSTETRICS & GYNECOLOGY

## 2023-07-25 PROCEDURE — 88307 TISSUE EXAM BY PATHOLOGIST: CPT | Performed by: OBSTETRICS & GYNECOLOGY

## 2023-07-25 PROCEDURE — 25010000002 CEFTRIAXONE PER 250 MG: Performed by: OBSTETRICS & GYNECOLOGY

## 2023-07-25 PROCEDURE — 51702 INSERT TEMP BLADDER CATH: CPT

## 2023-07-25 PROCEDURE — 0 MAGNESIUM SULFATE 20 GM/500ML SOLUTION: Performed by: OBSTETRICS & GYNECOLOGY

## 2023-07-25 PROCEDURE — C1755 CATHETER, INTRASPINAL: HCPCS | Performed by: NURSE ANESTHETIST, CERTIFIED REGISTERED

## 2023-07-25 PROCEDURE — 25010000002 ROPIVACAINE PER 1 MG: Performed by: NURSE ANESTHETIST, CERTIFIED REGISTERED

## 2023-07-25 RX ORDER — EPHEDRINE SULFATE 50 MG/ML
10 INJECTION, SOLUTION INTRAVENOUS
Status: DISCONTINUED | OUTPATIENT
Start: 2023-07-25 | End: 2023-07-26 | Stop reason: HOSPADM

## 2023-07-25 RX ORDER — TRISODIUM CITRATE DIHYDRATE AND CITRIC ACID MONOHYDRATE 500; 334 MG/5ML; MG/5ML
30 SOLUTION ORAL ONCE
Status: DISCONTINUED | OUTPATIENT
Start: 2023-07-25 | End: 2023-07-26 | Stop reason: HOSPADM

## 2023-07-25 RX ORDER — SODIUM CHLORIDE 0.9 % (FLUSH) 0.9 %
10 SYRINGE (ML) INJECTION AS NEEDED
Status: DISCONTINUED | OUTPATIENT
Start: 2023-07-25 | End: 2023-07-26

## 2023-07-25 RX ORDER — CARBOPROST TROMETHAMINE 250 UG/ML
250 INJECTION, SOLUTION INTRAMUSCULAR
Status: DISCONTINUED | OUTPATIENT
Start: 2023-07-25 | End: 2023-07-26 | Stop reason: HOSPADM

## 2023-07-25 RX ORDER — ONDANSETRON 2 MG/ML
4 INJECTION INTRAMUSCULAR; INTRAVENOUS EVERY 6 HOURS PRN
Status: DISCONTINUED | OUTPATIENT
Start: 2023-07-25 | End: 2023-07-27 | Stop reason: HOSPADM

## 2023-07-25 RX ORDER — PROMETHAZINE HYDROCHLORIDE 12.5 MG/1
12.5 SUPPOSITORY RECTAL EVERY 6 HOURS PRN
Status: DISCONTINUED | OUTPATIENT
Start: 2023-07-25 | End: 2023-07-27 | Stop reason: HOSPADM

## 2023-07-25 RX ORDER — PRENATAL VIT/IRON FUM/FOLIC AC 27MG-0.8MG
1 TABLET ORAL DAILY
Status: DISCONTINUED | OUTPATIENT
Start: 2023-07-26 | End: 2023-07-27 | Stop reason: HOSPADM

## 2023-07-25 RX ORDER — SODIUM CHLORIDE 0.9 % (FLUSH) 0.9 %
10 SYRINGE (ML) INJECTION EVERY 12 HOURS SCHEDULED
Status: DISCONTINUED | OUTPATIENT
Start: 2023-07-25 | End: 2023-07-26

## 2023-07-25 RX ORDER — MAGNESIUM SULFATE HEPTAHYDRATE 40 MG/ML
2 INJECTION, SOLUTION INTRAVENOUS CONTINUOUS
Status: DISCONTINUED | OUTPATIENT
Start: 2023-07-25 | End: 2023-07-26

## 2023-07-25 RX ORDER — OXYTOCIN/0.9 % SODIUM CHLORIDE 30/500 ML
125 PLASTIC BAG, INJECTION (ML) INTRAVENOUS CONTINUOUS PRN
Status: DISCONTINUED | OUTPATIENT
Start: 2023-07-25 | End: 2023-07-27 | Stop reason: HOSPADM

## 2023-07-25 RX ORDER — IBUPROFEN 600 MG/1
600 TABLET ORAL EVERY 6 HOURS PRN
Status: DISCONTINUED | OUTPATIENT
Start: 2023-07-25 | End: 2023-07-27 | Stop reason: HOSPADM

## 2023-07-25 RX ORDER — OXYTOCIN/0.9 % SODIUM CHLORIDE 30/500 ML
999 PLASTIC BAG, INJECTION (ML) INTRAVENOUS ONCE
Status: DISCONTINUED | OUTPATIENT
Start: 2023-07-25 | End: 2023-07-26 | Stop reason: HOSPADM

## 2023-07-25 RX ORDER — PROMETHAZINE HYDROCHLORIDE 25 MG/1
25 TABLET ORAL EVERY 6 HOURS PRN
Status: DISCONTINUED | OUTPATIENT
Start: 2023-07-25 | End: 2023-07-27 | Stop reason: HOSPADM

## 2023-07-25 RX ORDER — ACETAMINOPHEN 325 MG/1
650 TABLET ORAL EVERY 6 HOURS PRN
Status: DISCONTINUED | OUTPATIENT
Start: 2023-07-25 | End: 2023-07-27 | Stop reason: HOSPADM

## 2023-07-25 RX ORDER — BISACODYL 10 MG
10 SUPPOSITORY, RECTAL RECTAL DAILY PRN
Status: DISCONTINUED | OUTPATIENT
Start: 2023-07-26 | End: 2023-07-27 | Stop reason: HOSPADM

## 2023-07-25 RX ORDER — METHYLERGONOVINE MALEATE 0.2 MG/ML
200 INJECTION INTRAVENOUS ONCE AS NEEDED
Status: DISCONTINUED | OUTPATIENT
Start: 2023-07-25 | End: 2023-07-27 | Stop reason: HOSPADM

## 2023-07-25 RX ORDER — IBUPROFEN 800 MG/1
800 TABLET ORAL EVERY 8 HOURS PRN
Status: DISCONTINUED | OUTPATIENT
Start: 2023-07-25 | End: 2023-07-26 | Stop reason: HOSPADM

## 2023-07-25 RX ORDER — SODIUM CHLORIDE 0.9 % (FLUSH) 0.9 %
1-10 SYRINGE (ML) INJECTION AS NEEDED
Status: DISCONTINUED | OUTPATIENT
Start: 2023-07-25 | End: 2023-07-27 | Stop reason: HOSPADM

## 2023-07-25 RX ORDER — ONDANSETRON 4 MG/1
4 TABLET, FILM COATED ORAL EVERY 6 HOURS PRN
Status: DISCONTINUED | OUTPATIENT
Start: 2023-07-25 | End: 2023-07-27 | Stop reason: HOSPADM

## 2023-07-25 RX ORDER — ONDANSETRON 4 MG/1
4 TABLET, FILM COATED ORAL EVERY 6 HOURS PRN
Status: DISCONTINUED | OUTPATIENT
Start: 2023-07-25 | End: 2023-07-26 | Stop reason: HOSPADM

## 2023-07-25 RX ORDER — OXYTOCIN/0.9 % SODIUM CHLORIDE 30/500 ML
250 PLASTIC BAG, INJECTION (ML) INTRAVENOUS CONTINUOUS
Status: ACTIVE | OUTPATIENT
Start: 2023-07-25 | End: 2023-07-25

## 2023-07-25 RX ORDER — PROMETHAZINE HYDROCHLORIDE 25 MG/1
12.5 TABLET ORAL EVERY 6 HOURS PRN
Status: DISCONTINUED | OUTPATIENT
Start: 2023-07-25 | End: 2023-07-26 | Stop reason: HOSPADM

## 2023-07-25 RX ORDER — DOCUSATE SODIUM 100 MG/1
100 CAPSULE, LIQUID FILLED ORAL 2 TIMES DAILY
Status: DISCONTINUED | OUTPATIENT
Start: 2023-07-25 | End: 2023-07-27 | Stop reason: HOSPADM

## 2023-07-25 RX ORDER — LIDOCAINE HYDROCHLORIDE 20 MG/ML
INJECTION, SOLUTION EPIDURAL; INFILTRATION; INTRACAUDAL; PERINEURAL AS NEEDED
Status: DISCONTINUED | OUTPATIENT
Start: 2023-07-25 | End: 2023-07-25 | Stop reason: SURG

## 2023-07-25 RX ORDER — TRAMADOL HYDROCHLORIDE 50 MG/1
50 TABLET ORAL EVERY 6 HOURS PRN
Status: DISCONTINUED | OUTPATIENT
Start: 2023-07-25 | End: 2023-07-27 | Stop reason: HOSPADM

## 2023-07-25 RX ORDER — ONDANSETRON 2 MG/ML
4 INJECTION INTRAMUSCULAR; INTRAVENOUS EVERY 6 HOURS PRN
Status: DISCONTINUED | OUTPATIENT
Start: 2023-07-25 | End: 2023-07-26 | Stop reason: HOSPADM

## 2023-07-25 RX ORDER — LIDOCAINE HYDROCHLORIDE AND EPINEPHRINE 15; 5 MG/ML; UG/ML
INJECTION, SOLUTION EPIDURAL AS NEEDED
Status: DISCONTINUED | OUTPATIENT
Start: 2023-07-25 | End: 2023-07-25 | Stop reason: SURG

## 2023-07-25 RX ORDER — METHYLERGONOVINE MALEATE 0.2 MG/ML
200 INJECTION INTRAVENOUS ONCE AS NEEDED
Status: DISCONTINUED | OUTPATIENT
Start: 2023-07-25 | End: 2023-07-26 | Stop reason: HOSPADM

## 2023-07-25 RX ORDER — SODIUM CHLORIDE, SODIUM LACTATE, POTASSIUM CHLORIDE, CALCIUM CHLORIDE 600; 310; 30; 20 MG/100ML; MG/100ML; MG/100ML; MG/100ML
125 INJECTION, SOLUTION INTRAVENOUS CONTINUOUS
Status: DISCONTINUED | OUTPATIENT
Start: 2023-07-25 | End: 2023-07-27 | Stop reason: HOSPADM

## 2023-07-25 RX ORDER — FAMOTIDINE 10 MG/ML
20 INJECTION, SOLUTION INTRAVENOUS ONCE AS NEEDED
OUTPATIENT
Start: 2023-07-25

## 2023-07-25 RX ORDER — MISOPROSTOL 200 UG/1
800 TABLET ORAL ONCE AS NEEDED
Status: DISCONTINUED | OUTPATIENT
Start: 2023-07-25 | End: 2023-07-26 | Stop reason: HOSPADM

## 2023-07-25 RX ORDER — HYDROCORTISONE 25 MG/G
CREAM TOPICAL AS NEEDED
Status: DISCONTINUED | OUTPATIENT
Start: 2023-07-25 | End: 2023-07-27 | Stop reason: HOSPADM

## 2023-07-25 RX ORDER — MISOPROSTOL 200 UG/1
600 TABLET ORAL ONCE AS NEEDED
Status: DISCONTINUED | OUTPATIENT
Start: 2023-07-25 | End: 2023-07-27 | Stop reason: HOSPADM

## 2023-07-25 RX ORDER — PROMETHAZINE HYDROCHLORIDE 12.5 MG/1
12.5 SUPPOSITORY RECTAL EVERY 6 HOURS PRN
Status: DISCONTINUED | OUTPATIENT
Start: 2023-07-25 | End: 2023-07-26 | Stop reason: HOSPADM

## 2023-07-25 RX ORDER — CARBOPROST TROMETHAMINE 250 UG/ML
250 INJECTION, SOLUTION INTRAMUSCULAR ONCE AS NEEDED
Status: DISCONTINUED | OUTPATIENT
Start: 2023-07-25 | End: 2023-07-27 | Stop reason: HOSPADM

## 2023-07-25 RX ADMIN — Medication 250 ML/HR: at 10:45

## 2023-07-25 RX ADMIN — LIDOCAINE HYDROCHLORIDE AND EPINEPHRINE 3 ML: 15; 5 INJECTION, SOLUTION EPIDURAL at 08:54

## 2023-07-25 RX ADMIN — CEFTRIAXONE SODIUM 2000 MG: 2 INJECTION, POWDER, FOR SOLUTION INTRAMUSCULAR; INTRAVENOUS at 19:44

## 2023-07-25 RX ADMIN — SODIUM CHLORIDE, POTASSIUM CHLORIDE, SODIUM LACTATE AND CALCIUM CHLORIDE 125 ML/HR: 600; 310; 30; 20 INJECTION, SOLUTION INTRAVENOUS at 06:08

## 2023-07-25 RX ADMIN — LIDOCAINE HYDROCHLORIDE 5 ML: 20 INJECTION, SOLUTION EPIDURAL; INFILTRATION; INTRACAUDAL at 08:57

## 2023-07-25 RX ADMIN — MAGNESIUM SULFATE HEPTAHYDRATE 2 G/HR: 40 INJECTION, SOLUTION INTRAVENOUS at 19:42

## 2023-07-25 RX ADMIN — ACETAMINOPHEN 650 MG: 325 TABLET, FILM COATED ORAL at 23:12

## 2023-07-25 RX ADMIN — SODIUM CHLORIDE, POTASSIUM CHLORIDE, SODIUM LACTATE AND CALCIUM CHLORIDE 125 ML/HR: 600; 310; 30; 20 INJECTION, SOLUTION INTRAVENOUS at 09:04

## 2023-07-25 RX ADMIN — ROPIVACAINE HYDROCHLORIDE 4 ML/HR: 2 INJECTION, SOLUTION EPIDURAL; INFILTRATION at 09:01

## 2023-07-25 RX ADMIN — SODIUM CHLORIDE, POTASSIUM CHLORIDE, SODIUM LACTATE AND CALCIUM CHLORIDE 75 ML/HR: 600; 310; 30; 20 INJECTION, SOLUTION INTRAVENOUS at 11:50

## 2023-07-25 NOTE — LACTATION NOTE
To room to offer assistance with feeding. Reported per NICU RN, infant lacking interest to feed from bottle thus far, last adequate feeding at 1100 after delivery. Mother holding infant, infant is dressed and asleep. Mother reports she attempted providing bottle to infant at 1700 infant may have taken a little but not much. Mother accepting of assistance, prefers to bottle feed at this time and pump. Infant moved to crib, undressed, large stool diaper changed. Infant remains very sleepy despite removal of clothes and diaper. With mother's permission, hand expression performed, provided multiple drops of colostrum to infant via clean, gloved finger. Infant began smacking lips and sucking at fist. Mother then agreeable to attempt breastfeeding. Assisted to move infant to right breast in football hold. Infant latched without difficulty and nursed well for 15 mins. 5 mins achieved on left breast. Mother very sleepy durnig feeding due to magnesium drip infusion. Recommend infant's next feeding attempt in 2-3 hours, encouraged patient to have infant nurse to help with feeding, recommend undressing infant, hand expressing drops before feeding attempts. If unsuccessful to breastfeed, mother should pump. Hospital grade pump set up to bedside with 27 mm flanges. Reported plan and feeding to NICU RNDeborah.

## 2023-07-25 NOTE — CASE MANAGEMENT/SOCIAL WORK
"SW consulted due to concern that mother's other child will harm infant. SW met with mother. Maternal grandmother and 3yo child were present. Mother expressed concerns that 3yo may accidentally harm infant trying to \"help.\" Mother states that she is worried 3yo will treat infant like a baby doll. SW discussed with mother how to include 3yo at an appropriate level by giving her age appropriate tasks like handing mom wipes and diapers etc. SW recommended the HANDS program. Mother would like to be involved with the HANDS program. SW has faxed a referral. Mom states that she has a good support system and has all needed items to care for baby at home. She denies any other needs at this time. SW will follow and assist as needed.   "

## 2023-07-25 NOTE — PLAN OF CARE
Goal Outcome Evaluation:       37+2 delivered female infant. Fundus firm at the umbilicus light to scant bleeding. Second bag of pitocin infusing. Patient coping well and beginning to breastfeed.

## 2023-07-25 NOTE — PROGRESS NOTES
Eran Garcia  : 1995  MRN: [id number]  CSN: [id number]    Labor progress note    Subjective   Patient sent from office yesterday for elevated blood pressures, elevated 24 hour urine protein and PIH labs at 37 weeks - induction begun     Objective   Min/max vitals past 24 hours:  Temp  Min: 98 °F (36.7 °C)  Max: 98.7 °F (37.1 °C)   BP  Min: 114/90  Max: 160/94   Pulse  Min: 75  Max: 99   Resp  Min: 16  Max: 16        FHT's: reassuring and category 1.  external monitors used   Cervix: was checked (by RN): 2 cm / 50 % / -3   Contractions: irregular      Assessment   IUP at 37w2d  Severe pre-eclampsia     Plan   Continue with induction    Xiomara Rees MD  2023  07:18 CDT

## 2023-07-25 NOTE — PLAN OF CARE
Problem: Adult Inpatient Plan of Care  Goal: Plan of Care Review  Outcome: Ongoing, Not Progressing  Flowsheets (Taken 2023 0638)  Progress: improving  Plan of Care Reviewed With: patient  Outcome Evaluation: , 37w2d, IOL for HTN, Pitocin infusing at 20, LR infusing at 125 mL/hr, GBS-, Rocephin q24h, having a baby girl, EMTALA, and will breastfeed, requesting lacation after delivery  Goal: Patient-Specific Goal (Individualized)  Outcome: Ongoing, Not Progressing  Goal: Absence of Hospital-Acquired Illness or Injury  Outcome: Ongoing, Not Progressing  Intervention: Identify and Manage Fall Risk  Recent Flowsheet Documentation  Taken 2023 0400 by Heather Post RN  Safety Promotion/Fall Prevention: safety round/check completed  Taken 2023 0300 by Heather Post RN  Safety Promotion/Fall Prevention: safety round/check completed  Taken 2023 0200 by Heather Post RN  Safety Promotion/Fall Prevention: safety round/check completed  Taken 2023 0100 by Heather Post RN  Safety Promotion/Fall Prevention: safety round/check completed  Taken 2023 0000 by Heather Post RN  Safety Promotion/Fall Prevention: safety round/check completed  Taken 2023 2300 by Heather Post RN  Safety Promotion/Fall Prevention: safety round/check completed  Taken 2023 2200 by Heather Post RN  Safety Promotion/Fall Prevention: safety round/check completed  Taken 2023 2100 by Heather Post RN  Safety Promotion/Fall Prevention: safety round/check completed  Taken 2023 2000 by Heather Post RN  Safety Promotion/Fall Prevention: safety round/check completed  Taken 2023 1920 by Heather Post RN  Safety Promotion/Fall Prevention: safety round/check completed  Intervention: Prevent Skin Injury  Recent Flowsheet Documentation  Taken 2023 192 by eHather Post RN  Body Position: supine  Goal: Optimal Comfort and Wellbeing  Outcome: Ongoing, Not Progressing  Intervention: Provide  Person-Centered Care  Recent Flowsheet Documentation  Taken 2023 by Heather Post RN  Trust Relationship/Rapport:   care explained   choices provided   empathic listening provided   emotional support provided   questions answered   questions encouraged   thoughts/feelings acknowledged   reassurance provided  Goal: Readiness for Transition of Care  Outcome: Ongoing, Not Progressing  Intervention: Mutually Develop Transition Plan  Recent Flowsheet Documentation  Taken 2023 by Heather Post RN  Transportation Anticipated: car, drives self  Patient/Family Anticipated Services at Transition: none  Patient/Family Anticipates Transition to: home     Problem: Bleeding (Labor)  Goal: Hemostasis  Outcome: Ongoing, Not Progressing     Problem: Change in Fetal Wellbeing (Labor)  Goal: Stable Fetal Wellbeing  Outcome: Ongoing, Not Progressing  Intervention: Promote and Monitor Fetal Wellbeing  Recent Flowsheet Documentation  Taken 2023 by Heather Post RN  Body Position: supine     Problem: Delayed Labor Progression (Labor)  Goal: Effective Progression to Delivery  Outcome: Ongoing, Not Progressing     Problem: Infection (Labor)  Goal: Absence of Infection Signs and Symptoms  Outcome: Ongoing, Not Progressing     Problem: Labor Pain (Labor)  Goal: Acceptable Pain Control  Outcome: Ongoing, Not Progressing     Problem: Uterine Tachysystole (Labor)  Goal: Normal Uterine Contraction Pattern  Outcome: Ongoing, Not Progressing   Goal Outcome Evaluation:  Plan of Care Reviewed With: patient        Progress: improving  Outcome Evaluation: , 37w2d, IOL for HTN, Pitocin infusing at 20, LR infusing at 125 mL/hr, GBS-, Rocephin q24h, having a baby girl, EMTALA, and will breastfeed, requesting lacation after delivery

## 2023-07-25 NOTE — ANESTHESIA PROCEDURE NOTES
Labor Epidural      Patient reassessed immediately prior to procedure    Start Time: 7/25/2023 8:47 AM  Stop Time: 7/25/2023 8:52 AM  Indication:at surgeon's request  Performed By  CRNA/CAA: Vamsi No CRNA  Preanesthetic Checklist  Completed: patient identified, IV checked, site marked, risks and benefits discussed, surgical consent, monitors and equipment checked, pre-op evaluation and timeout performed  Prep:  Pt Position:sitting  Sterile Tech:gloves, cap and sterile barrier  Prep:chlorhexidine gluconate and isopropyl alcohol  Monitoring:continuous pulse oximetry, EKG and blood pressure monitoring  Epidural Block Procedure:  Approach:midline  Guidance:landmark technique and palpation technique  Location:L4-L5  Needle Type:Tuohy  Needle Gauge:17 G  Loss of Resistance Medium: saline  Loss of Resistance: 9cm  Cath Depth at skin:15 cm  Paresthesia: none  Aspiration:negative  Test Dose:negative  Number of Attempts: 1  Post Assessment:  Dressing:secured with tape and occlusive dressing applied  Pt Tolerance:patient tolerated the procedure well with no apparent complications  Complications:no

## 2023-07-25 NOTE — ANESTHESIA PREPROCEDURE EVALUATION
Anesthesia Evaluation     no history of anesthetic complications:   NPO Solid Status: N/A  NPO Liquid Status: N/A           Airway   Mallampati: III  TM distance: >3 FB  Neck ROM: full  Dental - normal exam     Pulmonary - negative pulmonary ROS   (-) recent URI  Cardiovascular   Exercise tolerance: good (4-7 METS)    (+) hypertension well controlled      Neuro/Psych- negative ROS  GI/Hepatic/Renal/Endo    (+) obesity, morbid obesity, GERD, thyroid problem hypothyroidism    Musculoskeletal     Abdominal    Substance History      OB/GYN    (+) Pregnant, pregnancy induced hypertension        Other                      Anesthesia Plan    ASA 2     epidural     intravenous induction     Anesthetic plan, risks, benefits, and alternatives have been provided, discussed and informed consent has been obtained with: patient.    CODE STATUS:

## 2023-07-25 NOTE — L&D DELIVERY NOTE
[facility]  Vaginal Delivery Note    Delivery details     Delivery: Vaginal, Spontaneous     YOB: 2025    Time of Birth: 10:29 AM      Anesthesia: Epidural     Delivering clinician: Xiomara Rees    Forceps?   No   Vacuum? No    Shoulder dystocia present: No      Delivery narrative:      Infant details    Findings: female  infant     Infant observations: Weight: 3190 g (7 lb 0.5 oz)   Length: 20.5  in   Apgars: 8  @ 1 minute /    9  @ 5 minutes     Placenta, Cord, and Fluid    Placenta delivered  Spontaneous  at   2023 10:32 AM     Cord: 3 vessels  present.   Nuchal Cord?  no   Cord blood obtained: Yes      Repair    Episiotomy: None    Lacerations: No   Estimated Blood Loss:     Suture used for repair: N/a       Complications  none    Disposition  Mother to Mother Baby/Postpartum  in stable condition currently.  Baby to remains with mom  in stable condition currently.      Xiomara Rees MD  23  07:20 CDT

## 2023-07-25 NOTE — LACTATION NOTE
Mother's Name: Dada Phone #:[phone]  Infant Name:   :2025  Gestation:37w2d  Day of life:0  Birth weight:  7-0.5 (3190g) Discharge weight:  Weight Loss:   24 hour Summary of Feeds:  Voids:  Stools:  Assistive devices (shields, shells, etc):NA  Significant Maternal history:, Galactorrhea unrelated to pregnancy, Hyperprolactinemia, Hyperthyroidism, attempted breastfeeding on day of delivery with first child then switched to formula  Maternal Concerns:  denies  Maternal Goal: breastfeed and formula feed  Mother's Medications: Protonix  Breastpump for home: [facility] has RX- notified of delivery  Ped follow up appt:      Assisted to unswaddle infant and place skin to skin. Infant licking and smacking lips. Moved to cradle hold, infant latched with minimal effort. Nursed well for 20 mins, moved to right breast in football hold and achieved additional 16 mins. Mother expresses drops easily. Mother does plan to breast and formula feed. Provided education on supplementation, recommend pumping when formula provided. Informed mother hospital grade pump available to use during hospital stay as needed. Initial breastfeeding packet with youtube video list given and review. Questions denied at this time. Mother to remain in ldr on magnesium for 24 hours for severe pre-eclampsia. Encouragement and support provided.       Instructed mom our lactation team is here for continued support throughout their breastfeeding journey. Our team has encouraged mom to call with any questions or concerns that may arise after discharge.     Breastfeeding and Diaper Chart  Check List for Essentials of Positioning And Latch-on handout provided by Lactation Education Resources  Hand Expression handout provided by Lactation Education Resources  Five Keys to Successful Breastfeeding handout provided by Lactation Education Resources    The Many Benefits if Breastfeeding handout given  Breastfeeding saves time  *Breastfeeding allows  you to calm or feed your baby immediately, which leads to a happier baby who cries less  *There is nothing to buy, prepare, or maintain.There is nothing to clean or sterilize.  Breastfeeding builds a mothers confidence  *She knows all her baby needs to thrive is her!  Breastfeeding saves Money  *There is no formula to buy and healthier breast fed babies have less medical costs  Healthy Mom/Healthy baby  * babies get sick less often, and when they do they are usually sick less severely and for a shorter time  * babies have fewer ear infections  * babies have fewer allergies  *Mothers who breastfeed have a lower risk for cancer, osteoporosis, anemia, high blood pressure, obesity, and Type ll diabetes  *Mothers miss less work days with sick babies  Breast fed babies have a better dental health  * babies have better jaw development which requires lest orthodontic work  *Breast milk does not promote cavities  * babies can nurse at night without worry of tooth decay  Breastfeeding allows a baby to reach his full IQ potential  *The longer a baby is breast fed, the better their brain development  Breast fed babies and moms are more relaxed  *The hormones released during breastfeeding have a calming effect on mothers  *Breastfeeding requires mom to take a break; this may help mom get more rest after delivery  *Breastfeeding is quicker than preparing formula which allows mom and baby to get back to sleep faster  *Breastfeeding promotes bonding and allows mom to learn babies cues and care needs more quickly  Breastfeeding cleanup is easier  *The bowel movements and spit up of breast fed babies doesn't smell as bad  *Spit-up of breast fed babies doesn't stain clothing  Getting out of the hourse is easier  *No formula bottles to prepare and carry safely   *No time restraints due to worry about what baby will eat  *No worries about warming a bottle or finding safe water to prepare  bottles  Breastfeeding mother get their bodies back sooner  *The uterus shrinks more quickly and completely, which allows a flatter tummy  *Breastfeeding burns 400-500 calories a day; making milk torches stored fat!  Breastfeeding is better for the environment  *There is no trash to dispose of after breastfeeding  *There is no production facility to produce breast milk; moms body does it all without the pollution of a factory      Your Guide to Breastfeeding Booklet by [employer], www.[employer]      Safe Storage of Breastmilk magnet: childbirthgraphics.[employer]    Educational Breastfeeding Videos on   YouTube  (length of video in minutes)    Expressing the First Milk - Small Baby Series (7:19)  Hand Expression [facility] (7:34)  Attaching Your Baby at the Breast - Breastfeeding Series (10:26)  The Power of Pumping - [facility]   Maximizing Production [facility] (9:35)  Instructions for use Medela Symphony breastpump (English) (1:58)  Medela 2-Phase Expression (4:05)  Medela double pumping video (2:19)  Choosing your PersonalFit breast shield size (3:04)  We also recommend visiting www.[employer] for valuable education and videos on breastfeeding full term AND  infants. This is a great resource to begin learning about breastfeeding during pregnancy as well.                [facility]             [phone]

## 2023-07-26 LAB
BACTERIA SPEC AEROBE CULT: NORMAL
HCT VFR BLD AUTO: 41.9 % (ref 34–46.6)
HGB BLD-MCNC: 13.8 G/DL (ref 12–15.9)

## 2023-07-26 PROCEDURE — 85014 HEMATOCRIT: CPT | Performed by: OBSTETRICS & GYNECOLOGY

## 2023-07-26 PROCEDURE — 92650 AEP SCR AUDITORY POTENTIAL: CPT

## 2023-07-26 PROCEDURE — 25010000002 CEFTRIAXONE PER 250 MG: Performed by: OBSTETRICS & GYNECOLOGY

## 2023-07-26 PROCEDURE — 25010000002 FUROSEMIDE PER 20 MG: Performed by: OBSTETRICS & GYNECOLOGY

## 2023-07-26 PROCEDURE — 85018 HEMOGLOBIN: CPT | Performed by: OBSTETRICS & GYNECOLOGY

## 2023-07-26 PROCEDURE — 0 MAGNESIUM SULFATE 20 GM/500ML SOLUTION: Performed by: OBSTETRICS & GYNECOLOGY

## 2023-07-26 RX ORDER — NIFEDIPINE 10 MG/1
30 CAPSULE ORAL 2 TIMES DAILY
Status: DISCONTINUED | OUTPATIENT
Start: 2023-07-26 | End: 2023-07-26

## 2023-07-26 RX ORDER — FUROSEMIDE 10 MG/ML
20 INJECTION INTRAMUSCULAR; INTRAVENOUS ONCE
Status: COMPLETED | OUTPATIENT
Start: 2023-07-26 | End: 2023-07-26

## 2023-07-26 RX ORDER — NIFEDIPINE 10 MG/1
30 CAPSULE ORAL EVERY 8 HOURS SCHEDULED
Status: DISCONTINUED | OUTPATIENT
Start: 2023-07-26 | End: 2023-07-27 | Stop reason: HOSPADM

## 2023-07-26 RX ORDER — NIFEDIPINE 10 MG/1
30 CAPSULE ORAL EVERY 8 HOURS SCHEDULED
Status: DISCONTINUED | OUTPATIENT
Start: 2023-07-26 | End: 2023-07-26

## 2023-07-26 RX ADMIN — NIFEDIPINE 30 MG: 10 CAPSULE ORAL at 19:34

## 2023-07-26 RX ADMIN — MAGNESIUM SULFATE HEPTAHYDRATE 2 G/HR: 40 INJECTION, SOLUTION INTRAVENOUS at 07:02

## 2023-07-26 RX ADMIN — PRENATAL VIT W/ FE FUMARATE-FA TAB 27-0.8 MG 1 TABLET: 27-0.8 TAB at 07:19

## 2023-07-26 RX ADMIN — CEFTRIAXONE SODIUM 2000 MG: 2 INJECTION, POWDER, FOR SOLUTION INTRAMUSCULAR; INTRAVENOUS at 21:42

## 2023-07-26 RX ADMIN — FUROSEMIDE 20 MG: 10 INJECTION, SOLUTION INTRAMUSCULAR; INTRAVENOUS at 10:48

## 2023-07-26 RX ADMIN — IBUPROFEN 800 MG: 800 TABLET, FILM COATED ORAL at 07:19

## 2023-07-26 RX ADMIN — IBUPROFEN 800 MG: 800 TABLET, FILM COATED ORAL at 15:27

## 2023-07-26 RX ADMIN — NIFEDIPINE 30 MG: 10 CAPSULE ORAL at 10:45

## 2023-07-26 NOTE — ANESTHESIA POSTPROCEDURE EVALUATION
"Patient: Dada Garcia    Procedure Summary       Date: 07/25/23 Room / Location:     Anesthesia Start: 0843 Anesthesia Stop: 1029    Procedure: LABOR ANALGESIA Diagnosis:     Scheduled Providers:  Provider: Vamsi No CRNA    Anesthesia Type: epidural ASA Status: 2            Anesthesia Type: epidural    Vitals  Vitals Value Taken Time   /78 07/26/23 1415   Temp 97.8 °F (36.6 °C) 07/26/23 1058   Pulse 85 07/26/23 1415   Resp 16 07/26/23 1415   SpO2 98 % 07/26/23 1050           Post Anesthesia Care and Evaluation    Patient location during evaluation: bedside  Patient participation: complete - patient participated  Level of consciousness: awake and alert  Pain management: adequate    Airway patency: patent  Anesthetic complications: No anesthetic complications    Cardiovascular status: acceptable  Respiratory status: acceptable  Hydration status: acceptable  Post Neuraxial Block status: Motor and sensory function returned to baseline and No signs or symptoms of PDPH  Comments: Blood pressure 140/78, pulse 85, temperature 97.8 °F (36.6 °C), temperature source Oral, resp. rate 16, height 165.1 cm (65\"), weight 112 kg (246 lb 14.6 oz), SpO2 98 %, currently breastfeeding.        "

## 2023-07-26 NOTE — LACTATION NOTE
"Mother's Name: Dada Phone #: [phone]  Infant Name:   : 2023  Gestation:37w2d  Day of life: 1  Birth weight:  7-0.5 (3190g) Discharge weight:  Weight Loss: 0  24 hour Summary of Feeds: 4 BFs + Formula X 5 (35 ml total)  Voids:  Stools:  Assistive devices (shields, shells, etc): NA  Significant Maternal history: , Galactorrhea unrelated to pregnancy, Hyperprolactinemia, Hyperthyroidism, attempted breastfeeding on day of delivery with first child then switched to formula  Maternal Concerns: None  Maternal Goal: breastfeed and formula feed  Mother's Medications: Protonix  Breastpump for home: [facility] has RX- notified of delivery-Rx re-faxed 23  Ped follow up appt:    Patient reports infant latches well, however, she falls asleep quickly and just \"snacks\". Patient is feeding infant formula as well. She states infant is taking very little each feed. She last took 7 ml. Patient admits she isn't pumping. Discussed the need to keep infant awake for all feedings (breast and bottle), the need to pump to build supply, feeding amounts, and lactation support. Offered to assist with breastfeeding/pumping. Questions denied.    Instructed mom our lactation team is here for continued support throughout their breastfeeding journey. Our team has encouraged mom to call with any questions or concerns that may arise after discharge.       "

## 2023-07-26 NOTE — PAYOR COMM NOTE
"REF: [id number]    [facility]  PATRICK  [phone]  OR  FAX    [phone]    Shira Adler (27 y.o. Female)       Date of Birth   1995    Social Security Number       Address   [address] [place] [address]    Home Phone   [phone]    MRN   [id number]       Restoration   None    Marital Status   Single                            Admission Date   23    Admission Type   Elective    Admitting Provider   Xiomara Rees MD    Attending Provider   Xiomara Rees MD    Department, Room/Bed   [facility] LABOR DELIVERY, L03       Discharge Date       Discharge Disposition       Discharge Destination                                 Attending Provider: Xiomara Rees MD    Allergies: No Known Allergies    Isolation: None   Infection: None   Code Status: CPR    Ht: 165.1 cm (65\")   Wt: 112 kg (246 lb 14.6 oz)    Admission Cmt: None   Principal Problem: None                  Active Insurance as of 2023       Primary Coverage       Payor Plan Insurance Group Employer/Plan Group    ANTHEM MEDICAID [facility] MEDICAID [id number]       Payor Plan Address Payor Plan Phone Number Payor Plan Fax Number Effective Dates    [address] [address] [phone]  2016 - None Entered    [facility] [address]         Subscriber Name Subscriber Birth Date Member ID       SHIRA ADLER 1995 [id number]                     Emergency Contacts        (Rel.) Home Phone Work Phone Mobile Phone    Zarco,Michelle (Mother) [phone] -- [phone]    Ayo Ricks (Other) [phone] -- --          EDC 2023  G-2 P-1   37/2 GESTATIONAL AGE        Anayeli Adler [[id number]]    Labor Events     labor?: No   steroids?: None  Antibiotics during labor?: Yes  Rupture date/time: 2023 0745  Rupture type: artificial rupture of membranes  Fluid color: clear  Fluid odor: None  Labor " "type: Induced Onset of Labor  Labor allowed to proceed with plans for an attempted vaginal birth?: Yes  Induction: Oxytocin  Induction date/time: 2023 1944  Induction indications: Hypertension  Complications: None  Presentation    Presentation: Vertex  Position: Middle Occiput Anterior  [place] Delivery    Head delivery date/time: 2023 10:29:05  Delivery date/time: 2023 10:29 AM   Delivery type: Vaginal, Spontaneous   Details: Trial of labor?: Yes        Operative Delivery    Forceps attempted?: No  Vacuum extractor attempted?: No                  [place] Assessment    Living status: Living  Infant family band number: [id number]  Apgars   1 Minute: 5 Minute: 10 Minute 15 Minute 20 Minute   Skin Color: 0 1      Heart Rate: 2 2      Reflex Irritability: 2 2      Muscle Tone: 2 2      Respiratory Effort: 2 2      Total: 8 9              Apgars Assigned By: RYAN LOPEZ  Resuscitation    Method: Tactile Stimulation  Suction Details  Airway suction: none  Oxygen Details  Skin to Skin    Skin to skin initiated date/time: 2023 1102  Skin to skin with: Mother  Measurements    Weight: 7 lb 0.5 oz 3190 g Length: 20.5\"     Delivery Information    Episiotomy: None  Perineal lacerations: None    Surgical or additional est. blood loss (mL): 0  Combined est. blood loss (mL): 0       Physician Progress Notes (last 72 hours)        Xiomara Rees MD at 23 0718          [facility]  [facility]  : 1995  MRN: [id number]  CSN: [id number]    Labor progress note    Subjective   Patient sent from office yesterday for elevated blood pressures, elevated 24 hour urine protein and PIH labs at 37 weeks - induction begun    Objective   Min/max vitals past 24 hours:  Temp  Min: 98 °F (36.7 °C)  Max: 98.7 °F (37.1 °C)   BP  Min: 114/90  Max: 160/94   Pulse  Min: 75  Max: 99   Resp  Min: 16  Max: 16        FHT's: reassuring and category 1.  external monitors used   Cervix: was " checked (by RN): 2 cm / 50 % / -3   Contractions: irregular     Assessment   IUP at 37w2d  Severe pre-eclampsia    Plan   Continue with induction    Xiomara Rees MD  7/25/2023  07:18 CDT      Electronically signed by Xiomara Rees MD at 07/25/23 0719

## 2023-07-26 NOTE — PLAN OF CARE
Goal Outcome Evaluation:  Plan of Care Reviewed With: patient        Progress: improving  Outcome Evaluation: patient delivered vaginally at 1029 on7/25, mag started at 1200 (4g bolus, now running at 2g/hour), SCDs on, total fluids 125, simmons catheter in place, IV site changed due to patient pain at IV site, patient able to rest some throughout the night, no symptoms of mag toxicity, patient reporting no pain, uterus firm, midline, scant to no rubra, patient reporting occasional cramping

## 2023-07-26 NOTE — PLAN OF CARE
Goal Outcome Evaluation:  Plan of Care Reviewed With: patient        Progress: improving  Outcome Evaluation: ff ml u1 scant lochia, no edema, +2 reflexes, no clonus, /82 with last check, on procardia 30mg q8 hours, was on magnesium for 24 hours after delivery, intact vaginal delivery, bonding well with , pumping and giving formula

## 2023-07-27 ENCOUNTER — PATIENT OUTREACH (OUTPATIENT)
Dept: LABOR AND DELIVERY | Facility: HOSPITAL | Age: 28
End: 2023-07-27
Payer: MEDICAID

## 2023-07-27 VITALS
BODY MASS INDEX: 41.14 KG/M2 | HEART RATE: 83 BPM | SYSTOLIC BLOOD PRESSURE: 141 MMHG | TEMPERATURE: 97.6 F | RESPIRATION RATE: 18 BRPM | OXYGEN SATURATION: 98 % | DIASTOLIC BLOOD PRESSURE: 86 MMHG | WEIGHT: 246.91 LBS | HEIGHT: 65 IN

## 2023-07-27 LAB
CYTO UR: NORMAL
LAB AP CASE REPORT: NORMAL
Lab: NORMAL
PATH REPORT.FINAL DX SPEC: NORMAL
PATH REPORT.GROSS SPEC: NORMAL

## 2023-07-27 PROCEDURE — 90471 IMMUNIZATION ADMIN: CPT | Performed by: OBSTETRICS & GYNECOLOGY

## 2023-07-27 PROCEDURE — 25010000002 TETANUS-DIPHTH-ACELL PERTUSSIS 5-2.5-18.5 LF-MCG/0.5 SUSPENSION PREFILLED SYRINGE: Performed by: OBSTETRICS & GYNECOLOGY

## 2023-07-27 PROCEDURE — 90715 TDAP VACCINE 7 YRS/> IM: CPT | Performed by: OBSTETRICS & GYNECOLOGY

## 2023-07-27 RX ORDER — IBUPROFEN 600 MG/1
600 TABLET ORAL EVERY 6 HOURS PRN
Qty: 60 TABLET | Refills: 2 | Status: SHIPPED | OUTPATIENT
Start: 2023-07-27

## 2023-07-27 RX ORDER — TRAMADOL HYDROCHLORIDE 50 MG/1
50 TABLET ORAL EVERY 6 HOURS PRN
Qty: 10 TABLET | Refills: 0 | Status: SHIPPED | OUTPATIENT
Start: 2023-07-27 | End: 2023-08-01

## 2023-07-27 RX ORDER — NIFEDIPINE 10 MG/1
30 CAPSULE ORAL EVERY 8 HOURS SCHEDULED
Qty: 90 CAPSULE | Refills: 2 | Status: SHIPPED | OUTPATIENT
Start: 2023-07-27

## 2023-07-27 RX ADMIN — PRENATAL VIT W/ FE FUMARATE-FA TAB 27-0.8 MG 1 TABLET: 27-0.8 TAB at 08:09

## 2023-07-27 RX ADMIN — NIFEDIPINE 30 MG: 10 CAPSULE ORAL at 13:59

## 2023-07-27 RX ADMIN — TETANUS TOXOID, REDUCED DIPHTHERIA TOXOID AND ACELLULAR PERTUSSIS VACCINE, ADSORBED 0.5 ML: 5; 2.5; 8; 8; 2.5 SUSPENSION INTRAMUSCULAR at 13:04

## 2023-07-27 RX ADMIN — NIFEDIPINE 30 MG: 10 CAPSULE ORAL at 05:45

## 2023-07-27 NOTE — PROGRESS NOTES
"[facility]  Vaginal Delivery Progress Note    Subjective   Postpartum Day 2: Vaginal Delivery    The patient feels well.  Her pain is well controlled with prescribed pain medications.   She is ambulating well.  Patient describes her bleeding as thin lochia.    Breastfeeding: without difficulty.    Objective     Vital Signs Range for the last 24 hours  Temperature: Temp:  [97.8 °F (36.6 °C)-98.2 °F (36.8 °C)] 98 °F (36.7 °C)   Temp Source: Temp src: Temporal   BP: BP: (133-158)/(57-96) 133/58   Pulse: Heart Rate:  [] 70   Respirations: Resp:  [16-18] 18   SPO2: SpO2:  [95 %-100 %] 99 %   O2 Amount (l/min):     O2 Devices Device (Oxygen Therapy): room air   Weight:       Admit Height:  Height: 165.1 cm (65\")      Physical Exam:  General:  no acute distresss.  Abdomen: Fundus: appropriate, firm, non tender  Extremities: normal, atraumatic, no cyanosis, and trace edema.     Lab results reviewed:  Yes   Rubella:  No results found for: RUBELLAIGGIN Nurse Transcribed from prenatal record --  No components found for: EXTRUBELQUAL  Rh Status:    RH type   Date Value Ref Range Status   07/24/2023 Positive  Final     Immunizations:   Immunization History   Administered Date(s) Administered    DTaP, Unspecified 11/07/1997    MMR 11/07/1997    OPV 11/07/1997    Td (TDVAX) 12/08/2004    Varicella 08/11/1997       Assessment & Plan       * No active hospital problems. *      Dada Garcia is Day 2  post-partum    Vaginal, Spontaneous     .      Plan:  Discharge home with standard precautions and return to clinic in 4-6 weeks.      Xiomara Rees MD  7/27/2023  07:20 CDT  "

## 2023-07-27 NOTE — OUTREACH NOTE
[facility]  IP Postpartum    Questions/Answers      Flowsheet Row Responses   Best Method for Contacting Cell   Support Person Present No   Does the patient have a car seat at the hospital Yes   Delivery Note Reviewed Reviewed   Were birth expectations met? Yes   Is there a need for additional support/resources? No   Lactation Note Reviewed Reviewed   Is additional support needed? No   Any questions or concerns? No   Is the patient going to use Meds to Beds? Yes   Any concerns related discharge meds/ability to  prescriptions? No   OB Discharge Navigator Reviewed  Reviewed   Confirm Postpartum OB appointment Yes   Postpartum OB appointment date 23   Confirm initial well-child Pediatrician appointment date/time: Yes   Initial Well Child Pediatrician Appointment Date 23   Additional post-discharge F/U appointments Yes   Post-Discharge F/U appointments details lactation 2023 @ 1030   Does patient have transportation to appointments? Yes   Any other assistance needed to ensure she is able to attend appointments? No   Does patient have supplies needed at home for  care? Breast Pump, Clothing, Crib, Diapers, Formula          I called the HANDS program of [facility] and gave them the client's correct and working telephone number.  Client states she has support from her Grandma,  States she has all necessary items to take care of herself and baby.  Postpartum check-in telephone call scheduled for  and reminder card given.  Reminder also given for WIC and that the client needs to call and add baby to the feeding plan.  Client voices understanding.     Tigist Harrison RN  Maternity Nurse Navigator    2023, 11:10 CDT

## 2023-07-27 NOTE — DISCHARGE INSTRUCTIONS
PLEASE KEEP, READ AND REFER BACK TO YOUR POSTPARTUM AND  CARE BOOKLET WITH QUESTIONS OR CONCERNS. YOUR DOCTORS ARE ALWAYS AVAILABLE WITH QUESTIONS OR CONCERNS BY CALLING THEIR OFFICE NUMBERS.

## 2023-07-27 NOTE — PLAN OF CARE
Goal Outcome Evaluation:              Outcome Evaluation: VSS. FF/ML/U1. Scant lochia. Pumping and formula feeding. Encouraged pt to fill out paperwork. Amubulating. Voiding, stooling. No c/o pain this shift. Bonding well with baby.

## 2023-07-27 NOTE — DISCHARGE SUMMARY
Discharge Summary     Eran Garcia  : 1995  MRN: [id number]  CSN: [id number]    Date of Admission: 2023   Date of Discharge:  2023   Delivering Physician: Xiomara Rees        Admission Diagnosis: Pregnancy [Z34.90]  Normal labor [O80, Z37.9]  Severe pre-eclampsia, delivered, current hospitalization [O14.14]   Discharge Diagnosis: Pregnancy at 37w2d - delivered       Procedures: 2023  - Vaginal, Spontaneous       Hospital Course  Patient is a 27 y.o.  who at 37w2d had a uncomplicated vaginal delivery.  Her postpartum course was without complications.  On PPD #2 she was ready for discharge.  She had normal lochia and pain was well controlled with oral medications.    Infant  female  fetus weighing 3190 g (7 lb 0.5 oz)   Apgars -  8 @ 1 minute /  9 @ 5 minutes.    Discharge labs  Lab Results   Component Value Date    WBC 7.42 2023    HGB 13.8 2023    HCT 41.9 2023     2023       Discharge Medications     Discharge Medications        New Medications        Instructions Start Date   ibuprofen 600 MG tablet  Commonly known as: ADVIL,MOTRIN   600 mg, Oral, Every 6 Hours PRN      NIFEdipine 10 MG capsule  Commonly known as: PROCARDIA   30 mg, Oral, Every 8 Hours Scheduled      traMADol 50 MG tablet  Commonly known as: ULTRAM   50 mg, Oral, Every 6 Hours PRN             Continue These Medications        Instructions Start Date   pantoprazole 40 MG EC tablet  Commonly known as: Protonix   40 mg, Oral, Daily               Discharge Disposition Home or Self Care   Condition on Discharge: good   Follow-up: 4 weeks with Dr. Rees, if no appointment, please call office     Xiomara Rees MD  2023

## 2023-07-28 NOTE — PAYOR COMM NOTE
"REF:   [id number]     [facility]  FAX  [phone]      Shira Adler (27 y.o. Female)       Date of Birth   1995    Social Security Number       Address   [address] [place] [address] [place] [address]    Home Phone   [phone]    MRN   [id number]       Christianity   Other    Marital Status   Single                            Admission Date   23    Admission Type   Elective    Admitting Provider   Xiomara Rees MD    Attending Provider       Department, Room/Bed   [facility] MOTHER BABY 2A, M223/1       Discharge Date   2023    Discharge Disposition   Home or Self Care    Discharge Destination   Home                              Attending Provider: (none)   Allergies: No Known Allergies    Isolation: None   Infection: None   Code Status: Prior    Ht: 165.1 cm (65\")   Wt: 112 kg (246 lb 14.6 oz)    Admission Cmt: None   Principal Problem: None                  Active Insurance as of 2023       Primary Coverage       Payor Plan Insurance Group Employer/Plan Group    [facility] MEDICAID [facility] MEDICAID [id number]       Payor Plan Address Payor Plan Phone Number Payor Plan Fax Number Effective Dates    [address] [address] [phone]  2016 - None Entered    [facility] [address]         Subscriber Name Subscriber Birth Date Member ID       SHIRA ADLER 1995 [id number]                     Emergency Contacts        (Rel.) Home Phone Work Phone Mobile Phone    Michelle Zarco (Mother) [phone] -- [phone]    Ayo Ricks (Other) [phone] -- --                 Discharge Summary        Xiomara Rees MD at 23 0723          Discharge Summary    [facility]  Shira Adler  : 1995  MRN: [id number]  CSN: [id number]    Date of Admission: 2023   Date of Discharge:  2023   Delivering Physician: Xiomara Rees        Admission Diagnosis: Pregnancy " [Z34.90]  Normal labor [O80, Z37.9]  Severe pre-eclampsia, delivered, current hospitalization [O14.14]   Discharge Diagnosis: Pregnancy at 37w2d - delivered       Procedures: 2023  - Vaginal, Spontaneous       Hospital Course  Patient is a 27 y.o.  who at 37w2d had a uncomplicated vaginal delivery.  Her postpartum course was without complications.  On PPD #2 she was ready for discharge.  She had normal lochia and pain was well controlled with oral medications.    Infant  female  fetus weighing 3190 g (7 lb 0.5 oz)   Apgars -  8 @ 1 minute /  9 @ 5 minutes.    Discharge labs  Lab Results   Component Value Date    WBC 7.42 2023    HGB 13.8 2023    HCT 41.9 2023     2023       Discharge Medications     Discharge Medications        New Medications        Instructions Start Date   ibuprofen 600 MG tablet  Commonly known as: ADVIL,MOTRIN   600 mg, Oral, Every 6 Hours PRN      NIFEdipine 10 MG capsule  Commonly known as: PROCARDIA   30 mg, Oral, Every 8 Hours Scheduled      traMADol 50 MG tablet  Commonly known as: ULTRAM   50 mg, Oral, Every 6 Hours PRN             Continue These Medications        Instructions Start Date   pantoprazole 40 MG EC tablet  Commonly known as: Protonix   40 mg, Oral, Daily               Discharge Disposition Home or Self Care   Condition on Discharge: good   Follow-up: 4 weeks with Dr. Rees, if no appointment, please call office     Xiomara Rees MD  2023    Electronically signed by Xiomara Rees MD at 23 0795       Discharge Order (From admission, onward)       Start     Ordered    23 0722  Discharge patient  Once        Expected Discharge Date: 23   Discharge Disposition: Home or Self Care   Physician of Record for Attribution - Please select from Treatment Team: XIOMARA REES [[id number]]   Review needed by CMO to determine Physician of Record: No      Question Answer Comment   Physician of Record  for Attribution - Please select from Treatment Team AIAD RICO    Review needed by CMO to determine Physician of Record No        07/27/23 0756

## 2023-08-01 ENCOUNTER — PATIENT OUTREACH (OUTPATIENT)
Dept: LABOR AND DELIVERY | Facility: HOSPITAL | Age: 28
End: 2023-08-01
Payer: MEDICAID

## 2023-08-03 ENCOUNTER — PATIENT OUTREACH (OUTPATIENT)
Dept: LABOR AND DELIVERY | Facility: HOSPITAL | Age: 28
End: 2023-08-03
Payer: MEDICAID

## 2023-08-10 ENCOUNTER — PATIENT OUTREACH (OUTPATIENT)
Dept: CALL CENTER | Facility: HOSPITAL | Age: 28
End: 2023-08-10
Payer: MEDICAID

## 2023-08-10 NOTE — OUTREACH NOTE
Motherhood Connection Survey      Flowsheet Row Responses   [facility] [place] patient discharged from? [facility]   Week 1 attempt successful? Yes   Call start time 1647   Call end time 165   Baby sex Girl    discharged home with mother? Yes   Baby sex Girl   Delivery type Vaginal   Emotional state Acceptance   Family support Yes   Do you have all necessary resources to care for you and your baby?  Yes   Have members of your household adjusted to your baby? Yes   Did you have any problems with pre-eclampsia during this pregnancy? No   Did you have blood glucose issues during this pregnancy No   Lochia amount Light   Lochia per patient report Rubra   Feeding Method Combination   Frequency q 2-3hrs   Duration 15/15 min   Pumping Yes   Supplementing Breast Milk, Formula   Frequency few times/day   Amount 3 oz   Breast Condition No   Nipple Condition No   Nursing Interventions Lactation education provided   Number of wet diapers x 24 hours 8-10   Last BM x 24 hours 4-5   Umbilical Cord No reported signs or symptoms   Umbilical cord comments cord off   Where does the baby usually sleep? Bassinet   Are there stuffed animals, toys, pillows, quilts, blankets, wedges, positioners, bumpers or other loose bedding in the infant's sleeping environment? No   Does the baby ever share a sleep surface in a bed, couch, recliner or other? No   What position do you lay your baby down to sleep? Back   Are you and/or other caregivers smoking inside or outside the baby's home? No   Mom appointment comments: pp f/u scheduled   Baby appointment comments: Peds visits x2, gaining weight   Call completed? Yes   How satisfied were you with the Motherhood Connection Program? 5              Eli PEÑALOZA - Registered Nurse

## 2023-10-02 ENCOUNTER — TELEPHONE (OUTPATIENT)
Dept: BARIATRICS/WEIGHT MGMT | Facility: CLINIC | Age: 28
End: 2023-10-02
Payer: MEDICAID

## 2023-10-02 NOTE — TELEPHONE ENCOUNTER
LVM about their new pt appt, to bring ppw, sign up for B360. Arrive 60 minutes early if these aren't done. We will do a glucose finger stick at appt. Call w ith any questions [phone]. Also this 1st appt may last up 2hrs

## 2023-10-23 ENCOUNTER — TELEPHONE (OUTPATIENT)
Dept: BARIATRICS/WEIGHT MGMT | Facility: CLINIC | Age: 28
End: 2023-10-23
Payer: MEDICAID

## 2023-10-23 NOTE — TELEPHONE ENCOUNTER
LVM about their new pt appt, to bring ppw, sign up for B360. Arrive 60 minutes early if these aren't done. We will do a glucose finger stick at appt. Call w ith any questions [phone]. Also this 1st appt may last up 2hrs

## 2023-11-12 ENCOUNTER — HOSPITAL ENCOUNTER (EMERGENCY)
Facility: HOSPITAL | Age: 28
Discharge: HOME | End: 2023-11-12
Admitting: EMERGENCY MEDICINE
Payer: MEDICAID

## 2023-11-12 VITALS
OXYGEN SATURATION: 98 % | HEIGHT: 65 IN | DIASTOLIC BLOOD PRESSURE: 89 MMHG | HEART RATE: 86 BPM | WEIGHT: 232 LBS | SYSTOLIC BLOOD PRESSURE: 140 MMHG | TEMPERATURE: 97.5 F | BODY MASS INDEX: 38.65 KG/M2 | RESPIRATION RATE: 18 BRPM

## 2023-11-12 DIAGNOSIS — Z20.822 LAB TEST NEGATIVE FOR COVID-19 VIRUS: Primary | ICD-10-CM

## 2023-11-12 DIAGNOSIS — K02.9 DENTAL CARIES: ICD-10-CM

## 2023-11-12 LAB — SARS-COV-2 RNA RESP QL NAA+PROBE: NOT DETECTED

## 2023-11-12 PROCEDURE — 99282 EMERGENCY DEPT VISIT SF MDM: CPT

## 2023-11-12 PROCEDURE — 87635 SARS-COV-2 COVID-19 AMP PRB: CPT | Performed by: PHYSICIAN ASSISTANT

## 2023-11-12 RX ORDER — PENICILLIN V POTASSIUM 500 MG/1
500 TABLET ORAL 4 TIMES DAILY
Qty: 28 TABLET | Refills: 0 | Status: SHIPPED | OUTPATIENT
Start: 2023-11-12

## 2023-11-12 NOTE — ED PROVIDER NOTES
Subjective   History of Present Illness    Patient is a 28-year-old female chief complaint of just wanting COVID test.  She says that her daughter tested +9 days ago and she just wanted a COVID test to go back to work.  She denies any symptoms.  She denies any respiratory issues.  She denies any underlying pulmonary problems.    She is complaining of chronic dental pain and that she is scheduled to have oral surgery in a few months.  She is having increased pain since stopping his medication for that.  She denies any fever, drooling, stridor.  Denies trismus.    Review of Systems   All other systems reviewed and are negative.      Past Medical History:   Diagnosis Date    Amenorrhea     Benign neoplasm of pituitary gland     Galactorrhea not associated with childbirth     Galactorrhea due to non-obstetric cause       GERD (gastroesophageal reflux disease)     Gestational hypertension     IN THIRD TRIMESTER PER HX    Hyperprolactinemia     Hyperthyroidism     Prolactinoma        No Known Allergies    Past Surgical History:   Procedure Laterality Date    NO PAST SURGERIES         Family History   Problem Relation Age of Onset    Heart disease Maternal Grandmother     Cancer Paternal Grandmother     Diabetes Paternal Grandfather        Social History     Socioeconomic History    Marital status: Single   Tobacco Use    Smoking status: Never    Smokeless tobacco: Never   Vaping Use    Vaping Use: Never used   Substance and Sexual Activity    Alcohol use: No    Drug use: No    Sexual activity: Yes     Partners: Male       Prior to Admission medications    Medication Sig Start Date End Date Taking? Authorizing Provider   ibuprofen (ADVIL,MOTRIN) 600 MG tablet Take 1 tablet by mouth Every 6 (Six) Hours As Needed for Mild Pain (First Line: Mild pain.). 7/27/23   Xiomara Rees MD   NIFEdipine (PROCARDIA) 10 MG capsule Take 3 capsules by mouth Every 8 (Eight) Hours. 7/27/23   Xiomara Rees MD  "  pantoprazole (Protonix) 40 MG EC tablet Take 1 tablet by mouth Daily. 11/16/21   Samantha Hernandez APRN       Medications - No data to display    /89 (BP Location: Left arm, Patient Position: Sitting)   Pulse 86   Temp 97.5 °F (36.4 °C) (Oral)   Resp 18   Ht 165.1 cm (65\")   Wt 105 kg (232 lb)   LMP 11/12/2023 (Exact Date)   SpO2 98%   Breastfeeding No   BMI 38.61 kg/m²       Objective   Physical Exam  Vitals reviewed.   Constitutional:       Appearance: Normal appearance. She is obese.   HENT:      Head:      Jaw: There is normal jaw occlusion. No trismus.      Comments: Patient has large dental caries in her third molars.  No gingival inflammation or induration.  No trismus, drooling, or stridor.  Eyes:      Extraocular Movements: Extraocular movements intact.   Cardiovascular:      Rate and Rhythm: Normal rate and regular rhythm.   Pulmonary:      Effort: Pulmonary effort is normal.      Breath sounds: Normal breath sounds.   Musculoskeletal:      Cervical back: Normal range of motion and neck supple.   Neurological:      General: No focal deficit present.      Mental Status: She is alert and oriented to person, place, and time.   Psychiatric:         Behavior: Behavior normal.         Procedures         Lab Results (last 24 hours)       Procedure Component Value Units Date/Time    COVID-19,CEPHEID/RADHA,COR/FAUSTINO/PAD/JAMAR/LAG IN-HOUSE,NP SWAB IN TRANSPORT MEDIA 1 HR TAT, RT-PCR - Swab, Nasopharynx [[id number]]  (Normal) Collected: 11/12/23 1649    Specimen: Swab from Nasopharynx Updated: 11/12/23 1736     COVID19 Not Detected    Narrative:      Fact sheet for providers: https://www.fda.gov/media/608629/download     Fact sheet for patients: https://www.fda.gov/media/692638/download  Fact sheet for providers: https://www.fda.gov/media/296742/download     Fact sheet for patients: https://www.fda.gov/media/091883/download            No results found.    ED Course  ED Course as of 11/12/23 1751   Sun Nov " 12, 2023 1750 Patient advised to follow-up with her oral surgeon as scheduled.  She is negative for COVID test. [TK]      ED Course User Index  [TK] Mayda Christian PA          [facility]      Final diagnoses:   Lab test negative for COVID-19 virus   Dental caries     Disposition: Patient be discharged in stable condition       Mayda Christian PA  11/12/23 6191

## 2024-01-01 ENCOUNTER — APPOINTMENT (OUTPATIENT)
Dept: GENERAL RADIOLOGY | Facility: HOSPITAL | Age: 29
End: 2024-01-01
Payer: MEDICAID

## 2024-01-01 ENCOUNTER — HOSPITAL ENCOUNTER (EMERGENCY)
Facility: HOSPITAL | Age: 29
Discharge: HOME | End: 2024-01-01
Admitting: FAMILY MEDICINE
Payer: MEDICAID

## 2024-01-01 VITALS
DIASTOLIC BLOOD PRESSURE: 69 MMHG | HEART RATE: 100 BPM | BODY MASS INDEX: 38.32 KG/M2 | TEMPERATURE: 98.5 F | SYSTOLIC BLOOD PRESSURE: 132 MMHG | OXYGEN SATURATION: 100 % | WEIGHT: 230 LBS | RESPIRATION RATE: 18 BRPM | HEIGHT: 65 IN

## 2024-01-01 DIAGNOSIS — J06.9 VIRAL URI: Primary | ICD-10-CM

## 2024-01-01 LAB
FLUAV RNA RESP QL NAA+PROBE: NOT DETECTED
FLUBV RNA RESP QL NAA+PROBE: NOT DETECTED
RSV AG SPEC QL: NEGATIVE
S PYO AG THROAT QL: NEGATIVE
SARS-COV-2 RNA RESP QL NAA+PROBE: NOT DETECTED

## 2024-01-01 PROCEDURE — 87636 SARSCOV2 & INF A&B AMP PRB: CPT | Performed by: PHYSICIAN ASSISTANT

## 2024-01-01 PROCEDURE — 87081 CULTURE SCREEN ONLY: CPT | Performed by: PHYSICIAN ASSISTANT

## 2024-01-01 PROCEDURE — 99283 EMERGENCY DEPT VISIT LOW MDM: CPT

## 2024-01-01 PROCEDURE — 71045 X-RAY EXAM CHEST 1 VIEW: CPT

## 2024-01-01 PROCEDURE — 87880 STREP A ASSAY W/OPTIC: CPT | Performed by: PHYSICIAN ASSISTANT

## 2024-01-01 PROCEDURE — 87807 RSV ASSAY W/OPTIC: CPT | Performed by: PHYSICIAN ASSISTANT

## 2024-01-01 RX ORDER — ONDANSETRON 4 MG/1
4 TABLET, ORALLY DISINTEGRATING ORAL EVERY 6 HOURS PRN
Qty: 12 TABLET | Refills: 0 | Status: SHIPPED | OUTPATIENT
Start: 2024-01-01

## 2024-01-01 NOTE — Clinical Note
[facility] EMERGENCY DEPARTMENT  [address]  [facility] [address]  Phone: [phone]    Dada Garcia was seen and treated in our emergency department on 1/1/2024.  She may return to work on 01/04/2024.         Thank you for choosing [facility].    Ahmet Coronado PA-C

## 2024-01-02 NOTE — DISCHARGE INSTRUCTIONS
Today your COVID, influenza, RSV testing is negative.  Your chest x-ray is well-appearing with no pneumonia or bronchitis.  It is likely that you have adenovirus as your daughters do.  For this please treat yourself symptomatically with increase rest, increase hydration.  Please follow-up with your primary care provider within the next 48 hours for close reevaluation.  Should you develop any new or worsening symptoms please return to the ER for further evaluation.

## 2024-01-02 NOTE — ED PROVIDER NOTES
Subjective   History of Present Illness    Patient is a 28-year-old female presenting to ED with URI symptoms.  PMH significant for hypothyroidism, hyperprolactinemia, benign neoplasm of pituitary gland.  Patient states that a few weeks ago her 5-month-old daughter developed coronavirus and adenovirus for which she had similar but milder symptoms.  Patient states that over the past couple days his symptoms have returned and 2 of her children as well as herself for which all 3 are presented in the ER.  Patient states that she is having a mild headache, congestion, sore throat, as well as coughing.  Patient denies any fevers, chills, diaphoresis, nausea, vomiting, or diarrhea.  Patient denies use of any medications prior to arrival and presents at this time for further evaluation.    Records reviewed show patient was last seen in the ED on 11/12/2023 for negative COVID-19, dental caries.    Patient was recently seen outpatient maternal-fetal medicine on 3/13/2023 at 18 weeks gestation.    Review of Systems   Constitutional: Negative.  Negative for chills, diaphoresis and fever.   HENT:  Positive for congestion and sore throat. Negative for trouble swallowing.    Eyes: Negative.    Respiratory:  Positive for cough. Negative for chest tightness and shortness of breath.    Cardiovascular: Negative.    Gastrointestinal: Negative.  Negative for abdominal pain, diarrhea, nausea and vomiting.   Genitourinary: Negative.    Musculoskeletal: Negative.  Negative for myalgias and neck pain.   Skin: Negative.  Negative for rash.   Neurological: Negative.    Psychiatric/Behavioral: Negative.     All other systems reviewed and are negative.      Past Medical History:   Diagnosis Date    Amenorrhea     Benign neoplasm of pituitary gland     Galactorrhea not associated with childbirth     Galactorrhea due to non-obstetric cause       GERD (gastroesophageal reflux disease)     Gestational hypertension     IN THIRD TRIMESTER PER HX     Hyperprolactinemia     Hyperthyroidism     Prolactinoma        No Known Allergies    Past Surgical History:   Procedure Laterality Date    NO PAST SURGERIES         Family History   Problem Relation Age of Onset    Heart disease Maternal Grandmother     Cancer Paternal Grandmother     Diabetes Paternal Grandfather        Social History     Socioeconomic History    Marital status: Single   Tobacco Use    Smoking status: Never    Smokeless tobacco: Never   Vaping Use    Vaping Use: Never used   Substance and Sexual Activity    Alcohol use: No    Drug use: No    Sexual activity: Yes     Partners: Male           Objective   Physical Exam  Vitals and nursing note reviewed.   Constitutional:       General: She is not in acute distress.     Appearance: Normal appearance. She is well-developed and well-groomed. She is not ill-appearing, toxic-appearing or diaphoretic.   HENT:      Head: Normocephalic.      Mouth/Throat:      Mouth: Mucous membranes are moist.      Pharynx: Oropharynx is clear. No oropharyngeal exudate or posterior oropharyngeal erythema.   Eyes:      General: No scleral icterus.     Conjunctiva/sclera: Conjunctivae normal.      Pupils: Pupils are equal, round, and reactive to light.   Cardiovascular:      Rate and Rhythm: Regular rhythm. Tachycardia present.   Pulmonary:      Effort: Pulmonary effort is normal.      Breath sounds: Normal breath sounds.   Abdominal:      General: Bowel sounds are normal.      Palpations: Abdomen is soft.      Tenderness: There is no abdominal tenderness.   Musculoskeletal:         General: Normal range of motion.      Cervical back: Normal range of motion and neck supple.   Skin:     General: Skin is warm and dry.   Neurological:      Mental Status: She is alert and oriented to person, place, and time.      Gait: Gait normal.   Psychiatric:         Mood and Affect: Mood normal.         Behavior: Behavior normal. Behavior is cooperative.         Procedures           ED  Course                                             Medical Decision Making  Problems Addressed:  Viral URI: complicated acute illness or injury    Amount and/or Complexity of Data Reviewed  External Data Reviewed: labs, radiology and notes.  Labs: ordered. Decision-making details documented in ED Course.  Radiology: ordered. Decision-making details documented in ED Course.  ECG/medicine tests: ordered. Decision-making details documented in ED Course.    Risk  Prescription drug management.      Patient is a 28-year-old female presenting to ED with URI symptoms.  PMH significant for hypothyroidism, hyperprolactinemia, benign neoplasm of pituitary gland.  Upon initial evaluation patient resting comfortably in bed with hypertension however otherwise stable vital signs.  Patient is non-ill and nontoxic appearing.  HEENT examination  revealed no evidence of otitis media or otitis externa.  Posterior oropharynx erythema with no exudates or enlargement.  RSV, strep, COVID, influenza testing negative.  Chest x-ray showed: No acute process in the chest, no visualized infiltrate.  Patient tolerated p.o. fluids throughout evaluation with no difficulty.  Patient's abdomen continues to remain soft and nontender and lungs were clear to auscultation bilaterally.  While in ED patient's 2 daughters tested positive for adenovirus for which patient was advised she is at high risk due to her close contact and caring for these daughters.  Discussed need for symptomatic treatment to include increased rest, increased hydration, over-the-counter use of Motrin and Tylenol.  Discussed with patient need for PCP follow-up within the next 48 hours for close reevaluation, strict return precautions, and need for immediate return to ED should she develop any new or worsening symptoms.  Patient is very appreciative with no further questions, concerns, needs at this time and patient is stable for discharge.    Differential diagnosis: RSV, strep  pharyngitis, COVID-19, influenza, bronchitis, pneumonia, viral URI, bronchiolitis.    Final diagnoses:   Viral URI       ED Disposition  ED Disposition       ED Disposition   Discharge    Condition   Stable    Comment   --               Samantha Hernandez, APRN  [address] [facility] [facility], [facility] [address]  [facility] [address]  [phone]    Schedule an appointment as soon as possible for a visit in 2 days      [facility] EMERGENCY DEPARTMENT  [address] [place] [address] [phone]    As needed         Medication List        New Prescriptions      ondansetron ODT 4 MG disintegrating tablet  Commonly known as: ZOFRAN-ODT  Place 1 tablet on the tongue Every 6 (Six) Hours As Needed for Nausea.               Where to Get Your Medications        These medications were sent to [facility] - [address] - [phone]  - [phone] [address] [address]      Phone: [phone]   ondansetron ODT 4 MG disintegrating tablet            Ahmet Coronado PA-C  01/02/24 0008

## 2024-01-04 LAB — BACTERIA SPEC AEROBE CULT: NORMAL

## 2024-04-04 NOTE — DISCHARGE INSTRUCTIONS
Please read and follow all directions.  Tylenol or ibuprofen for discomfort as needed.  Start Pepcid and take as directed.  Take all home medications as previously prescribed.  Please contact your primary care provider in 2-3 days to arrange for outpatient follow-up.  If you do not have one you may use the list below.  Return to the emergency department sooner if symptoms worsen or for any additional concerns.      Follow up with one of the [facility] physician groups below to setup primary care. If you have trouble following up, please call the [facility] Nurse Line at [phone]    (Dr. Rowdy Padron DO, Dr. Ayo Wilson DO,  RYAN Artis, and RYAN Padgett)  [facility], Primary Care   [address] [place] [address], [address], [place] [address] [phone]     (Dr. Celeste Villatoro MD, RYAN Cervantes, and RYAN Grubbs)  [facility], Primary Care   [facility] [address], [place] [address] [phone]    (Dr. Mauri Gan MD and Dr. Niels Santana MD)  [facility], Primary Care  [address], [address] [phone]    (Dr. Raffy Crockett MD)  [facility], Primary Care  [address] [facility], [address], [place], [place], [address] [phone]    
no

## 2025-02-03 ENCOUNTER — HOSPITAL ENCOUNTER (EMERGENCY)
Facility: HOSPITAL | Age: 30
Discharge: HOME | End: 2025-02-03
Admitting: EMERGENCY MEDICINE
Payer: MEDICAID

## 2025-02-03 ENCOUNTER — APPOINTMENT (OUTPATIENT)
Dept: GENERAL RADIOLOGY | Facility: HOSPITAL | Age: 30
End: 2025-02-03
Payer: MEDICAID

## 2025-02-03 VITALS
SYSTOLIC BLOOD PRESSURE: 153 MMHG | DIASTOLIC BLOOD PRESSURE: 89 MMHG | HEART RATE: 90 BPM | WEIGHT: 267 LBS | HEIGHT: 65 IN | OXYGEN SATURATION: 97 % | BODY MASS INDEX: 44.48 KG/M2 | TEMPERATURE: 98.5 F | RESPIRATION RATE: 16 BRPM

## 2025-02-03 DIAGNOSIS — J98.8 VIRAL RESPIRATORY INFECTION: ICD-10-CM

## 2025-02-03 DIAGNOSIS — B34.8 RHINOVIRUS: ICD-10-CM

## 2025-02-03 DIAGNOSIS — B97.89 VIRAL RESPIRATORY INFECTION: ICD-10-CM

## 2025-02-03 DIAGNOSIS — Z20.828 EXPOSURE TO THE FLU: Primary | ICD-10-CM

## 2025-02-03 DIAGNOSIS — Z20.2 POSSIBLE EXPOSURE TO STI: ICD-10-CM

## 2025-02-03 LAB
B PARAPERT DNA SPEC QL NAA+PROBE: NOT DETECTED
B PERT DNA SPEC QL NAA+PROBE: NOT DETECTED
B-HCG UR QL: NEGATIVE
BACTERIA UR QL AUTO: ABNORMAL /HPF
BILIRUB UR QL STRIP: NEGATIVE
C PNEUM DNA NPH QL NAA+NON-PROBE: NOT DETECTED
CLARITY UR: ABNORMAL
CLUE CELLS SPEC QL WET PREP: ABNORMAL
COLOR UR: ABNORMAL
EXPIRATION DATE: NORMAL
FLUAV SUBTYP SPEC NAA+PROBE: NOT DETECTED
FLUBV RNA ISLT QL NAA+PROBE: NOT DETECTED
GLUCOSE UR STRIP-MCNC: NEGATIVE MG/DL
HADV DNA SPEC NAA+PROBE: NOT DETECTED
HCOV 229E RNA SPEC QL NAA+PROBE: NOT DETECTED
HCOV HKU1 RNA SPEC QL NAA+PROBE: NOT DETECTED
HCOV NL63 RNA SPEC QL NAA+PROBE: DETECTED
HCOV OC43 RNA SPEC QL NAA+PROBE: NOT DETECTED
HGB UR QL STRIP.AUTO: ABNORMAL
HMPV RNA NPH QL NAA+NON-PROBE: NOT DETECTED
HOLD SPECIMEN: NORMAL
HPIV1 RNA ISLT QL NAA+PROBE: NOT DETECTED
HPIV2 RNA SPEC QL NAA+PROBE: NOT DETECTED
HPIV3 RNA NPH QL NAA+PROBE: NOT DETECTED
HPIV4 P GENE NPH QL NAA+PROBE: NOT DETECTED
HYALINE CASTS UR QL AUTO: ABNORMAL /LPF
HYDATID CYST SPEC WET PREP: ABNORMAL
INTERNAL NEGATIVE CONTROL: NEGATIVE
INTERNAL POSITIVE CONTROL: POSITIVE
KETONES UR QL STRIP: ABNORMAL
LEUKOCYTE ESTERASE UR QL STRIP.AUTO: ABNORMAL
Lab: NORMAL
M PNEUMO IGG SER IA-ACNC: NOT DETECTED
NITRITE UR QL STRIP: NEGATIVE
PH UR STRIP.AUTO: 5.5 [PH] (ref 5–8)
PROT UR QL STRIP: ABNORMAL
RBC # UR STRIP: ABNORMAL /HPF
REF LAB TEST METHOD: ABNORMAL
RHINOVIRUS RNA SPEC NAA+PROBE: DETECTED
RSV RNA NPH QL NAA+NON-PROBE: NOT DETECTED
SARS-COV-2 RNA RESP QL NAA+PROBE: NOT DETECTED
SP GR UR STRIP: >=1.03 (ref 1–1.03)
SQUAMOUS #/AREA URNS HPF: ABNORMAL /HPF
T VAGINALIS SPEC QL WET PREP: ABNORMAL
UROBILINOGEN UR QL STRIP: ABNORMAL
WBC # UR STRIP: ABNORMAL /HPF
WBC SPEC QL WET PREP: ABNORMAL
YEAST GENITAL QL WET PREP: ABNORMAL

## 2025-02-03 PROCEDURE — 87491 CHLMYD TRACH DNA AMP PROBE: CPT | Performed by: PHYSICIAN ASSISTANT

## 2025-02-03 PROCEDURE — 87661 TRICHOMONAS VAGINALIS AMPLIF: CPT | Performed by: PHYSICIAN ASSISTANT

## 2025-02-03 PROCEDURE — 81025 URINE PREGNANCY TEST: CPT | Performed by: PHYSICIAN ASSISTANT

## 2025-02-03 PROCEDURE — 25010000002 CEFTRIAXONE PER 250 MG: Performed by: PHYSICIAN ASSISTANT

## 2025-02-03 PROCEDURE — 96372 THER/PROPH/DIAG INJ SC/IM: CPT

## 2025-02-03 PROCEDURE — 81001 URINALYSIS AUTO W/SCOPE: CPT | Performed by: PHYSICIAN ASSISTANT

## 2025-02-03 PROCEDURE — 25010000002 LIDOCAINE PF 1% 1 % SOLUTION 5 ML VIAL: Performed by: PHYSICIAN ASSISTANT

## 2025-02-03 PROCEDURE — 87210 SMEAR WET MOUNT SALINE/INK: CPT | Performed by: PHYSICIAN ASSISTANT

## 2025-02-03 PROCEDURE — 87591 N.GONORRHOEAE DNA AMP PROB: CPT | Performed by: PHYSICIAN ASSISTANT

## 2025-02-03 PROCEDURE — 99284 EMERGENCY DEPT VISIT MOD MDM: CPT

## 2025-02-03 PROCEDURE — 0202U NFCT DS 22 TRGT SARS-COV-2: CPT | Performed by: FAMILY MEDICINE

## 2025-02-03 RX ORDER — AZITHROMYCIN 250 MG/1
1000 TABLET, FILM COATED ORAL ONCE
Status: COMPLETED | OUTPATIENT
Start: 2025-02-03 | End: 2025-02-03

## 2025-02-03 RX ORDER — OSELTAMIVIR PHOSPHATE 75 MG/1
75 CAPSULE ORAL 2 TIMES DAILY
Qty: 14 CAPSULE | Refills: 0 | Status: SHIPPED | OUTPATIENT
Start: 2025-02-03

## 2025-02-03 RX ORDER — DOXYCYCLINE 100 MG/1
100 CAPSULE ORAL 2 TIMES DAILY
Qty: 14 CAPSULE | Refills: 0 | Status: SHIPPED | OUTPATIENT
Start: 2025-02-03

## 2025-02-03 RX ORDER — METRONIDAZOLE 500 MG/1
500 TABLET ORAL 3 TIMES DAILY
Qty: 21 TABLET | Refills: 0 | Status: SHIPPED | OUTPATIENT
Start: 2025-02-03

## 2025-02-03 RX ADMIN — AZITHROMYCIN 1000 MG: 250 TABLET, FILM COATED ORAL at 13:24

## 2025-02-03 RX ADMIN — LIDOCAINE HYDROCHLORIDE 1 G: 10 INJECTION, SOLUTION EPIDURAL; INFILTRATION; INTRACAUDAL; PERINEURAL at 13:26

## 2025-02-03 NOTE — DISCHARGE INSTRUCTIONS
Please follow with your primary care provider and OB/GYN.  Recommend follow-up with MyChart results in the next 3 to 5 days.  Return to the ER for any acute worrisome issues.

## 2025-02-03 NOTE — Clinical Note
[facility] EMERGENCY DEPARTMENT  [address]  [facility] [address]  Phone: [phone]    Dada Garcia was seen and treated in our emergency department on 2/3/2025.  She may return to work on 02/07/2025.         Thank you for choosing [facility].    Mayda Christian PA

## 2025-02-03 NOTE — ED PROVIDER NOTES
Subjective   History of Present Illness    Patient is a 29-year-old female chief complaint of 1) want to make sure she had the flu since her daughter had symptoms and 2) wanted to be checked for STDs since the children's father was possibly exposed to some type of STI.    Patient denies any complaints.  She denies any cough or congestion.  She denies dysuria, hematuria, flank pain, vaginal bleeding or discharge.  She wanted to be checked empirically.    Review of Systems   All other systems reviewed and are negative.      Past Medical History:   Diagnosis Date    Amenorrhea     Benign neoplasm of pituitary gland     Galactorrhea not associated with childbirth     Galactorrhea due to non-obstetric cause       GERD (gastroesophageal reflux disease)     Gestational hypertension     IN THIRD TRIMESTER PER HX    Hyperprolactinemia     Hyperthyroidism     Prolactinoma        No Known Allergies    Past Surgical History:   Procedure Laterality Date    NO PAST SURGERIES         Family History   Problem Relation Age of Onset    Heart disease Maternal Grandmother     Cancer Paternal Grandmother     Diabetes Paternal Grandfather        Social History     Socioeconomic History    Marital status: Single   Tobacco Use    Smoking status: Never    Smokeless tobacco: Never   Vaping Use    Vaping status: Never Used   Substance and Sexual Activity    Alcohol use: No    Drug use: No    Sexual activity: Yes     Partners: Male       Prior to Admission medications    Medication Sig Start Date End Date Taking? Authorizing Provider   ibuprofen (ADVIL,MOTRIN) 600 MG tablet Take 1 tablet by mouth Every 6 (Six) Hours As Needed for Mild Pain (First Line: Mild pain.). 7/27/23   Xiomara Rees MD   NIFEdipine (PROCARDIA) 10 MG capsule Take 3 capsules by mouth Every 8 (Eight) Hours. 7/27/23   Xiomara Rees MD   ondansetron ODT (ZOFRAN-ODT) 4 MG disintegrating tablet Place 1 tablet on the tongue Every 6 (Six) Hours As Needed for  "Nausea. 1/1/24   Ahmet Coronado PA-C   pantoprazole (Protonix) 40 MG EC tablet Take 1 tablet by mouth Daily. 11/16/21   Samantha Hernandez APRN   penicillin v potassium (VEETID) 500 MG tablet Take 1 tablet by mouth 4 (Four) Times a Day. 11/12/23   Mayda Christian PA       Medications   azithromycin (ZITHROMAX) tablet 1,000 mg (1,000 mg Oral Given 2/3/25 1324)   cefTRIAXone (ROCEPHIN) 1 g in lidocaine PF 1% (XYLOCAINE) IM only syringe (1 g Intramuscular Given 2/3/25 1326)       /87 (BP Location: Right arm, Patient Position: Sitting)   Pulse 95   Temp 98.2 °F (36.8 °C) (Oral)   Resp 18   Ht 165.1 cm (65\")   Wt 121 kg (267 lb)   SpO2 97%   BMI 44.43 kg/m²       Objective   Physical Exam  Vitals reviewed. Exam conducted with a chaperone present.   Constitutional:       Appearance: Normal appearance. She is obese.   HENT:      Head: Normocephalic and atraumatic.      Nose: Nose normal.      Mouth/Throat:      Mouth: Mucous membranes are moist.   Eyes:      Extraocular Movements: Extraocular movements intact.   Cardiovascular:      Rate and Rhythm: Normal rate and regular rhythm.   Pulmonary:      Effort: Pulmonary effort is normal.      Breath sounds: Normal breath sounds.   Abdominal:      General: Bowel sounds are normal.      Palpations: Abdomen is soft.      Tenderness: There is no abdominal tenderness.   Genitourinary:     General: Normal vulva.      Vagina: Normal.      Cervix: Normal. No cervical bleeding.   Musculoskeletal:         General: Normal range of motion.      Cervical back: Normal range of motion.   Skin:     General: Skin is warm.      Capillary Refill: Capillary refill takes less than 2 seconds.   Neurological:      Mental Status: She is alert and oriented to person, place, and time.   Psychiatric:         Mood and Affect: Mood normal.         Behavior: Behavior normal.         Procedures         Lab Results (last 24 hours)       Procedure Component Value Units Date/Time    " Respiratory Panel PCR w/COVID-19(SARS-CoV-2) [facility]A/BERENICE/FAUSTINO/PAD/COR/SHANNA In-House, NP Swab in UTM/VTM, 2 HR TAT - Swab, Nasopharynx [[id number]]  (Abnormal) Collected: 02/03/25 1319    Specimen: Swab from Nasopharynx Updated: 02/03/25 1427     ADENOVIRUS, PCR Not Detected     Coronavirus 229E Not Detected     Coronavirus HKU1 Not Detected     Coronavirus NL63 Detected     Coronavirus OC43 Not Detected     COVID19 Not Detected     Human Metapneumovirus Not Detected     Human Rhinovirus/Enterovirus Detected     Influenza A PCR Not Detected     Influenza B PCR Not Detected     Parainfluenza Virus 1 Not Detected     Parainfluenza Virus 2 Not Detected     Parainfluenza Virus 3 Not Detected     Parainfluenza Virus 4 Not Detected     RSV, PCR Not Detected     Bordetella pertussis pcr Not Detected     Bordetella parapertussis PCR Not Detected     Chlamydophila pneumoniae PCR Not Detected     Mycoplasma pneumo by PCR Not Detected    Narrative:      In the setting of a positive respiratory panel with a viral infection PLUS a negative procalcitonin without other underlying concern for bacterial infection, consider observing off antibiotics or discontinuation of antibiotics and continue supportive care. If the respiratory panel is positive for atypical bacterial infection (Bordetella pertussis, Chlamydophila pneumoniae, or Mycoplasma pneumoniae), consider antibiotic de-escalation to target atypical bacterial infection.    [facility]kins Urine - Urine, Clean Catch [[id number]] Collected: 02/03/25 1321    Specimen: Urine, Clean Catch Updated: 02/03/25 1330     Extra Tube Hold for add-ons.     Comment: Auto resulted.       Urinalysis With Microscopic If Indicated (No Culture) - Urine, Clean Catch [[id number]]  (Abnormal) Collected: 02/03/25 1321    Specimen: Urine, Clean Catch Updated: 02/03/25 1348     Color, UA Dark Yellow     Appearance, UA Cloudy     pH, UA 5.5     Specific Gravity, UA >=1.030     Glucose, UA Negative     Ketones, UA Trace      Bilirubin, UA Negative     Blood, UA Trace     Protein, UA Trace     Leuk Esterase, UA Moderate (2+)     Nitrite, UA Negative     Urobilinogen, UA 1.0 E.U./dL    Urinalysis, Microscopic Only - Urine, Clean Catch [[id number]]  (Abnormal) Collected: 02/03/25 1321    Specimen: Urine, Clean Catch Updated: 02/03/25 1348     RBC, UA 21-50 /HPF      WBC, UA 21-50 /HPF      Bacteria, UA 2+ /HPF      Squamous Epithelial Cells, UA Too Numerous to Count /HPF      Hyaline Casts, UA 7-12 /LPF      Methodology Automated Microscopy    POC Urine Pregnancy [[id number]]  (Normal) Collected: 02/03/25 1323    Specimen: Urine Updated: 02/03/25 1324     HCG, Urine, QL Negative     Lot Number [id number]     Internal Positive Control Positive     Internal Negative Control Negative     Expiration Date 04/22/2026    Chlamydia trachomatis, Neisseria gonorrhoeae, PCR - Swab, Cervix [[id number]] Collected: 02/03/25 1444    Specimen: Swab from Cervix Updated: 02/03/25 1448    Trichomonas vaginalis, PCR - Swab, Cervix [[id number]] Collected: 02/03/25 1444    Specimen: Swab from Cervix Updated: 02/03/25 1448    Wet Prep, Genital - Swab, Vagina [[id number]]  (Abnormal) Collected: 02/03/25 1444    Specimen: Swab from Vagina Updated: 02/03/25 1505     YEAST No yeast seen     HYPHAL ELEMENTS No Hyphal elements seen     WBC'S 1+ WBC's seen     Clue Cells, Wet Prep No Clue cells seen     Trichomonas, Wet Prep No Trichomonas seen            No results found.    ED Course          MDM      Final diagnoses:   Exposure to the flu   Rhinovirus   Viral respiratory infection   Possible exposure to STI       Disposition: Patient will be discharged in stable condition.       Mayda Christian PA  02/03/25 1448       Mayda Christian PA  02/03/25 1510

## 2025-02-04 LAB
C TRACH RRNA CVX QL NAA+PROBE: NOT DETECTED
N GONORRHOEA RRNA SPEC QL NAA+PROBE: NOT DETECTED
TRICHOMONAS VAGINALIS PCR: NOT DETECTED

## 2025-03-02 ENCOUNTER — NURSE TRIAGE (OUTPATIENT)
Dept: CALL CENTER | Facility: HOSPITAL | Age: 30
End: 2025-03-02
Payer: MEDICAID

## 2025-03-02 NOTE — TELEPHONE ENCOUNTER
"Has wisdom tooth on right side of mouth is literally falling out, still has a part stuck, cannot eat, it is so painful, have to keep my mouth closed for the pain, have called to find dentist, cannot find one with my insurance . Told her there is a Dentist in [place] that takes Medicaid not sure which one, told her to try, to look up on Internet, for [place] Medicaid Provider Directory, she will try this,if pain becomes worse can try ER,   Reason for Disposition   Cracked or chipped tooth    Additional Information   Negative: Shock suspected (e.g., cold/pale/clammy skin, too weak to stand, low BP, rapid pulse)   Negative: [1] Similar pain previously AND [2] it was from \"heart attack\"   Negative: [1] Similar pain previously AND [2] it was from \"angina\" AND [3] not relieved by nitroglycerin   Negative: Sounds like a life-threatening emergency to the triager   Negative: Chest pain   Negative: Toothache followed tooth injury   Negative: Toothache or mouth pain after tooth extraction   Negative: Canker sore (i.e., aphthous ulcer)   Negative: Tongue is very swollen and tender   Negative: [1] Face is swollen AND [2] fever   Negative: Patient sounds very sick or weak to the triager   Negative: [1] SEVERE pain (e.g., excruciating, unable to eat, unable to do any normal activities) AND [2] not improved 2 hours after pain medicine   Negative: Face is very swollen   Negative: Fever   Negative: [1] Face is swollen AND [2] no fever   Negative: Toothache present > 24 hours   Negative: [1] Toothache AND [2] intermittent AND [3] brought on by hot or cold liquids   Negative: Brown cavity visible in the painful tooth   Negative: Red or yellow lump present at the gum line of the painful tooth   Negative: Lost crown   Negative: Lost filling   Negative: Broken braces wire or end of braces wire is jabbing into gum, cheek, or tongue    Answer Assessment - Initial Assessment Questions  1. LOCATION: \"Which tooth is hurting?\"  (e.g., " "right-side/left-side, upper/lower, front/back)      Back wisdom tooth right side  2. ONSET: \"When did the toothache start?\"  (e.g., hours, days)       Started hurting 2 days ago  3. SEVERITY: \"How bad is the toothache?\"  (Scale 1-10; mild, moderate or severe)    - MILD (1-3): doesn't interfere with chewing     - MODERATE (4-7): interferes with chewing, interferes with normal activities, awakens from sleep      - SEVERE (8-10): unable to eat, unable to do any normal activities, excruciating pain         Pain rated 10  4. SWELLING: \"Is there any visible swelling of your face?\"      No swelling in face  5. OTHER SYMPTOMS: \"Do you have any other symptoms?\" (e.g., fever)      No fever took mostly out  6. PREGNANCY: \"Is there any chance you are pregnant?\" \"When was your last menstrual period?\"      no    Protocols used: Toothache-ADULT-AH    "

## 2025-05-05 ENCOUNTER — TELEPHONE (OUTPATIENT)
Dept: INTERNAL MEDICINE | Facility: CLINIC | Age: 30
End: 2025-05-05

## 2025-05-05 ENCOUNTER — LAB (OUTPATIENT)
Dept: LAB | Facility: HOSPITAL | Age: 30
End: 2025-05-05
Payer: MEDICAID

## 2025-05-05 ENCOUNTER — OFFICE VISIT (OUTPATIENT)
Dept: INTERNAL MEDICINE | Facility: CLINIC | Age: 30
End: 2025-05-05
Payer: MEDICAID

## 2025-05-05 VITALS
TEMPERATURE: 98.1 F | SYSTOLIC BLOOD PRESSURE: 140 MMHG | HEIGHT: 65 IN | WEIGHT: 277 LBS | HEART RATE: 84 BPM | BODY MASS INDEX: 46.15 KG/M2 | OXYGEN SATURATION: 97 % | DIASTOLIC BLOOD PRESSURE: 105 MMHG | RESPIRATION RATE: 16 BRPM

## 2025-05-05 DIAGNOSIS — E22.1 HYPERPROLACTINEMIA: ICD-10-CM

## 2025-05-05 DIAGNOSIS — E66.813 CLASS 3 OBESITY: ICD-10-CM

## 2025-05-05 DIAGNOSIS — Z00.00 ENCOUNTER FOR ANNUAL WELLNESS VISIT: ICD-10-CM

## 2025-05-05 DIAGNOSIS — E05.90 HYPERTHYROIDISM: ICD-10-CM

## 2025-05-05 DIAGNOSIS — I10 PRIMARY HYPERTENSION: ICD-10-CM

## 2025-05-05 DIAGNOSIS — K21.9 GASTROESOPHAGEAL REFLUX DISEASE WITHOUT ESOPHAGITIS: ICD-10-CM

## 2025-05-05 DIAGNOSIS — I10 PRIMARY HYPERTENSION: Primary | ICD-10-CM

## 2025-05-05 LAB
ALBUMIN SERPL-MCNC: 4.4 G/DL (ref 3.5–5.2)
ALBUMIN/GLOB SERPL: 1.4 G/DL
ALP SERPL-CCNC: 72 U/L (ref 39–117)
ALT SERPL W P-5'-P-CCNC: 43 U/L (ref 1–33)
ANION GAP SERPL CALCULATED.3IONS-SCNC: 11 MMOL/L (ref 5–15)
AST SERPL-CCNC: 25 U/L (ref 1–32)
BACTERIA UR QL AUTO: ABNORMAL /HPF
BASOPHILS # BLD MANUAL: 0.06 10*3/MM3 (ref 0–0.2)
BASOPHILS NFR BLD MANUAL: 1 % (ref 0–1.5)
BILIRUB SERPL-MCNC: 0.6 MG/DL (ref 0–1.2)
BILIRUB UR QL STRIP: NEGATIVE
BUN SERPL-MCNC: 10 MG/DL (ref 6–20)
BUN/CREAT SERPL: 13.9 (ref 7–25)
CALCIUM SPEC-SCNC: 9.2 MG/DL (ref 8.6–10.5)
CHLORIDE SERPL-SCNC: 104 MMOL/L (ref 98–107)
CHOLEST SERPL-MCNC: 132 MG/DL (ref 0–200)
CLARITY UR: CLEAR
CO2 SERPL-SCNC: 26 MMOL/L (ref 22–29)
COLOR UR: YELLOW
CREAT SERPL-MCNC: 0.72 MG/DL (ref 0.57–1)
DEPRECATED RDW RBC AUTO: 41.2 FL (ref 37–54)
EGFRCR SERPLBLD CKD-EPI 2021: 114.8 ML/MIN/1.73
EOSINOPHIL # BLD MANUAL: 0.06 10*3/MM3 (ref 0–0.4)
EOSINOPHIL NFR BLD MANUAL: 1 % (ref 0.3–6.2)
ERYTHROCYTE [DISTWIDTH] IN BLOOD BY AUTOMATED COUNT: 12.4 % (ref 12.3–15.4)
GLOBULIN UR ELPH-MCNC: 3.2 GM/DL
GLUCOSE SERPL-MCNC: 85 MG/DL (ref 65–99)
GLUCOSE UR STRIP-MCNC: NEGATIVE MG/DL
HCT VFR BLD AUTO: 40.9 % (ref 34–46.6)
HDLC SERPL-MCNC: 44 MG/DL (ref 40–60)
HGB BLD-MCNC: 13.5 G/DL (ref 12–15.9)
HGB UR QL STRIP.AUTO: ABNORMAL
HYALINE CASTS UR QL AUTO: ABNORMAL /LPF
KETONES UR QL STRIP: NEGATIVE
LDLC SERPL CALC-MCNC: 68 MG/DL (ref 0–100)
LDLC/HDLC SERPL: 1.51 {RATIO}
LEUKOCYTE ESTERASE UR QL STRIP.AUTO: NEGATIVE
LYMPHOCYTES # BLD MANUAL: 4.11 10*3/MM3 (ref 0.7–3.1)
LYMPHOCYTES NFR BLD MANUAL: 4 % (ref 5–12)
MCH RBC QN AUTO: 30.1 PG (ref 26.6–33)
MCHC RBC AUTO-ENTMCNC: 33 G/DL (ref 31.5–35.7)
MCV RBC AUTO: 91.1 FL (ref 79–97)
MONOCYTES # BLD: 0.25 10*3/MM3 (ref 0.1–0.9)
NEUTROPHILS # BLD AUTO: 1.77 10*3/MM3 (ref 1.7–7)
NEUTROPHILS NFR BLD MANUAL: 28.3 % (ref 42.7–76)
NITRITE UR QL STRIP: NEGATIVE
PH UR STRIP.AUTO: 6 [PH] (ref 5–8)
PLAT MORPH BLD: NORMAL
PLATELET # BLD AUTO: 283 10*3/MM3 (ref 140–450)
PMV BLD AUTO: 10.9 FL (ref 6–12)
POTASSIUM SERPL-SCNC: 3.9 MMOL/L (ref 3.5–5.2)
PROLACTIN SERPL-MCNC: 15.9 NG/ML (ref 4.79–23.3)
PROT SERPL-MCNC: 7.6 G/DL (ref 6–8.5)
PROT UR QL STRIP: NEGATIVE
RBC # BLD AUTO: 4.49 10*6/MM3 (ref 3.77–5.28)
RBC # UR STRIP: ABNORMAL /HPF
RBC MORPH BLD: NORMAL
REF LAB TEST METHOD: ABNORMAL
SODIUM SERPL-SCNC: 141 MMOL/L (ref 136–145)
SP GR UR STRIP: 1.02 (ref 1–1.03)
SQUAMOUS #/AREA URNS HPF: ABNORMAL /HPF
TRIGL SERPL-MCNC: 107 MG/DL (ref 0–150)
UROBILINOGEN UR QL STRIP: ABNORMAL
VARIANT LYMPHS NFR BLD MANUAL: 16.2 % (ref 0–5)
VARIANT LYMPHS NFR BLD MANUAL: 49.5 % (ref 19.6–45.3)
VLDLC SERPL-MCNC: 20 MG/DL (ref 5–40)
WBC # UR STRIP: ABNORMAL /HPF
WBC MORPH BLD: NORMAL
WBC NRBC COR # BLD AUTO: 6.26 10*3/MM3 (ref 3.4–10.8)

## 2025-05-05 PROCEDURE — 84146 ASSAY OF PROLACTIN: CPT

## 2025-05-05 PROCEDURE — 80053 COMPREHEN METABOLIC PANEL: CPT

## 2025-05-05 PROCEDURE — 81001 URINALYSIS AUTO W/SCOPE: CPT

## 2025-05-05 PROCEDURE — 85025 COMPLETE CBC W/AUTO DIFF WBC: CPT

## 2025-05-05 PROCEDURE — 80061 LIPID PANEL: CPT

## 2025-05-05 PROCEDURE — 85007 BL SMEAR W/DIFF WBC COUNT: CPT

## 2025-05-05 PROCEDURE — 36415 COLL VENOUS BLD VENIPUNCTURE: CPT

## 2025-05-05 RX ORDER — LISINOPRIL AND HYDROCHLOROTHIAZIDE 10; 12.5 MG/1; MG/1
1 TABLET ORAL DAILY
Qty: 30 TABLET | Refills: 2 | Status: SHIPPED | OUTPATIENT
Start: 2025-05-05

## 2025-05-05 NOTE — PROGRESS NOTES
Subjective     Chief Complaint   Patient presents with    Obesity     Pt requests script for phentermine 37.5    Edema     Pt states she feels like she is retaining fluid and swelling all over her body    Hypertension       Obesity  Hypertension    29-year-old -American lady presents for her initial visit with me.  She has a history of hypertension and is currently off treatment and her blood pressure on presentation is 140/105.  She also would like to get back on phentermine her BMI was 46.1 today.  She also is concerned about fluid retention and is requesting a diuretic.  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  She also has a history of a pituitary tumor that produced prolactin she list hyperthyroidism but is currently not on any treatments.  I reviewed her past medical history family history allergies and presently she is on no medication    Past Medical History:   Past Medical History:   Diagnosis Date    Amenorrhea     Benign neoplasm of pituitary gland     Galactorrhea not associated with childbirth     Galactorrhea due to non-obstetric cause       GERD (gastroesophageal reflux disease)     Gestational hypertension     IN THIRD TRIMESTER PER HX    Hyperprolactinemia     Hyperthyroidism     Prolactinoma      Past Surgical History:  Past Surgical History:   Procedure Laterality Date    NO PAST SURGERIES       Social History:  reports that she has never smoked. She has never used smokeless tobacco. She reports that she does not drink alcohol and does not use drugs.    Family History: family history includes Cancer in her paternal grandmother; Colon cancer in an other family member; Diabetes in her mother and paternal grandfather; Heart disease in her maternal grandmother; Hypertension in her father and mother; Ovarian cancer in her paternal grandmother; Thyroid disease in an other  "family member.      Allergies:  No Known Allergies  Medications:  Prior to Admission medications    Medication Sig Start Date End Date Taking? Authorizing Provider   lisinopril-hydrochlorothiazide (PRINZIDE,ZESTORETIC) 10-12.5 MG per tablet Take 1 tablet by mouth Daily. 5/5/25   Jono Nino MD   doxycycline (MONODOX) 100 MG capsule Take 1 capsule by mouth 2 (Two) Times a Day. 2/3/25 5/5/25  Mayda Christian PA   ibuprofen (ADVIL,MOTRIN) 600 MG tablet Take 1 tablet by mouth Every 6 (Six) Hours As Needed for Mild Pain (First Line: Mild pain.). 7/27/23 5/5/25  Xiomara Rees MD   metroNIDAZOLE (FLAGYL) 500 MG tablet Take 1 tablet by mouth 3 (Three) Times a Day fro 7 days---AVOID all forms of alcohol while taking this medication and for 72 hours after 2/3/25 5/5/25  Mayda Christian PA   NIFEdipine (PROCARDIA) 10 MG capsule Take 3 capsules by mouth Every 8 (Eight) Hours.  Patient not taking: Reported on 5/5/2025 7/27/23 5/5/25  Xiomara Rees MD   ondansetron ODT (ZOFRAN-ODT) 4 MG disintegrating tablet Place 1 tablet on the tongue Every 6 (Six) Hours As Needed for Nausea. 1/1/24 5/5/25  Ahmet Coronado PA-C   oseltamivir (TAMIFLU) 75 MG capsule Take 1 capsule by mouth 2 (Two) Times a Day. 2/3/25 5/5/25  Mayda Christian PA   pantoprazole (Protonix) 40 MG EC tablet Take 1 tablet by mouth Daily. 11/16/21 5/5/25  Samantha Hernandez APRN   penicillin v potassium (VEETID) 500 MG tablet Take 1 tablet by mouth 4 (Four) Times a Day. 11/12/23 5/5/25  Mayda Christian PA           Review of systems   negative unless otherwise specified above in HPI    Objective     Vital Signs: BP (!) 140/105 (BP Location: Left arm, Patient Position: Sitting, Cuff Size: Other (Comment))   Pulse 84   Temp 98.1 °F (36.7 °C) (Temporal)   Resp 16   Ht 165.1 cm (65\")   Wt 126 kg (277 lb)   SpO2 97%   BMI 46.10 kg/m²     Physical Exam  Vitals and nursing note reviewed. "   Constitutional:       Appearance: She is obese.   HENT:      Head: Normocephalic and atraumatic.      Nose: No congestion.   Eyes:      Extraocular Movements: Extraocular movements intact.      Pupils: Pupils are equal, round, and reactive to light.   Cardiovascular:      Rate and Rhythm: Normal rate and regular rhythm.      Pulses: Normal pulses.      Heart sounds: Normal heart sounds.   Pulmonary:      Effort: Pulmonary effort is normal.      Breath sounds: Normal breath sounds.   Abdominal:      General: Bowel sounds are normal.      Palpations: Abdomen is soft.   Musculoskeletal:         General: Normal range of motion.      Cervical back: Normal range of motion and neck supple.   Skin:     General: Skin is warm and dry.      Capillary Refill: Capillary refill takes less than 2 seconds.   Neurological:      General: No focal deficit present.      Mental Status: She is alert.   Psychiatric:         Mood and Affect: Mood normal.         Class 3 Severe Obesity (BMI >=40). Obesity-related health conditions include the following: hypertension. Obesity is newly identified. BMI is is above average; BMI management plan is completed. We discussed portion control and increasing exercise.            Assessment / Plan     Assessment/Plan:   Diagnosis Plan   1. Primary hypertension  Comprehensive Metabolic Panel    Lipid Panel    Urinalysis With Culture If Indicated - Urine, Clean Catch    lisinopril-hydrochlorothiazide (PRINZIDE,ZESTORETIC) 10-12.5 MG per tablet      2. Class 3 obesity  CBC & Differential    Thyroid Cascade Profile      3. Gastroesophageal reflux disease without esophagitis        4. Hyperthyroidism        5. Hyperprolactinemia  Prolactin      6. Encounter for annual wellness visit        She wanted me to start her on phentermine and I to get her blood pressure under control first.  We also ordered labs as listed above today and start her on lisinopril 1012.51 daily ask her to return in 2 weeks to review  her labs to recheck her blood pressure.  She has an OB/GYN provider      Return in about 2 weeks (around 5/19/2025) for recheck. unless patient needs to be seen sooner or acute issues arise.      I have discussed the patient results/orders and and plan/recommendation with them at today's visit.      Signed by:    Dr. Jono Nino Date: 05/05/25    EMR Dictation/Transcription disclaimer:   Some of this note may be an electronic transcription/translation of spoken language to printed text. The electronic translation of spoken language may permit erroneous, or at times, nonsensical words or phrases to be inadvertently transcribed; Although I have reviewed the note for such errors, some may still exist.

## 2025-05-19 ENCOUNTER — OFFICE VISIT (OUTPATIENT)
Dept: INTERNAL MEDICINE | Facility: CLINIC | Age: 30
End: 2025-05-19
Payer: MEDICAID

## 2025-05-19 VITALS
BODY MASS INDEX: 46.15 KG/M2 | SYSTOLIC BLOOD PRESSURE: 122 MMHG | OXYGEN SATURATION: 98 % | DIASTOLIC BLOOD PRESSURE: 86 MMHG | RESPIRATION RATE: 16 BRPM | HEART RATE: 94 BPM | WEIGHT: 277 LBS | HEIGHT: 65 IN | TEMPERATURE: 98.1 F

## 2025-05-19 DIAGNOSIS — E66.09 CLASS 1 OBESITY DUE TO EXCESS CALORIES WITH SERIOUS COMORBIDITY AND BODY MASS INDEX (BMI) OF 34.0 TO 34.9 IN ADULT: Primary | ICD-10-CM

## 2025-05-19 DIAGNOSIS — E66.811 CLASS 1 OBESITY DUE TO EXCESS CALORIES WITH SERIOUS COMORBIDITY AND BODY MASS INDEX (BMI) OF 34.0 TO 34.9 IN ADULT: Primary | ICD-10-CM

## 2025-05-19 DIAGNOSIS — E66.01 MORBID (SEVERE) OBESITY DUE TO EXCESS CALORIES: ICD-10-CM

## 2025-05-19 DIAGNOSIS — K21.9 GASTROESOPHAGEAL REFLUX DISEASE WITHOUT ESOPHAGITIS: ICD-10-CM

## 2025-05-19 DIAGNOSIS — E05.90 HYPERTHYROIDISM: ICD-10-CM

## 2025-05-19 DIAGNOSIS — I10 PRIMARY HYPERTENSION: ICD-10-CM

## 2025-05-19 PROBLEM — E22.1 HYPERPROLACTINEMIA: Status: RESOLVED | Noted: 2021-11-16 | Resolved: 2025-05-19

## 2025-05-19 PROCEDURE — 1160F RVW MEDS BY RX/DR IN RCRD: CPT | Performed by: INTERNAL MEDICINE

## 2025-05-19 PROCEDURE — 1159F MED LIST DOCD IN RCRD: CPT | Performed by: INTERNAL MEDICINE

## 2025-05-19 PROCEDURE — 99214 OFFICE O/P EST MOD 30 MIN: CPT | Performed by: INTERNAL MEDICINE

## 2025-05-19 PROCEDURE — 3074F SYST BP LT 130 MM HG: CPT | Performed by: INTERNAL MEDICINE

## 2025-05-19 PROCEDURE — 3079F DIAST BP 80-89 MM HG: CPT | Performed by: INTERNAL MEDICINE

## 2025-05-19 RX ORDER — PHENTERMINE HYDROCHLORIDE 37.5 MG/1
37.5 CAPSULE ORAL EVERY MORNING
Qty: 30 CAPSULE | Refills: 1 | Status: SHIPPED | OUTPATIENT
Start: 2025-05-19

## 2025-05-19 NOTE — PROGRESS NOTES
Subjective     Chief Complaint   Patient presents with    Hypertension    Follow-up       History of Present Illness  History of Present Illness  The patient presents for weight management.    She has expressed a desire to resume her phentermine regimen and brought the medication for review. Additionally, she is interested in exploring the possibility of utilizing diabetic injections as part of her weight management strategy. She reports that her blood sugar levels have been normal, and her cholesterol levels are good. She does not smoke. She maintains an active lifestyle, which includes regular walking, although recent wet weather has made it challenging.    SOCIAL HISTORY  She does not smoke.      Past Medical History:   Past Medical History:   Diagnosis Date    Amenorrhea     Benign neoplasm of pituitary gland     Galactorrhea not associated with childbirth     Galactorrhea due to non-obstetric cause       GERD (gastroesophageal reflux disease)     Gestational hypertension     IN THIRD TRIMESTER PER HX    Hyperprolactinemia     Hyperthyroidism     Prolactinoma      Past Surgical History:  Past Surgical History:   Procedure Laterality Date    NO PAST SURGERIES       Social History:  reports that she has never smoked. She has never used smokeless tobacco. She reports that she does not drink alcohol and does not use drugs.    Family History: family history includes Cancer in her paternal grandmother; Colon cancer in an other family member; Diabetes in her mother and paternal grandfather; Heart disease in her maternal grandmother; Hypertension in her father and mother; Ovarian cancer in her paternal grandmother; Thyroid disease in an other family member.      Allergies:  No Known Allergies  Medications:  Prior to Admission medications    Medication Sig Start Date End Date Taking? Authorizing Provider   lisinopril-hydrochlorothiazide (PRINZIDE,ZESTORETIC) 10-12.5 MG per tablet Take 1 tablet by mouth Daily.  "5/5/25   Jono Nino MD   phentermine 37.5 MG capsule Take 1 capsule by mouth Every Morning. 5/19/25   Jono Nino MD   Tirzepatide-Weight Management (ZEPBOUND) 2.5 MG/0.5ML solution auto-injector Inject 0.5 mL under the skin into the appropriate area as directed 1 (One) Time Per Week. 5/19/25   Jono Nino MD           Review of systems   negative unless otherwise specified above in HPI    Objective     Vital Signs: /86 (BP Location: Left arm, Patient Position: Sitting, Cuff Size: Other (Comment))   Pulse 94   Temp 98.1 °F (36.7 °C) (Temporal)   Resp 16   Ht 165.1 cm (65\")   Wt 126 kg (277 lb)   SpO2 98%   BMI 46.10 kg/m²     Physical Exam  Vitals and nursing note reviewed.   Constitutional:       Appearance: She is obese.   HENT:      Head: Normocephalic and atraumatic.      Nose: No congestion.   Eyes:      Extraocular Movements: Extraocular movements intact.      Pupils: Pupils are equal, round, and reactive to light.   Cardiovascular:      Rate and Rhythm: Normal rate and regular rhythm.      Pulses: Normal pulses.      Heart sounds: Normal heart sounds.   Pulmonary:      Effort: Pulmonary effort is normal.      Breath sounds: Normal breath sounds.   Abdominal:      General: Bowel sounds are normal.      Palpations: Abdomen is soft.   Musculoskeletal:         General: Normal range of motion.      Cervical back: Normal range of motion and neck supple.   Skin:     General: Skin is warm and dry.      Capillary Refill: Capillary refill takes less than 2 seconds.   Neurological:      General: No focal deficit present.      Mental Status: She is alert.   Psychiatric:         Mood and Affect: Mood normal.       Physical Exam               Results Reviewed:  Glucose   Date Value Ref Range Status   05/05/2025 85 65 - 99 mg/dL Final     BUN   Date Value Ref Range Status   05/05/2025 10 6 - 20 mg/dL Final     Creatinine   Date Value Ref Range Status   05/05/2025 0.72 0.57 - 1.00 mg/dL " Final     Sodium   Date Value Ref Range Status   05/05/2025 141 136 - 145 mmol/L Final     Potassium   Date Value Ref Range Status   05/05/2025 3.9 3.5 - 5.2 mmol/L Final     Chloride   Date Value Ref Range Status   05/05/2025 104 98 - 107 mmol/L Final     CO2   Date Value Ref Range Status   05/05/2025 26.0 22.0 - 29.0 mmol/L Final     Calcium   Date Value Ref Range Status   05/05/2025 9.2 8.6 - 10.5 mg/dL Final     ALT (SGPT)   Date Value Ref Range Status   05/05/2025 43 (H) 1 - 33 U/L Final     AST (SGOT)   Date Value Ref Range Status   05/05/2025 25 1 - 32 U/L Final     WBC   Date Value Ref Range Status   05/05/2025 6.26 3.40 - 10.80 10*3/mm3 Final     Hematocrit   Date Value Ref Range Status   05/05/2025 40.9 34.0 - 46.6 % Final     Platelets   Date Value Ref Range Status   05/05/2025 283 140 - 450 10*3/mm3 Final     Total Cholesterol   Date Value Ref Range Status   05/05/2025 132 0 - 200 mg/dL Final     Triglycerides   Date Value Ref Range Status   05/05/2025 107 0 - 150 mg/dL Final     HDL Cholesterol   Date Value Ref Range Status   05/05/2025 44 40 - 60 mg/dL Final     LDL Cholesterol    Date Value Ref Range Status   05/05/2025 68 0 - 100 mg/dL Final     LDL/HDL Ratio   Date Value Ref Range Status   05/05/2025 1.51  Final             Results  Labs   - Blood sugar: Normal   - Blood count: Normal   - Cholesterol: Normal   - Urinalysis: Normal   - Thyroid levels: Normal        Assessment / Plan     Assessment/Plan:   Diagnosis Plan   1. Class 1 obesity due to excess calories with serious comorbidity and body mass index (BMI) of 34.0 to 34.9 in adult  Tirzepatide-Weight Management (ZEPBOUND) 2.5 MG/0.5ML solution auto-injector      2. Morbid (severe) obesity due to excess calories  phentermine 37.5 MG capsule      3. Gastroesophageal reflux disease without esophagitis        4. Hyperthyroidism        5. Primary hypertension            Assessment & Plan  1. Weight management.  - Blood pressure readings have shown  improvement since the last visit.  - Prescription for Zepbound will be issued, with the understanding that it may not be covered by insurance.  - Advised to report any adverse effects experienced within 4 weeks of starting the medication. Dosage will be gradually increased from 2.5 mg to 15 mg. If the dosage exceeds 15 mg without any noticeable effect, the medication will be deemed ineffective.  - Instructions on how to administer the injection provided. Reintroduced to phentermine, which will be dispensed at pharmacy. The cost of the medication is approximately $10, and she will be allowed to try it for 2 months.      Return in about 2 months (around 7/19/2025). unless patient needs to be seen sooner or acute issues arise.      I have discussed the patient results/orders and and plan/recommendation with them at today's visit.      Signed by:    Dr. Jono Nino Date: 05/19/25    EMR Dictation/Transcription disclaimer:   Some of this note may be an electronic transcription/translation of spoken language to printed text. The electronic translation of spoken language may permit erroneous, or at times, nonsensical words or phrases to be inadvertently transcribed; Although I have reviewed the note for such errors, some may still exist.      Patient or patient representative verbalized consent for the use of Ambient Listening during the visit with  Jono Nino MD for chart documentation. 5/19/2025  10:10 CDT

## 2025-07-23 ENCOUNTER — TELEPHONE (OUTPATIENT)
Dept: INTERNAL MEDICINE | Facility: CLINIC | Age: 30
End: 2025-07-23